# Patient Record
Sex: FEMALE | Race: WHITE | NOT HISPANIC OR LATINO | Employment: FULL TIME | URBAN - METROPOLITAN AREA
[De-identification: names, ages, dates, MRNs, and addresses within clinical notes are randomized per-mention and may not be internally consistent; named-entity substitution may affect disease eponyms.]

---

## 2017-10-29 ENCOUNTER — COMMUNICATION - HEALTHEAST (OUTPATIENT)
Dept: SCHEDULING | Facility: CLINIC | Age: 27
End: 2017-10-29

## 2018-04-06 ENCOUNTER — OFFICE VISIT - HEALTHEAST (OUTPATIENT)
Dept: FAMILY MEDICINE | Facility: CLINIC | Age: 28
End: 2018-04-06

## 2018-04-06 DIAGNOSIS — Z00.00 ROUTINE GENERAL MEDICAL EXAMINATION AT A HEALTH CARE FACILITY: ICD-10-CM

## 2018-04-06 DIAGNOSIS — R00.2 PALPITATIONS: ICD-10-CM

## 2018-04-06 DIAGNOSIS — N64.4 BREAST PAIN, RIGHT: ICD-10-CM

## 2018-04-06 DIAGNOSIS — N63.0 BREAST LUMP: ICD-10-CM

## 2018-04-06 DIAGNOSIS — E66.9 OBESITY (BMI 35.0-39.9 WITHOUT COMORBIDITY): ICD-10-CM

## 2018-04-06 LAB
CHOLEST SERPL-MCNC: 180 MG/DL
ERYTHROCYTE [DISTWIDTH] IN BLOOD BY AUTOMATED COUNT: 11.4 % (ref 11–14.5)
FASTING STATUS PATIENT QL REPORTED: NO
FASTING STATUS PATIENT QL REPORTED: NORMAL
GLUCOSE BLD-MCNC: 97 MG/DL (ref 70–125)
HBA1C MFR BLD: 5.4 % (ref 3.5–6)
HCT VFR BLD AUTO: 39.6 % (ref 35–47)
HDLC SERPL-MCNC: 53 MG/DL
HGB BLD-MCNC: 13.5 G/DL (ref 12–16)
LDLC SERPL CALC-MCNC: 102 MG/DL
MCH RBC QN AUTO: 30.4 PG (ref 27–34)
MCHC RBC AUTO-ENTMCNC: 34.2 G/DL (ref 32–36)
MCV RBC AUTO: 89 FL (ref 80–100)
PLATELET # BLD AUTO: 288 THOU/UL (ref 140–440)
PMV BLD AUTO: 8.1 FL (ref 7–10)
RBC # BLD AUTO: 4.45 MILL/UL (ref 3.8–5.4)
TRIGL SERPL-MCNC: 126 MG/DL
TSH SERPL DL<=0.005 MIU/L-ACNC: 1.95 UIU/ML (ref 0.3–5)
WBC: 11 THOU/UL (ref 4–11)

## 2018-04-06 ASSESSMENT — MIFFLIN-ST. JEOR: SCORE: 1747.22

## 2018-04-09 ENCOUNTER — COMMUNICATION - HEALTHEAST (OUTPATIENT)
Dept: FAMILY MEDICINE | Facility: CLINIC | Age: 28
End: 2018-04-09

## 2018-04-17 ENCOUNTER — HOSPITAL ENCOUNTER (OUTPATIENT)
Dept: ULTRASOUND IMAGING | Facility: CLINIC | Age: 28
Discharge: HOME | End: 2018-04-17
Attending: FAMILY MEDICINE

## 2018-04-17 DIAGNOSIS — N64.4 BREAST PAIN, RIGHT: ICD-10-CM

## 2018-04-17 DIAGNOSIS — N63.0 BREAST LUMP: ICD-10-CM

## 2018-04-18 ENCOUNTER — COMMUNICATION - HEALTHEAST (OUTPATIENT)
Dept: SCHEDULING | Facility: CLINIC | Age: 28
End: 2018-04-18

## 2018-04-19 ENCOUNTER — COMMUNICATION - HEALTHEAST (OUTPATIENT)
Dept: FAMILY MEDICINE | Facility: CLINIC | Age: 28
End: 2018-04-19

## 2018-05-31 ENCOUNTER — COMMUNICATION - HEALTHEAST (OUTPATIENT)
Dept: SCHEDULING | Facility: CLINIC | Age: 28
End: 2018-05-31

## 2018-08-07 ENCOUNTER — RECORDS - HEALTHEAST (OUTPATIENT)
Dept: ADMINISTRATIVE | Facility: OTHER | Age: 28
End: 2018-08-07

## 2019-03-15 ENCOUNTER — COMMUNICATION - HEALTHEAST (OUTPATIENT)
Dept: FAMILY MEDICINE | Facility: CLINIC | Age: 29
End: 2019-03-15

## 2019-03-15 DIAGNOSIS — K21.9 GERD (GASTROESOPHAGEAL REFLUX DISEASE): ICD-10-CM

## 2019-04-04 ENCOUNTER — COMMUNICATION - HEALTHEAST (OUTPATIENT)
Dept: SCHEDULING | Facility: CLINIC | Age: 29
End: 2019-04-04

## 2019-04-04 DIAGNOSIS — K21.9 GASTROESOPHAGEAL REFLUX DISEASE WITHOUT ESOPHAGITIS: ICD-10-CM

## 2019-07-11 ENCOUNTER — RECORDS - HEALTHEAST (OUTPATIENT)
Dept: GENERAL RADIOLOGY | Facility: CLINIC | Age: 29
End: 2019-07-11

## 2019-07-11 ENCOUNTER — OFFICE VISIT - HEALTHEAST (OUTPATIENT)
Dept: FAMILY MEDICINE | Facility: CLINIC | Age: 29
End: 2019-07-11

## 2019-07-11 DIAGNOSIS — F32.0 MILD MAJOR DEPRESSION (H): ICD-10-CM

## 2019-07-11 DIAGNOSIS — R10.12 LEFT UPPER QUADRANT PAIN: ICD-10-CM

## 2019-07-11 DIAGNOSIS — E66.01 MORBID OBESITY (H): ICD-10-CM

## 2019-07-11 ASSESSMENT — MIFFLIN-ST. JEOR: SCORE: 1774.15

## 2019-07-12 ENCOUNTER — COMMUNICATION - HEALTHEAST (OUTPATIENT)
Dept: FAMILY MEDICINE | Facility: CLINIC | Age: 29
End: 2019-07-12

## 2019-07-14 ENCOUNTER — COMMUNICATION - HEALTHEAST (OUTPATIENT)
Dept: FAMILY MEDICINE | Facility: CLINIC | Age: 29
End: 2019-07-14

## 2019-09-01 ENCOUNTER — COMMUNICATION - HEALTHEAST (OUTPATIENT)
Dept: SCHEDULING | Facility: CLINIC | Age: 29
End: 2019-09-01

## 2019-09-27 ENCOUNTER — COMMUNICATION - HEALTHEAST (OUTPATIENT)
Dept: FAMILY MEDICINE | Facility: CLINIC | Age: 29
End: 2019-09-27

## 2019-10-30 ENCOUNTER — OFFICE VISIT - HEALTHEAST (OUTPATIENT)
Dept: FAMILY MEDICINE | Facility: CLINIC | Age: 29
End: 2019-10-30

## 2019-10-30 ENCOUNTER — COMMUNICATION - HEALTHEAST (OUTPATIENT)
Dept: TELEHEALTH | Facility: CLINIC | Age: 29
End: 2019-10-30

## 2019-10-30 DIAGNOSIS — Z23 NEED FOR INFLUENZA VACCINATION: ICD-10-CM

## 2019-10-30 DIAGNOSIS — F32.0 MILD MAJOR DEPRESSION (H): ICD-10-CM

## 2019-10-30 DIAGNOSIS — F41.9 ANXIETY: ICD-10-CM

## 2019-10-30 ASSESSMENT — ANXIETY QUESTIONNAIRES
5. BEING SO RESTLESS THAT IT IS HARD TO SIT STILL: SEVERAL DAYS
2. NOT BEING ABLE TO STOP OR CONTROL WORRYING: NOT AT ALL
GAD7 TOTAL SCORE: 5
IF YOU CHECKED OFF ANY PROBLEMS ON THIS QUESTIONNAIRE, HOW DIFFICULT HAVE THESE PROBLEMS MADE IT FOR YOU TO DO YOUR WORK, TAKE CARE OF THINGS AT HOME, OR GET ALONG WITH OTHER PEOPLE: SOMEWHAT DIFFICULT
3. WORRYING TOO MUCH ABOUT DIFFERENT THINGS: NOT AT ALL
6. BECOMING EASILY ANNOYED OR IRRITABLE: MORE THAN HALF THE DAYS
7. FEELING AFRAID AS IF SOMETHING AWFUL MIGHT HAPPEN: SEVERAL DAYS
4. TROUBLE RELAXING: SEVERAL DAYS
1. FEELING NERVOUS, ANXIOUS, OR ON EDGE: NOT AT ALL

## 2019-10-30 ASSESSMENT — PATIENT HEALTH QUESTIONNAIRE - PHQ9: SUM OF ALL RESPONSES TO PHQ QUESTIONS 1-9: 10

## 2019-11-13 ENCOUNTER — OFFICE VISIT - HEALTHEAST (OUTPATIENT)
Dept: FAMILY MEDICINE | Facility: CLINIC | Age: 29
End: 2019-11-13

## 2019-11-13 DIAGNOSIS — E66.01 CLASS 3 SEVERE OBESITY DUE TO EXCESS CALORIES WITH SERIOUS COMORBIDITY AND BODY MASS INDEX (BMI) OF 40.0 TO 44.9 IN ADULT (H): ICD-10-CM

## 2019-11-13 DIAGNOSIS — L30.9 ECZEMA, UNSPECIFIED TYPE: ICD-10-CM

## 2019-11-13 DIAGNOSIS — Z11.4 SCREENING FOR HUMAN IMMUNODEFICIENCY VIRUS: ICD-10-CM

## 2019-11-13 DIAGNOSIS — E66.813 CLASS 3 SEVERE OBESITY DUE TO EXCESS CALORIES WITH SERIOUS COMORBIDITY AND BODY MASS INDEX (BMI) OF 40.0 TO 44.9 IN ADULT (H): ICD-10-CM

## 2019-11-13 DIAGNOSIS — Z00.00 PHYSICAL EXAM: ICD-10-CM

## 2019-11-13 DIAGNOSIS — L82.1 SEBORRHEIC KERATOSIS: ICD-10-CM

## 2019-11-13 DIAGNOSIS — L21.9 SEBORRHEIC DERMATITIS OF SCALP: ICD-10-CM

## 2019-11-13 DIAGNOSIS — R06.2 WHEEZING: ICD-10-CM

## 2019-11-13 DIAGNOSIS — F32.0 MILD MAJOR DEPRESSION (H): ICD-10-CM

## 2019-11-13 LAB
CHOLEST SERPL-MCNC: 194 MG/DL
FASTING STATUS PATIENT QL REPORTED: NO
FASTING STATUS PATIENT QL REPORTED: YES
GLUCOSE BLD-MCNC: 75 MG/DL (ref 70–125)
HBA1C MFR BLD: 5.4 % (ref 3.5–6)
HDLC SERPL-MCNC: 53 MG/DL
HGB BLD-MCNC: 13.8 G/DL (ref 12–16)
HIV 1+2 AB+HIV1 P24 AG SERPL QL IA: NEGATIVE
LDLC SERPL CALC-MCNC: 120 MG/DL
TRIGL SERPL-MCNC: 106 MG/DL

## 2019-11-13 ASSESSMENT — MIFFLIN-ST. JEOR: SCORE: 1761.39

## 2019-11-14 ENCOUNTER — COMMUNICATION - HEALTHEAST (OUTPATIENT)
Dept: FAMILY MEDICINE | Facility: CLINIC | Age: 29
End: 2019-11-14

## 2019-11-15 ENCOUNTER — COMMUNICATION - HEALTHEAST (OUTPATIENT)
Dept: FAMILY MEDICINE | Facility: CLINIC | Age: 29
End: 2019-11-15

## 2019-11-19 ASSESSMENT — PATIENT HEALTH QUESTIONNAIRE - PHQ9: SUM OF ALL RESPONSES TO PHQ QUESTIONS 1-9: 8

## 2019-11-19 ASSESSMENT — ANXIETY QUESTIONNAIRES
GAD7 TOTAL SCORE: 4
7. FEELING AFRAID AS IF SOMETHING AWFUL MIGHT HAPPEN: SEVERAL DAYS
5. BEING SO RESTLESS THAT IT IS HARD TO SIT STILL: NOT AT ALL
2. NOT BEING ABLE TO STOP OR CONTROL WORRYING: NOT AT ALL
6. BECOMING EASILY ANNOYED OR IRRITABLE: SEVERAL DAYS
4. TROUBLE RELAXING: SEVERAL DAYS
1. FEELING NERVOUS, ANXIOUS, OR ON EDGE: SEVERAL DAYS
3. WORRYING TOO MUCH ABOUT DIFFERENT THINGS: NOT AT ALL

## 2019-11-27 ENCOUNTER — COMMUNICATION - HEALTHEAST (OUTPATIENT)
Dept: FAMILY MEDICINE | Facility: CLINIC | Age: 29
End: 2019-11-27

## 2020-01-26 ENCOUNTER — COMMUNICATION - HEALTHEAST (OUTPATIENT)
Dept: SCHEDULING | Facility: CLINIC | Age: 30
End: 2020-01-26

## 2020-06-30 ENCOUNTER — COMMUNICATION - HEALTHEAST (OUTPATIENT)
Dept: SCHEDULING | Facility: CLINIC | Age: 30
End: 2020-06-30

## 2020-07-01 ENCOUNTER — OFFICE VISIT - HEALTHEAST (OUTPATIENT)
Dept: FAMILY MEDICINE | Facility: CLINIC | Age: 30
End: 2020-07-01

## 2020-07-01 DIAGNOSIS — F07.81 POST CONCUSSION SYNDROME: ICD-10-CM

## 2020-07-01 DIAGNOSIS — S13.4XXA WHIPLASH INJURY TO NECK, INITIAL ENCOUNTER: ICD-10-CM

## 2020-07-05 ENCOUNTER — COMMUNICATION - HEALTHEAST (OUTPATIENT)
Dept: FAMILY MEDICINE | Facility: CLINIC | Age: 30
End: 2020-07-05

## 2020-07-06 ENCOUNTER — OFFICE VISIT - HEALTHEAST (OUTPATIENT)
Dept: FAMILY MEDICINE | Facility: CLINIC | Age: 30
End: 2020-07-06

## 2020-07-06 ENCOUNTER — COMMUNICATION - HEALTHEAST (OUTPATIENT)
Dept: SCHEDULING | Facility: CLINIC | Age: 30
End: 2020-07-06

## 2020-07-06 DIAGNOSIS — S06.0X0D CONCUSSION WITHOUT LOSS OF CONSCIOUSNESS, SUBSEQUENT ENCOUNTER: ICD-10-CM

## 2020-07-08 ENCOUNTER — COMMUNICATION - HEALTHEAST (OUTPATIENT)
Dept: FAMILY MEDICINE | Facility: CLINIC | Age: 30
End: 2020-07-08

## 2020-07-09 ENCOUNTER — COMMUNICATION - HEALTHEAST (OUTPATIENT)
Dept: FAMILY MEDICINE | Facility: CLINIC | Age: 30
End: 2020-07-09

## 2020-07-10 ENCOUNTER — OFFICE VISIT - HEALTHEAST (OUTPATIENT)
Dept: FAMILY MEDICINE | Facility: CLINIC | Age: 30
End: 2020-07-10

## 2020-07-10 DIAGNOSIS — F07.81 POST CONCUSSION SYNDROME: ICD-10-CM

## 2020-07-22 ENCOUNTER — HOSPITAL ENCOUNTER (OUTPATIENT)
Dept: NEUROLOGY | Facility: CLINIC | Age: 30
Setting detail: THERAPIES SERIES
Discharge: STILL ADMITTED OR ADMINISTRATIVE | End: 2020-07-22
Attending: NURSE PRACTITIONER

## 2020-07-22 DIAGNOSIS — G44.309 POST-CONCUSSION HEADACHE: ICD-10-CM

## 2020-07-22 DIAGNOSIS — H05.313: ICD-10-CM

## 2020-07-22 DIAGNOSIS — F07.81 POST CONCUSSION SYNDROME: ICD-10-CM

## 2020-07-22 DIAGNOSIS — S06.0X0D CONCUSSION WITHOUT LOSS OF CONSCIOUSNESS, SUBSEQUENT ENCOUNTER: ICD-10-CM

## 2020-07-22 DIAGNOSIS — H83.3X3 SOUND SENSITIVITY IN BOTH EARS: ICD-10-CM

## 2020-07-22 DIAGNOSIS — F32.0 MILD MAJOR DEPRESSION (H): ICD-10-CM

## 2020-07-22 DIAGNOSIS — R68.89 SENSITIVITY TO LIGHT: ICD-10-CM

## 2020-07-22 DIAGNOSIS — F06.4 ANXIETY DISORDER DUE TO MEDICAL CONDITION: ICD-10-CM

## 2020-07-22 DIAGNOSIS — R41.840 ATTENTION AND CONCENTRATION DEFICIT: ICD-10-CM

## 2020-07-22 DIAGNOSIS — Z76.89 RETURN TO WORK EVALUATION: ICD-10-CM

## 2020-07-22 DIAGNOSIS — R41.3 MEMORY DIFFICULTIES: ICD-10-CM

## 2020-07-22 DIAGNOSIS — R45.4 IRRITABILITY: ICD-10-CM

## 2020-07-22 DIAGNOSIS — G47.00 INSOMNIA, UNSPECIFIED TYPE: ICD-10-CM

## 2020-07-22 DIAGNOSIS — R53.83 FATIGUE, UNSPECIFIED TYPE: ICD-10-CM

## 2020-07-23 ENCOUNTER — COMMUNICATION - HEALTHEAST (OUTPATIENT)
Dept: NEUROLOGY | Facility: CLINIC | Age: 30
End: 2020-07-23

## 2020-07-28 ENCOUNTER — COMMUNICATION - HEALTHEAST (OUTPATIENT)
Dept: NEUROLOGY | Facility: CLINIC | Age: 30
End: 2020-07-28

## 2020-07-29 ENCOUNTER — COMMUNICATION - HEALTHEAST (OUTPATIENT)
Dept: NEUROLOGY | Facility: CLINIC | Age: 30
End: 2020-07-29

## 2020-07-31 ENCOUNTER — COMMUNICATION - HEALTHEAST (OUTPATIENT)
Dept: NEUROLOGY | Facility: CLINIC | Age: 30
End: 2020-07-31

## 2020-07-31 ENCOUNTER — COMMUNICATION - HEALTHEAST (OUTPATIENT)
Dept: FAMILY MEDICINE | Facility: CLINIC | Age: 30
End: 2020-07-31

## 2020-08-07 ENCOUNTER — OFFICE VISIT - HEALTHEAST (OUTPATIENT)
Dept: PHYSICAL THERAPY | Facility: REHABILITATION | Age: 30
End: 2020-08-07

## 2020-08-07 ENCOUNTER — COMMUNICATION - HEALTHEAST (OUTPATIENT)
Dept: NEUROLOGY | Facility: CLINIC | Age: 30
End: 2020-08-07

## 2020-08-07 DIAGNOSIS — G44.309 POST-CONCUSSION HEADACHE: ICD-10-CM

## 2020-08-07 DIAGNOSIS — F07.81 POST CONCUSSION SYNDROME: ICD-10-CM

## 2020-08-11 ENCOUNTER — OFFICE VISIT - HEALTHEAST (OUTPATIENT)
Dept: PHYSICAL THERAPY | Facility: REHABILITATION | Age: 30
End: 2020-08-11

## 2020-08-11 DIAGNOSIS — F07.81 POST CONCUSSION SYNDROME: ICD-10-CM

## 2020-08-11 DIAGNOSIS — G44.309 POST-CONCUSSION HEADACHE: ICD-10-CM

## 2020-08-13 ENCOUNTER — OFFICE VISIT - HEALTHEAST (OUTPATIENT)
Dept: PHYSICAL THERAPY | Facility: REHABILITATION | Age: 30
End: 2020-08-13

## 2020-08-13 DIAGNOSIS — F07.81 POST CONCUSSION SYNDROME: ICD-10-CM

## 2020-08-13 DIAGNOSIS — G44.309 POST-CONCUSSION HEADACHE: ICD-10-CM

## 2020-08-20 ENCOUNTER — OFFICE VISIT - HEALTHEAST (OUTPATIENT)
Dept: PHYSICAL THERAPY | Facility: REHABILITATION | Age: 30
End: 2020-08-20

## 2020-08-20 DIAGNOSIS — F07.81 POST CONCUSSION SYNDROME: ICD-10-CM

## 2020-08-20 DIAGNOSIS — G44.309 POST-CONCUSSION HEADACHE: ICD-10-CM

## 2020-08-25 ENCOUNTER — OFFICE VISIT - HEALTHEAST (OUTPATIENT)
Dept: PHYSICAL THERAPY | Facility: REHABILITATION | Age: 30
End: 2020-08-25

## 2020-08-25 DIAGNOSIS — F07.81 POST CONCUSSION SYNDROME: ICD-10-CM

## 2020-08-25 DIAGNOSIS — G44.309 POST-CONCUSSION HEADACHE: ICD-10-CM

## 2020-08-27 ENCOUNTER — OFFICE VISIT - HEALTHEAST (OUTPATIENT)
Dept: PHYSICAL THERAPY | Facility: REHABILITATION | Age: 30
End: 2020-08-27

## 2020-08-27 DIAGNOSIS — G44.309 POST-CONCUSSION HEADACHE: ICD-10-CM

## 2020-08-27 DIAGNOSIS — F07.81 POST CONCUSSION SYNDROME: ICD-10-CM

## 2020-09-01 ENCOUNTER — OFFICE VISIT - HEALTHEAST (OUTPATIENT)
Dept: PHYSICAL THERAPY | Facility: REHABILITATION | Age: 30
End: 2020-09-01

## 2020-09-01 DIAGNOSIS — F07.81 POST CONCUSSION SYNDROME: ICD-10-CM

## 2020-09-01 DIAGNOSIS — G44.309 POST-CONCUSSION HEADACHE: ICD-10-CM

## 2020-09-04 ENCOUNTER — OFFICE VISIT - HEALTHEAST (OUTPATIENT)
Dept: PHYSICAL THERAPY | Facility: REHABILITATION | Age: 30
End: 2020-09-04

## 2020-09-04 ENCOUNTER — HOSPITAL ENCOUNTER (OUTPATIENT)
Dept: NEUROLOGY | Facility: CLINIC | Age: 30
Setting detail: THERAPIES SERIES
Discharge: STILL ADMITTED OR ADMINISTRATIVE | End: 2020-09-04
Attending: NURSE PRACTITIONER

## 2020-09-04 DIAGNOSIS — G44.309 POST-CONCUSSION HEADACHE: ICD-10-CM

## 2020-09-04 DIAGNOSIS — R45.4 IRRITABILITY: ICD-10-CM

## 2020-09-04 DIAGNOSIS — F06.4 ANXIETY DISORDER DUE TO MEDICAL CONDITION: ICD-10-CM

## 2020-09-04 DIAGNOSIS — F07.81 POST CONCUSSION SYNDROME: ICD-10-CM

## 2020-09-04 DIAGNOSIS — R41.3 MEMORY DIFFICULTIES: ICD-10-CM

## 2020-09-04 DIAGNOSIS — R53.83 FATIGUE, UNSPECIFIED TYPE: ICD-10-CM

## 2020-09-04 DIAGNOSIS — G47.00 INSOMNIA, UNSPECIFIED TYPE: ICD-10-CM

## 2020-09-04 DIAGNOSIS — R42 DIZZINESS: ICD-10-CM

## 2020-09-04 DIAGNOSIS — H83.3X3 SOUND SENSITIVITY IN BOTH EARS: ICD-10-CM

## 2020-09-04 DIAGNOSIS — Z76.89 RETURN TO WORK EVALUATION: ICD-10-CM

## 2020-09-04 DIAGNOSIS — R68.89 SENSITIVITY TO LIGHT: ICD-10-CM

## 2020-09-04 DIAGNOSIS — R41.840 ATTENTION AND CONCENTRATION DEFICIT: ICD-10-CM

## 2020-09-04 DIAGNOSIS — R11.0 NAUSEA: ICD-10-CM

## 2020-09-04 DIAGNOSIS — F32.0 MILD MAJOR DEPRESSION (H): ICD-10-CM

## 2020-09-10 ENCOUNTER — OFFICE VISIT - HEALTHEAST (OUTPATIENT)
Dept: PHYSICAL THERAPY | Facility: REHABILITATION | Age: 30
End: 2020-09-10

## 2020-09-10 DIAGNOSIS — F07.81 POST CONCUSSION SYNDROME: ICD-10-CM

## 2020-09-10 DIAGNOSIS — G44.309 POST-CONCUSSION HEADACHE: ICD-10-CM

## 2020-09-14 ENCOUNTER — OFFICE VISIT - HEALTHEAST (OUTPATIENT)
Dept: PHYSICAL THERAPY | Facility: REHABILITATION | Age: 30
End: 2020-09-14

## 2020-09-14 DIAGNOSIS — F07.81 POST CONCUSSION SYNDROME: ICD-10-CM

## 2020-09-14 DIAGNOSIS — G44.309 POST-CONCUSSION HEADACHE: ICD-10-CM

## 2020-09-17 ENCOUNTER — OFFICE VISIT - HEALTHEAST (OUTPATIENT)
Dept: PHYSICAL THERAPY | Facility: REHABILITATION | Age: 30
End: 2020-09-17

## 2020-09-17 DIAGNOSIS — G44.309 POST-CONCUSSION HEADACHE: ICD-10-CM

## 2020-09-17 DIAGNOSIS — F07.81 POST CONCUSSION SYNDROME: ICD-10-CM

## 2020-09-21 ENCOUNTER — OFFICE VISIT - HEALTHEAST (OUTPATIENT)
Dept: PHYSICAL THERAPY | Facility: REHABILITATION | Age: 30
End: 2020-09-21

## 2020-09-21 DIAGNOSIS — G44.309 POST-CONCUSSION HEADACHE: ICD-10-CM

## 2020-09-21 DIAGNOSIS — F07.81 POST CONCUSSION SYNDROME: ICD-10-CM

## 2020-10-06 ENCOUNTER — OFFICE VISIT - HEALTHEAST (OUTPATIENT)
Dept: PHYSICAL THERAPY | Facility: REHABILITATION | Age: 30
End: 2020-10-06

## 2020-10-06 DIAGNOSIS — F07.81 POST CONCUSSION SYNDROME: ICD-10-CM

## 2020-10-06 DIAGNOSIS — G44.309 POST-CONCUSSION HEADACHE: ICD-10-CM

## 2020-10-12 ENCOUNTER — OFFICE VISIT - HEALTHEAST (OUTPATIENT)
Dept: PHYSICAL THERAPY | Facility: REHABILITATION | Age: 30
End: 2020-10-12

## 2020-10-12 DIAGNOSIS — F07.81 POST CONCUSSION SYNDROME: ICD-10-CM

## 2020-10-12 DIAGNOSIS — G44.309 POST-CONCUSSION HEADACHE: ICD-10-CM

## 2020-10-13 ENCOUNTER — HOSPITAL ENCOUNTER (OUTPATIENT)
Dept: NEUROLOGY | Facility: CLINIC | Age: 30
Setting detail: THERAPIES SERIES
Discharge: STILL ADMITTED OR ADMINISTRATIVE | End: 2020-10-13
Attending: NURSE PRACTITIONER

## 2020-10-13 DIAGNOSIS — R68.89 SENSITIVITY TO LIGHT: ICD-10-CM

## 2020-10-13 DIAGNOSIS — G44.309 POST-CONCUSSION HEADACHE: ICD-10-CM

## 2020-10-13 DIAGNOSIS — H83.3X3 SOUND SENSITIVITY IN BOTH EARS: ICD-10-CM

## 2020-10-13 DIAGNOSIS — F32.0 MILD MAJOR DEPRESSION (H): ICD-10-CM

## 2020-10-13 DIAGNOSIS — R41.840 ATTENTION AND CONCENTRATION DEFICIT: ICD-10-CM

## 2020-10-13 DIAGNOSIS — F07.81 POSTCONCUSSION SYNDROME: ICD-10-CM

## 2020-10-13 DIAGNOSIS — R41.3 MEMORY DIFFICULTIES: ICD-10-CM

## 2020-10-13 DIAGNOSIS — Z76.89 RETURN TO WORK EVALUATION: ICD-10-CM

## 2020-10-13 DIAGNOSIS — F06.4 ANXIETY DISORDER DUE TO MEDICAL CONDITION: ICD-10-CM

## 2020-10-13 ASSESSMENT — MIFFLIN-ST. JEOR: SCORE: 1780.95

## 2020-10-19 ENCOUNTER — COMMUNICATION - HEALTHEAST (OUTPATIENT)
Dept: PHYSICAL THERAPY | Facility: REHABILITATION | Age: 30
End: 2020-10-19

## 2020-11-16 ENCOUNTER — AMBULATORY - HEALTHEAST (OUTPATIENT)
Dept: FAMILY MEDICINE | Facility: CLINIC | Age: 30
End: 2020-11-16

## 2020-11-16 ENCOUNTER — VIRTUAL VISIT (OUTPATIENT)
Dept: FAMILY MEDICINE | Facility: OTHER | Age: 30
End: 2020-11-16
Payer: COMMERCIAL

## 2020-11-16 DIAGNOSIS — Z20.822 SUSPECTED COVID-19 VIRUS INFECTION: ICD-10-CM

## 2020-11-16 PROCEDURE — 99421 OL DIG E/M SVC 5-10 MIN: CPT | Performed by: PHYSICIAN ASSISTANT

## 2020-11-16 NOTE — PROGRESS NOTES
"Date: 2020 09:50:54  Clinician: Davian Zavaleta  Clinician NPI: [id number]  Patient: Radha Dickerson  Patient : 1990  Patient Address: [address] [place] [address]  Patient Phone: [phone]  Visit Protocol: URI  Patient Summary:  Radha is a 30 year old ( : 1990 ) female who initiated a [facility] Visit for COVID-19 (Coronavirus) evaluation and screening. When asked the question \"Please sign me up to receive news, health information and promotions from [facility].\", Radha responded \"Yes\".    Radha states her symptoms started 1-2 days ago.   Her symptoms consist of facial pain or pressure, malaise, a sore throat, tooth pain, and a cough.   Symptom details     Cough: Radha coughs a few times an hour and her cough is not more bothersome at night. Phlegm does not come into her throat when she coughs. She does not believe her cough is caused by post-nasal drip.     Sore throat: Radha reports having mild throat pain (1-3 on a 10 point pain scale), does not have exudate on her tonsils, and can swallow liquids. She is not sure if the lymph nodes in her neck are enlarged. A rash has not appeared on the skin since the sore throat started.     Facial pain or pressure: The facial pain or pressure does not feel worse when bending or leaning forward.     Tooth pain: The tooth pain is not caused by a cavity, recent dental work, or other mouth problems.      Radha denies having vomiting, rhinitis, myalgias, chills, ageusia, diarrhea, ear pain, headache, wheezing, fever, nasal congestion, nausea, and anosmia. She also denies taking antibiotic medication in the past month and having recent facial or sinus surgery in the past 60 days. She is not experiencing dyspnea.   Precipitating events  Within the past week, Radha has not been exposed to someone with strep throat. She has not recently been exposed to someone with influenza. Radha has been in close contact with the following high risk individuals: " immunocompromised people and children under the age of 5.   Pertinent COVID-19 (Coronavirus) information  Radha does not work or volunteer as healthcare worker or a . In the past 14 days, Radha has worked or volunteered at a healthcare facility or a group living setting. 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   In the past 14 days, she has not lived in a congregate living setting.   Radha has had a close contact with a laboratory-confirmed COVID-19 patient within 14 days of symptom onset. She was not exposed at her work. Date Radha was exposed to the laboratory-confirmed COVID-19 patient: 11/05/2020   Additional information about contact with COVID-19 (Coronavirus) patient as reported by the patient (free text): Exposed to a 16 year old in [place] who became symptomatic on 11/6/2020    Since December 2019, Radha has not been tested for COVID-19 and has not had upper respiratory infection or influenza-like illness.   Pertinent medical history  Radha does not get yeast infections when she takes antibiotics.   Radha needs a return to work/school note.   Weight: 235 lbs   Radha does not smoke or use smokeless tobacco.   She denies pregnancy and denies breastfeeding. She has menstruated in the past month.   Weight: 235 lbs    MEDICATIONS: escitalopram oxalate oral, ALLERGIES: morphine  Clinician Response:  Dear Radha,   Your symptoms show that you may have coronavirus (COVID-19). This illness can cause fever, cough and trouble breathing. Many people get a mild case and get better on their own. Some people can get very sick.  What should I do?  We would like to test you for this virus.   1. Please call [phone] to schedule your visit. Explain that you were referred by [facility] to have a COVID-19 test. Be ready to share your [facility] visit ID number.  * If you need to schedule in [facility] please call  "[phone] or for [facility] please call [phone].  * If you need to schedule in the [place] area please call [phone]. [facility] employees call [phone].  The following will serve as your written order for this COVID Test, ordered by me, for the indication of suspected COVID [Z20.828]: The test will be ordered in [facility], our electronic health record, after you are scheduled. It will show as ordered and authorized by Terry Moreno MD.  Order: COVID-19 (Coronavirus) PCR for SYMPTOMATIC testing from [facility].   2. When it's time for your COVID test:  Stay at least 6 feet away from others. (If someone will drive you to your test, stay in the backseat, as far away from the  as you can.)   Cover your mouth and nose with a mask, tissue or washcloth.  Go straight to the testing site. Don't make any stops on the way there or back.      3.Starting now: Stay home and away from others (self-isolate) until:   You've had no fever---and no medicine that reduces fever---for one full day (24 hours). And...   Your other symptoms have gotten better. For example, your cough or breathing has improved. And...   At least 10 days have passed since your symptoms started.       During this time, don't leave the house except for testing or medical care.   Stay in your own room, even for meals. Use your own bathroom if you can.   Stay away from others in your home. No hugging, kissing or shaking hands. No visitors.  Don't go to work, school or anywhere else.    Clean \"high touch\" surfaces often (doorknobs, counters, handles, etc.). Use a household cleaning spray or wipes. You'll find a full list of  on the EPA website: www.epa.gov/pesticide-registration/list-n-disinfectants-use-against-sars-cov-2.   Cover your mouth and nose with a mask, tissue or washcloth to avoid spreading germs.  Wash your hands and face often. Use soap and water.  Caregivers in these groups are at risk for severe illness due to COVID-19:  o " People 65 years and older  o People who live in a nursing home or long-term care facility  o People with chronic disease (lung, heart, cancer, diabetes, kidney, liver, immunologic)  o People who have a weakened immune system, including those who:   Are in cancer treatment  Take medicine that weakens the immune system, such as corticosteroids  Had a bone marrow or organ transplant  Have an immune deficiency  Have poorly controlled HIV or AIDS  Are obese (body mass index of 40 or higher)  Smoke regularly   o Caregivers should wear gloves while washing dishes, handling laundry and cleaning bedrooms and bathrooms.  o Use caution when washing and drying laundry: Don't shake dirty laundry, and use the warmest water setting that you can.  o For more tips, go to www.cdc.gov/coronavirus/2019-ncov/downloads/10Things.pdf.       How can I take care of myself?   Get lots of rest. Drink extra fluids (unless a doctor has told you not to).   Take Tylenol (acetaminophen) for fever or pain. If you have liver or kidney problems, ask your family doctor if it's okay to take Tylenol.   Adults can take either:    650 mg (two 325 mg pills) every 4 to 6 hours, or...   1,000 mg (two 500 mg pills) every 8 hours as needed.    Note: Don't take more than 3,000 mg in one day. Acetaminophen is found in many medicines (both prescribed and over-the-counter medicines). Read all labels to be sure you don't take too much.   For children, check the Tylenol bottle for the right dose. The dose is based on the child's age or weight.    If you have other health problems (like cancer, heart failure, an organ transplant or severe kidney disease): Call your specialty clinic if you don't feel better in the next 2 days.       Know when to call 911. Emergency warning signs include:    Trouble breathing or shortness of breath Pain or pressure in the chest that doesn't go away Feeling confused like you haven't felt before, or not being able to wake up  Bluish-colored lips or face.  Where can I get more information?   [facility] -- About COVID-19: [email or web address]   CDC -- What to Do If You're Sick: www.cdc.gov/coronavirus/2019-ncov/about/steps-when-sick.html   [facility] -- Ending Home Isolation: www.cdc.gov/coronavirus/2019-ncov/hcp/disposition-in-home-patients.html   [facility] -- Caring for Someone: www.cdc.gov/coronavirus/2019-ncov/if-you-are-sick/care-for-someone.html   [facility] -- Interim Guidance for Hospital Discharge to Home: www.[facility].mn.us/diseases/coronavirus/hcp/hospdischarge.pdf   [facility] clinical trials (COVID-19 research studies): clinicalaffairs.[facility]-clinical-trials    Below are the COVID-19 hotlines at the [place] Department of [facility] ([facility]). Interpreters are available.    For health questions: Call 244-752-5757 or 1-559.177.5109 (7 a.m. to 7 p.m.) For questions about schools and childcare: Call 639-988-8294 or 1-293.715.3792 (7 a.m. to 7 p.m.)    Diagnosis: Contact with and (suspected) exposure to other viral communicable diseases  Diagnosis ICD: Z20.828

## 2020-11-18 ENCOUNTER — COMMUNICATION - HEALTHEAST (OUTPATIENT)
Dept: SCHEDULING | Facility: CLINIC | Age: 30
End: 2020-11-18

## 2020-11-19 ENCOUNTER — HOSPITAL ENCOUNTER (OUTPATIENT)
Dept: NEUROLOGY | Facility: CLINIC | Age: 30
Setting detail: THERAPIES SERIES
Discharge: STILL ADMITTED OR ADMINISTRATIVE | End: 2020-11-19
Attending: NURSE PRACTITIONER

## 2020-11-19 DIAGNOSIS — G44.309 POST-CONCUSSION HEADACHE: ICD-10-CM

## 2020-11-19 DIAGNOSIS — R68.89 SENSITIVITY TO LIGHT: ICD-10-CM

## 2020-11-19 DIAGNOSIS — G47.00 INSOMNIA, UNSPECIFIED TYPE: ICD-10-CM

## 2020-11-19 DIAGNOSIS — H83.3X3 SOUND SENSITIVITY IN BOTH EARS: ICD-10-CM

## 2020-11-19 DIAGNOSIS — Z76.89 RETURN TO WORK EVALUATION: ICD-10-CM

## 2020-11-19 DIAGNOSIS — R42 DIZZINESS: ICD-10-CM

## 2020-11-19 DIAGNOSIS — F07.81 POSTCONCUSSION SYNDROME: ICD-10-CM

## 2020-11-19 DIAGNOSIS — F32.0 MILD MAJOR DEPRESSION (H): ICD-10-CM

## 2020-11-19 DIAGNOSIS — R11.0 NAUSEA: ICD-10-CM

## 2020-11-19 DIAGNOSIS — R53.83 FATIGUE, UNSPECIFIED TYPE: ICD-10-CM

## 2020-11-19 DIAGNOSIS — R41.840 ATTENTION AND CONCENTRATION DEFICIT: ICD-10-CM

## 2020-11-19 ASSESSMENT — MIFFLIN-ST. JEOR: SCORE: 1758.27

## 2020-12-02 ENCOUNTER — COMMUNICATION - HEALTHEAST (OUTPATIENT)
Dept: FAMILY MEDICINE | Facility: CLINIC | Age: 30
End: 2020-12-02

## 2020-12-02 DIAGNOSIS — L21.9 SEBORRHEIC DERMATITIS OF SCALP: ICD-10-CM

## 2020-12-02 DIAGNOSIS — F32.0 MILD MAJOR DEPRESSION (H): ICD-10-CM

## 2020-12-17 ENCOUNTER — COMMUNICATION - HEALTHEAST (OUTPATIENT)
Dept: FAMILY MEDICINE | Facility: CLINIC | Age: 30
End: 2020-12-17

## 2020-12-17 ENCOUNTER — HOSPITAL ENCOUNTER (OUTPATIENT)
Dept: NEUROLOGY | Facility: CLINIC | Age: 30
Setting detail: THERAPIES SERIES
Discharge: STILL ADMITTED OR ADMINISTRATIVE | End: 2020-12-17
Attending: NURSE PRACTITIONER

## 2020-12-17 DIAGNOSIS — F32.0 MILD MAJOR DEPRESSION (H): ICD-10-CM

## 2020-12-17 DIAGNOSIS — R42 DIZZINESS: ICD-10-CM

## 2020-12-17 DIAGNOSIS — Z76.89 RETURN TO WORK EVALUATION: ICD-10-CM

## 2020-12-17 DIAGNOSIS — R45.4 IRRITABILITY: ICD-10-CM

## 2020-12-17 DIAGNOSIS — F07.81 POSTCONCUSSION SYNDROME: ICD-10-CM

## 2020-12-17 DIAGNOSIS — R41.840 ATTENTION AND CONCENTRATION DEFICIT: ICD-10-CM

## 2020-12-17 DIAGNOSIS — R41.3 MEMORY DIFFICULTIES: ICD-10-CM

## 2020-12-17 DIAGNOSIS — R53.83 FATIGUE, UNSPECIFIED TYPE: ICD-10-CM

## 2020-12-17 DIAGNOSIS — F06.4 ANXIETY DISORDER DUE TO MEDICAL CONDITION: ICD-10-CM

## 2020-12-17 DIAGNOSIS — G47.00 INSOMNIA, UNSPECIFIED TYPE: ICD-10-CM

## 2020-12-17 DIAGNOSIS — H83.3X3 SOUND SENSITIVITY IN BOTH EARS: ICD-10-CM

## 2020-12-17 DIAGNOSIS — G44.309 POST-CONCUSSION HEADACHE: ICD-10-CM

## 2020-12-17 DIAGNOSIS — R68.89 SENSITIVITY TO LIGHT: ICD-10-CM

## 2020-12-17 DIAGNOSIS — R11.0 NAUSEA: ICD-10-CM

## 2021-02-26 ENCOUNTER — AMBULATORY - HEALTHEAST (OUTPATIENT)
Dept: NURSING | Facility: CLINIC | Age: 31
End: 2021-02-26

## 2021-03-17 ENCOUNTER — OFFICE VISIT - HEALTHEAST (OUTPATIENT)
Dept: FAMILY MEDICINE | Facility: CLINIC | Age: 31
End: 2021-03-17

## 2021-03-17 DIAGNOSIS — F32.0 MILD MAJOR DEPRESSION (H): ICD-10-CM

## 2021-03-17 ASSESSMENT — ANXIETY QUESTIONNAIRES
4. TROUBLE RELAXING: NEARLY EVERY DAY
6. BECOMING EASILY ANNOYED OR IRRITABLE: NEARLY EVERY DAY
GAD7 TOTAL SCORE: 9
7. FEELING AFRAID AS IF SOMETHING AWFUL MIGHT HAPPEN: NOT AT ALL
3. WORRYING TOO MUCH ABOUT DIFFERENT THINGS: NOT AT ALL
5. BEING SO RESTLESS THAT IT IS HARD TO SIT STILL: MORE THAN HALF THE DAYS
1. FEELING NERVOUS, ANXIOUS, OR ON EDGE: SEVERAL DAYS
IF YOU CHECKED OFF ANY PROBLEMS ON THIS QUESTIONNAIRE, HOW DIFFICULT HAVE THESE PROBLEMS MADE IT FOR YOU TO DO YOUR WORK, TAKE CARE OF THINGS AT HOME, OR GET ALONG WITH OTHER PEOPLE: NOT DIFFICULT AT ALL
2. NOT BEING ABLE TO STOP OR CONTROL WORRYING: NOT AT ALL

## 2021-03-17 ASSESSMENT — PATIENT HEALTH QUESTIONNAIRE - PHQ9: SUM OF ALL RESPONSES TO PHQ QUESTIONS 1-9: 10

## 2021-03-19 ENCOUNTER — AMBULATORY - HEALTHEAST (OUTPATIENT)
Dept: NURSING | Facility: CLINIC | Age: 31
End: 2021-03-19

## 2021-04-24 ENCOUNTER — COMMUNICATION - HEALTHEAST (OUTPATIENT)
Dept: FAMILY MEDICINE | Facility: CLINIC | Age: 31
End: 2021-04-24

## 2021-04-24 DIAGNOSIS — F32.0 MILD MAJOR DEPRESSION (H): ICD-10-CM

## 2021-04-24 DIAGNOSIS — L21.9 SEBORRHEIC DERMATITIS OF SCALP: ICD-10-CM

## 2021-05-17 ENCOUNTER — COMMUNICATION - HEALTHEAST (OUTPATIENT)
Dept: SCHEDULING | Facility: CLINIC | Age: 31
End: 2021-05-17

## 2021-05-23 ENCOUNTER — AMBULATORY - HEALTHEAST (OUTPATIENT)
Dept: NEUROLOGY | Facility: CLINIC | Age: 31
End: 2021-05-23

## 2021-05-23 ENCOUNTER — RECORDS - HEALTHEAST (OUTPATIENT)
Dept: FAMILY MEDICINE | Facility: CLINIC | Age: 31
End: 2021-05-23

## 2021-05-23 DIAGNOSIS — F32.0 MILD MAJOR DEPRESSION (H): ICD-10-CM

## 2021-05-23 DIAGNOSIS — R42 DIZZINESS: ICD-10-CM

## 2021-05-23 DIAGNOSIS — F07.81 POST CONCUSSION SYNDROME: ICD-10-CM

## 2021-05-26 ASSESSMENT — PATIENT HEALTH QUESTIONNAIRE - PHQ9
SUM OF ALL RESPONSES TO PHQ QUESTIONS 1-9: 8
SUM OF ALL RESPONSES TO PHQ QUESTIONS 1-9: 10

## 2021-05-27 ASSESSMENT — PATIENT HEALTH QUESTIONNAIRE - PHQ9: SUM OF ALL RESPONSES TO PHQ QUESTIONS 1-9: 10

## 2021-05-27 NOTE — TELEPHONE ENCOUNTER
Called and left a message re prescription. Advised to call back with prescription questions.     Leanne Guzman RN BA [facility] Triage/Med Refill 4/5/2019 10:10 AM

## 2021-05-27 NOTE — TELEPHONE ENCOUNTER
RN Triage:    New insurance does not cover pantoprazole 40 mg dose.  Cost is $360.00 without insurance.  Insurance does cover 20 mg pill.  Could you please resend prescription for 20 mg pills, and change the sig and the number of pills dispensed accordingly?  Otherwise, does Dr. Valenzuela have any other suggestions?  She is awaiting a callback on  4/5/19.    Ivory Houston RN   [facility]

## 2021-05-28 ASSESSMENT — ANXIETY QUESTIONNAIRES
GAD7 TOTAL SCORE: 9
GAD7 TOTAL SCORE: 5
GAD7 TOTAL SCORE: 4

## 2021-05-30 NOTE — TELEPHONE ENCOUNTER
----- Message from Cassy Valenzuela MD sent at 7/11/2019  4:43 PM CDT -----  Please let pt know that radiologist did not see any additional abnormalities on her abdominal xray

## 2021-05-30 NOTE — TELEPHONE ENCOUNTER
Left message to call back for: xray results  Information to relay to patient:  Ok to relay results below upon return call.

## 2021-05-30 NOTE — TELEPHONE ENCOUNTER
Patient Returning Call  Reason for call:  Test  results  Information relayed to patient:  Read to patient below message. Patient had no further questions.  Patient has additional questions:  No  If YES, what are yourquestions/concerns:  NA    Okay to leave a detailed message?: No call back needed

## 2021-05-30 NOTE — TELEPHONE ENCOUNTER
Pt has been having issues with constipation    Now since she has been taking the miralax her stools are loose.    Advised that she needs to increase her fiber intake as recommended by the provider.    If symptoms persist recommend that she be seen.    Neva Maurice RN  [facility] Medication Refill and Triage Nurse  7/14/2019  12:49 PM    Reason for Disposition    Treating constipation with Over-The-Counter (OTC) medicines, questions about    Protocols used: CONSTIPATION-A-AH

## 2021-05-30 NOTE — PROGRESS NOTES
Assessment/Plan:     1. Left upper quadrant pain  XR Abdomen 2 Views   2. Morbid obesity (H)     3. Mild major depression (H)         Diagnoses and all orders for this visit:    Left upper quadrant pain  -     XR Abdomen 2 Views; Future; Expected date: 07/11/2019  -Discussed with patient results of x-ray.  I feel that a lot of her symptoms are due to underlying constipation.  She may have some associated irritable bowel syndrome as well.  At this time, we will work to improve her bowel regimen.  Encourage adequate fiber and fluids.  Recommend MiraLAX daily to improve regularity of bowel movements.  Notify if this does not improve symptoms within the next week.  Do not feel any lab work is needed at this time.  No red flag symptoms on history or exam.  She is planning to schedule physical so we can follow-up on her symptoms at that time.  Also discussed keeping dietary log regarding foods and symptoms.  Discussed low FODMAPs diet and provided patient information.    Morbid obesity (H)  Encouraged regular exercise, healthy diet and weight loss efforts    Mild major depression (H)  She will continues to see therapist on a regular basis.           The following are part of a depression follow up plan for the patient:  under care of mental health team--sees a therapist regularly    Subjective:      Radha Dickerson is a 29 y.o. female who comes in today with concern about possible irritable bowel syndrome.  She reports that she has history of irregular bowel movements.  She has been bothered in particular by some pain in the left upper quadrant for the past couple of weeks.  This occurs after she eats.  She feels that there is something inflamed in this area.  Describes a squeezing discomfort.  Feels the need to go to the bathroom and have a bowel movement but then when she goes to the bathroom, nothing much comes out.  She has alternated constipation and diarrhea for the past couple of weeks.  She has not noticed  blood in her stools.  Sometimes she has had mucus in her stools.  She has has not experienced any urinary symptoms.  No fevers or chills.  No nausea or vomiting.  She does have history of acid reflux.  She generally is very careful about her diet.  She avoids use of alcohol.  She does not eat much dairy products.  She typically will eat meats, fruits and vegetables.  Does not eat a lot of wheat products.  She has been tested for celiac disease in the past.  There is family history of colon cancer in her grandmother on her mother side.  Has multiple relatives on father's side have had breast cancer.  She is not a smoker.  Medications and allergies reviewed and updated.  She has no other concerns or questions.  She does have history of depression.  She is not currently taking medication.  She does  see a therapist on a regular basis.    No current outpatient medications on file.     No current facility-administered medications for this visit.        Past Medical History, Family History, and Social History reviewed.  Past Medical History:   Diagnosis Date     Ectopic pregnancy      Past Surgical History:   Procedure Laterality Date     TONSILLECTOMY       Grapefruit and Morphine  Family History   Problem Relation Age of Onset     Cancer Mother         skin     Alcohol abuse Mother      Hyperlipidemia Father      Breast cancer Paternal Aunt      Breast cancer Paternal Grandmother      Colon cancer Maternal Grandmother      Stroke Maternal Grandmother      Breast cancer Paternal Aunt      Breast cancer Paternal Aunt      Breast cancer Paternal cousin      Breast cancer Paternal cousin      Social History     Socioeconomic History     Marital status: Single     Spouse name: Not on file     Number of children: Not on file     Years of education: Not on file     Highest education level: Not on file   Occupational History     Not on file   Social Needs     Financial resource strain: Not on file     Food insecurity:      "Worry: Not on file     Inability: Not on file     Transportation needs:     Medical: Not on file     Non-medical: Not on file   Tobacco Use     Smoking status: Never Smoker     Smokeless tobacco: Never Used   Substance and Sexual Activity     Alcohol use: No     Drug use: No     Sexual activity: Yes     Partners: Male     Birth control/protection: Condom   Lifestyle     Physical activity:     Days per week: Not on file     Minutes per session: Not on file     Stress: Not on file   Relationships     Social connections:     Talks on phone: Not on file     Gets together: Not on file     Attends Hoahaoism service: Not on file     Active member of club or organization: Not on file     Attends meetings of clubs or organizations: Not on file     Relationship status: Not on file     Intimate partner violence:     Fear of current or ex partner: Not on file     Emotionally abused: Not on file     Physically abused: Not on file     Forced sexual activity: Not on file   Other Topics Concern     Not on file   Social History Narrative     Not on file         Review of systems is as stated in HPI, and the remainder of the 10 system review is otherwise negative.    Objective:     Vitals:    07/11/19 1334   BP: 118/76   Patient Site: Right Arm   Patient Position: Sitting   Cuff Size: Adult Large   Pulse: 78   Temp: 98.6  F (37  C)   TempSrc: Oral   SpO2: 98%   Weight: (!) 233 lb 8 oz (105.9 kg)   Height: 5' 4\" (1.626 m)    Body mass index is 40.08 kg/m .    General appearance: alert, appears stated age and cooperative  Lungs: clear to auscultation bilaterally  Heart: regular rate and rhythm, S1, S2 normal, no murmur, click, rub or gallop  Abdomen: soft, non-tender; bowel sounds normal; no masses,  no organomegaly    Results: AMERICA-7: 10   PHQ-9: 7    Results: X-ray of abdomen was performed in clinic.  This was personally reviewed.  There was presence of large amount of stool within colon.    This note has been dictated using voice " recognition software. Any grammatical or context distortions are unintentional and inherent to the the software.

## 2021-05-31 NOTE — TELEPHONE ENCOUNTER
"DX with IBS when last seen by Dr Valenzuela 2 months ago.. She had been constipated. She feels constipated again She states the pain is in a very specific area--upper abdominal pain mainly on the left side. Only when standing straight up. When sitting or lying down she is OK. She states it feels like constipation. She had not had a BM for the last 2 days. She had 1 regular small BM today and little pieces after that.   She states that the pain takes away her breath. Not sharp. Dull. Currently pain=\"8\".  She feels she needs to bend over when she walks. She states that  Dr Valenzuela put her on Miralax. Patient uses the Miralax prn. Nauseated without vomiting. No fever noted. She is taking fiber, warm liquids and MIralax.     Reason for Disposition    [1] SEVERE pain (e.g., excruciating) AND [2] present > 1 hour    Protocols used: ABDOMINAL PAIN - UPPER-A-AH    Triaged to a disposition of Go to ED now. She is considering going to [facility], she is aware that if she needs admission, she would be transferred to ED.     Rosalia Victor RN [facility] Triage Nurse  "

## 2021-06-01 ENCOUNTER — OFFICE VISIT - HEALTHEAST (OUTPATIENT)
Dept: FAMILY MEDICINE | Facility: CLINIC | Age: 31
End: 2021-06-01

## 2021-06-01 VITALS — HEIGHT: 64 IN | WEIGHT: 227.56 LBS | BODY MASS INDEX: 38.85 KG/M2

## 2021-06-01 DIAGNOSIS — R06.02 SOB (SHORTNESS OF BREATH): ICD-10-CM

## 2021-06-02 ENCOUNTER — RECORDS - HEALTHEAST (OUTPATIENT)
Dept: FAMILY MEDICINE | Facility: CLINIC | Age: 31
End: 2021-06-02

## 2021-06-02 NOTE — PROGRESS NOTES
Assessment/Plan:     1. Mild major depression (H)  escitalopram oxalate (LEXAPRO) 10 MG tablet   2. Need for influenza vaccination  Influenza,Seasonal,Quad,INJ =/>6months   3. Anxiety         Diagnoses and all orders for this visit:    Mild major depression (H)  -     escitalopram oxalate (LEXAPRO) 10 MG tablet; Take 1/2 tablet by mouth daily for 1 week and then increase to 1 tablet by mouth daily  Dispense: 90 tablet; Refill: 3  -We discussed medications used for treatment of anxiety as well as depression.  She previously took Lexapro and found that beneficial but did have some side effects of headaches initially but these went away after a couple of weeks.  Discussed we could try alternative medication although there is potential risk for side effects with that as well.  Discussed that since Lexapro was helpful and the headaches went away that it would make sense to resume this medication.  Discussed will take about a month before it starts to take full effect.  She was prescribed prescription for 10 mg of Lexapro and we we will have her try taking a little bit lower dose of 5 mg daily for 1 week and then increase to a full tablet daily.  Discussed this may help reduce the risk of side effects with starting medication.  She will continue to follow with her therapist.  She does have an appointment for physical in November so we will follow-up with her at that time.    Need for influenza vaccination  -     Influenza,Seasonal,Quad,INJ =/>6months    Anxiety  See #1 above         The following are part of a depression follow up plan for the patient:  mental health care management    Subjective:      Radha Dickerson is a 29 y.o. female who comes in today to discuss treatment for depression.  She has history of post traumatic stress disorder that has led to feelings of anxiety and depression.  She has been seeing a therapist on a regular basis and still sees the therapist.  She feels that her anxiety is well  controlled but she still has some leftover depression that she needs to work through.  She has struggled to work through this with her therapist because when they discuss this, it will trigger an acute worsening of her depression and she will have to miss work for a couple of days afterward.  Her therapist has encouraged her to start taking an antidepressant medication so that they are able to progress with her cognitive behavioral therapy.  Patient has taken Lexapro in the past.  She took this last year.  Was prescribed 10 mg.  She took it for about 4 months and then had a change in insurance so she discontinued the medication.  She does not want to be on medication long-term.  States that she felt that the Lexapro was helpful but she did have some headaches for the first couple of weeks that she took the medication.  They eventually went away.  She is concerned about the headaches coming back if she starts the medication again.  We reviewed her current medications and allergies and updated the chart.  Review of systems is assessed and is otherwise negative.  No other concerns or questions.    Current Outpatient Medications   Medication Sig Dispense Refill     escitalopram oxalate (LEXAPRO) 10 MG tablet Take 1/2 tablet by mouth daily for 1 week and then increase to 1 tablet by mouth daily 90 tablet 3     No current facility-administered medications for this visit.        Past Medical History, Family History, and Social History reviewed.  Past Medical History:   Diagnosis Date     Ectopic pregnancy      Past Surgical History:   Procedure Laterality Date     TONSILLECTOMY       Grapefruit and Morphine  Family History   Problem Relation Age of Onset     Cancer Mother         skin     Alcohol abuse Mother      Hyperlipidemia Father      Breast cancer Paternal Aunt      Breast cancer Paternal Grandmother      Colon cancer Maternal Grandmother      Stroke Maternal Grandmother      Breast cancer Paternal Aunt      Breast  cancer Paternal Aunt      Breast cancer Paternal cousin      Breast cancer Paternal cousin      Social History     Socioeconomic History     Marital status: Single     Spouse name: Not on file     Number of children: Not on file     Years of education: Not on file     Highest education level: Not on file   Occupational History     Not on file   Social Needs     Financial resource strain: Not on file     Food insecurity:     Worry: Not on file     Inability: Not on file     Transportation needs:     Medical: Not on file     Non-medical: Not on file   Tobacco Use     Smoking status: Never Smoker     Smokeless tobacco: Never Used   Substance and Sexual Activity     Alcohol use: No     Comment: occ social drink     Drug use: No     Sexual activity: Yes     Partners: Male     Birth control/protection: Condom   Lifestyle     Physical activity:     Days per week: Not on file     Minutes per session: Not on file     Stress: Not on file   Relationships     Social connections:     Talks on phone: Not on file     Gets together: Not on file     Attends [employer] service: Not on file     Active member of club or organization: Not on file     Attends meetings of clubs or organizations: Not on file     Relationship status: Not on file     Intimate partner violence:     Fear of current or ex partner: Not on file     Emotionally abused: Not on file     Physically abused: Not on file     Forced sexual activity: Not on file   Other Topics Concern     Not on file   Social History Narrative     Not on file         Review of systems is as stated in HPI, and the remainder of the 10 system review is otherwise negative.    Objective:     Vitals:    10/30/19 0801   BP: 118/70   Patient Site: Left Arm   Patient Position: Sitting   Cuff Size: Adult Large   Pulse: 67   Temp: 98.4  F (36.9  C)   TempSrc: Oral   SpO2: 97%   Weight: (!) 234 lb 9 oz (106.4 kg)    Body mass index is 40.26 kg/m .    General appearance: alert, appears stated age and  cooperative  Head: Normocephalic, without obvious abnormality, atraumatic  Neurologic: Grossly normal  Psych: mood appropriate    PHQ-9: 10  AMERICA-7: 5        This note has been dictated using voice recognition software. Any grammatical or context distortions are unintentional and inherent to the the software.

## 2021-06-03 VITALS
OXYGEN SATURATION: 98 % | SYSTOLIC BLOOD PRESSURE: 108 MMHG | DIASTOLIC BLOOD PRESSURE: 65 MMHG | WEIGHT: 232.44 LBS | TEMPERATURE: 98.4 F | BODY MASS INDEX: 39.68 KG/M2 | HEART RATE: 81 BPM | HEIGHT: 64 IN

## 2021-06-03 VITALS
SYSTOLIC BLOOD PRESSURE: 118 MMHG | HEART RATE: 67 BPM | BODY MASS INDEX: 40.26 KG/M2 | TEMPERATURE: 98.4 F | WEIGHT: 234.56 LBS | DIASTOLIC BLOOD PRESSURE: 70 MMHG | OXYGEN SATURATION: 97 %

## 2021-06-03 VITALS — BODY MASS INDEX: 39.86 KG/M2 | HEIGHT: 64 IN | WEIGHT: 233.5 LBS

## 2021-06-03 NOTE — TELEPHONE ENCOUNTER
Left message to call back for: xray results  Information to relay to patient:  Ok to relay results upon return  call

## 2021-06-03 NOTE — PROGRESS NOTES
Assessment:        1. Physical exam  HIV Antigen/Antibody Screening [place]    Lipid [place]    Glucose    Gynecologic Cytology (PAP Smear)    Hemoglobin   2. Screening for human immunodeficiency virus  HIV Antigen/Antibody Screening [place]   3. Class 3 severe obesity due to excess calories with serious comorbidity and body mass index (BMI) of 40.0 to 44.9 in adult (H)  Glycosylated Hemoglobin A1c   4. Wheezing  albuterol (PROAIR HFA;PROVENTIL HFA;VENTOLIN HFA) 90 mcg/actuation inhaler    XR Chest 2 Views    PFT Complete   5. Seborrheic dermatitis of scalp  fluocinonide (LIDEX) 0.05 % external solution    ketoconazole (NIZORAL) 2 % shampoo   6. Eczema, unspecified type  triamcinolone (KENALOG) 0.1 % cream   7. Mild major depression (H)     8. Seborrheic keratosis         Plan:      1. Physical exam: Pap smear is performed today.  We will check lipids, glucose, HIV screen.  Encouraged regular exercise and healthy diet.  Encouraged weight loss efforts.  Follow-up in 1 year for yearly physical  2. Obesity: Check lipids, glucose and A1c.  Encouraged regular exercise and healthy lifestyle changes to promote weight loss  3. Wheezing: Chest x-ray is negative.  Will obtain pulmonary function tests to evaluate for asthma or other underlying obstructive or restrictive lung disease.  She has had benefit with albuterol inhaler in the past we will give her a new prescription for this.  Counseled on use of medication and side effects  4. Seborrheic dermatitis of scalp: We will treat with topical Lidex solution and ketoconazole shampoo.  Counseled on use of these medications  5. Eczema: Prescription provided for triamcinolone cream.  Recommend daily use of moisturizing cream such as CeraVe or Cetaphil  6. Mild major depression: Continue Lexapro at current dose  7. Seborrheic keratosis: Provided reassurance.  Did note that she has multiple moles and did encourage her to see dermatologist for full body skin cancer screening.         I have had an Advance Directives discussion with the patient.  The following are part of a depression follow up plan for the patient:  mental health care management  The following high BMI interventions were performed this visit: encouragement to exercise    Subjective:      Radha Dickerson is a 29 y.o. female who presents for an annual exam.  We reviewed her medications and allergies and updated the chart.  Reviewed family and social history as well.  She was recently seen for depression.  She has been seeing a therapist for a long time.  At last office visit, she requested to restart Lexapro, which she had taken in the past.  She has been on that medication now for nearly 3 weeks.  Reports it is going well.  She is having some headaches but she experienced this in the past with Lexapro and they eventually resolved.  She has an additional concerns that she would like to discuss today.  She has noticed a mole in the left groin area.  She is wondering if she possibly has asthma.  She always notices that there is some wheezing in her throat and chest.  It is worse during cold weather.  She has had an albuterol inhaler in the past which was prescribed to her.  She felt this helped her symptoms but it made her jittery.  Symptoms are also worse with exercise and when she has upper respiratory infections.  States that her lungs feel inflamed.  Feels like her airways get constricted.  She did grow up in a smoking home.  She also has very dry elbows and knees.  She is wondering if this is psoriasis.  She also has noticed some flaky skin near her eyebrows and on the side of her nose as well as her scalp.  This is itchy.  She has tried multiple antidandruff shampoos with limited benefits.  No other concerns or questions today.    Healthy Habits:   Regular Exercise: Yes  Sunscreen Use: Yes  Healthy Diet: Yes  Dental Visits Regularly: Yes  Seat Belt: Yes  Sexually active: Yes  Self Breast Exam Monthly:Yes  Hemoccults:  N/A  Flex Sig: N/A  Colonoscopy: N/A  Lipid Profile: Yes  Glucose Screen: Yes  Prevention of Osteoporosis: Yes  Last Dexa: N/A        Immunization History   Administered Date(s) Administered     HPV 9 Valent 09/20/2016     HPV Quadrivalent 03/16/2012     Influenza,live, Nasal Laiv4 08/27/2013     Influenza,seasonal,quad inj =/> 6months 10/30/2019     Meningococcal MCV4P 03/16/2012     Td, Adult, Absorbed 09/08/2004     Tdap 08/27/2013     Immunization status: up to date and documented.      Gynecologic History  Patient's last menstrual period was 11/04/2019.  Contraception: none  Last Pap: 3 years ago. Results were: normal        OB History   No obstetric history on file.       Current Outpatient Medications   Medication Sig Dispense Refill     escitalopram oxalate (LEXAPRO) 10 MG tablet Take 1/2 tablet by mouth daily for 1 week and then increase to 1 tablet by mouth daily 90 tablet 3     albuterol (PROAIR HFA;PROVENTIL HFA;VENTOLIN HFA) 90 mcg/actuation inhaler Inhale 2 puffs every 6 (six) hours as needed for wheezing. 1 each 3     fluocinonide (LIDEX) 0.05 % external solution Apply sparingly to scalp once or twice daily as needed. 60 mL 1     ketoconazole (NIZORAL) 2 % shampoo Apply to damp skin, lather, leave on 5 minutes, and rinse. Apply twice weekly for 2 to 4 weeks. 120 mL 1     triamcinolone (KENALOG) 0.1 % cream Apply sparingly to affected skin twice daily for up to 14 days 45 g 1     No current facility-administered medications for this visit.      Past Medical History:   Diagnosis Date     Ectopic pregnancy      Past Surgical History:   Procedure Laterality Date     TONSILLECTOMY       Grapefruit and Morphine  Family History   Problem Relation Age of Onset     Cancer Mother         skin     Alcohol abuse Mother      Hyperlipidemia Father      Breast cancer Paternal Aunt      Breast cancer Paternal Grandmother      Colon cancer Maternal Grandmother      Stroke Maternal Grandmother      Breast cancer  "Paternal Aunt      Breast cancer Paternal Aunt      Breast cancer Paternal cousin      Breast cancer Paternal cousin      Diabetes Other      Social History     Socioeconomic History     Marital status: Single     Spouse name: Not on file     Number of children: Not on file     Years of education: Not on file     Highest education level: Not on file   Occupational History     Not on file   Social Needs     Financial resource strain: Not on file     Food insecurity:     Worry: Not on file     Inability: Not on file     Transportation needs:     Medical: Not on file     Non-medical: Not on file   Tobacco Use     Smoking status: Never Smoker     Smokeless tobacco: Never Used   Substance and Sexual Activity     Alcohol use: No     Comment: occ social drink     Drug use: No     Sexual activity: Yes     Partners: Male     Birth control/protection: Condom   Lifestyle     Physical activity:     Days per week: Not on file     Minutes per session: Not on file     Stress: Not on file   Relationships     Social connections:     Talks on phone: Not on file     Gets together: Not on file     Attends Religion service: Not on file     Active member of club or organization: Not on file     Attends meetings of clubs or organizations: Not on file     Relationship status: Not on file     Intimate partner violence:     Fear of current or ex partner: Not on file     Emotionally abused: Not on file     Physically abused: Not on file     Forced sexual activity: Not on file   Other Topics Concern     Not on file   Social History Narrative     Not on file       Review of Systems    See HPI      Objective:         /65 (Patient Site: Right Arm, Patient Position: Sitting, Cuff Size: Adult Large)   Pulse 81   Temp 98.4  F (36.9  C) (Oral)   Ht 5' 3.5\" (1.613 m)   Wt (!) 232 lb 7 oz (105.4 kg)   LMP 11/04/2019   SpO2 98%   Breastfeeding No   BMI 40.53 kg/m        Physical Exam:  General Appearance: Alert, cooperative, no distress, " appears stated age  Head: Normocephalic, without obvious abnormality, atraumatic  Eyes: PERRL, conjunctiva/corneas clear, EOM's intact  Ears: Normal TM's and external ear canals, both ears  Nose: Nares normal, septum midline,mucosa normal, no drainage  Throat: Lips, mucosa, and tongue normal; teeth and gums normal  Neck: Supple, symmetrical, trachea midline, no adenopathy;  thyroid: not enlarged, symmetric, no tenderness/mass/nodules;   Back: Symmetric, no curvature, ROM normal  Lungs: Clear to auscultation bilaterally, respirations unlabored  Breasts: No breast masses, tenderness, asymmetry, or nipple discharge.  Heart: Regular rate and rhythm, S1 and S2 normal, no murmur, rub, or gallop, Abdomen: Soft, non-tender, bowel sounds active all four quadrants,  no masses, no organomegaly  Pelvic:Normally developed genitalia with no external lesions or eruptions. Vagina and cervix show no lesions, inflammation, discharge or tenderness. No cystocele, No rectocele. Uterus normal.  No adnexal mass or tenderness.  Extremities: Extremities normal, atraumatic, no cyanosis or edema  Skin: Seborrheic dermatitis eyebrows and scalp, eczema vs psoriasis anterior knees and posterior elbows, benign appearing seborrheic keratosis present left groin  Lymph nodes: Cervical, supraclavicular, and axillary nodes normal  Neurologic: Normal         Results: Chest x-ray was performed in clinic.  This was personally reviewed.  Per my read it was negative.    AMERICA-7: 4  PHQ-9:8

## 2021-06-03 NOTE — TELEPHONE ENCOUNTER
----- Message from Cassy Valenzuela MD sent at 11/14/2019  7:10 AM CST -----  Please let pt know that her chest xray is normal

## 2021-06-03 NOTE — PATIENT INSTRUCTIONS - HE
Use cetaphil or cerave cream to prevent dry skin    Consider skin cancer screen with dermatologist

## 2021-06-03 NOTE — TELEPHONE ENCOUNTER
Patient Returning Call  Reason for call:  tcb  Information relayed to patient:  Relayed normal x ray results to patient and verbalize understanding.  Patient has additional questions:  Yes  If YES, what are your questions/concerns:    Inquiring about lab results.  Advise patient that some test are pending and provider will reach out to her once all the tests are finalized. Patient agrees with plan  Okay to leave a detailed message?: Yes

## 2021-06-03 NOTE — TELEPHONE ENCOUNTER
Left message to call back for: X Ray results  Information to relay to patient:  Ok to relay results below upon return call.

## 2021-06-04 VITALS
WEIGHT: 235 LBS | DIASTOLIC BLOOD PRESSURE: 72 MMHG | HEART RATE: 66 BPM | BODY MASS INDEX: 40.98 KG/M2 | SYSTOLIC BLOOD PRESSURE: 124 MMHG

## 2021-06-04 VITALS — WEIGHT: 235 LBS | HEIGHT: 64 IN | BODY MASS INDEX: 40.12 KG/M2

## 2021-06-04 VITALS
BODY MASS INDEX: 40.98 KG/M2 | TEMPERATURE: 98.4 F | HEART RATE: 75 BPM | RESPIRATION RATE: 16 BRPM | SYSTOLIC BLOOD PRESSURE: 129 MMHG | OXYGEN SATURATION: 98 % | DIASTOLIC BLOOD PRESSURE: 87 MMHG | WEIGHT: 235 LBS

## 2021-06-05 VITALS — BODY MASS INDEX: 39.27 KG/M2 | HEIGHT: 64 IN | WEIGHT: 230 LBS

## 2021-06-05 NOTE — TELEPHONE ENCOUNTER
RN Triage:     Patient is calling in stating that she took an antidepressant (Lexapro 10 mg) and 30 minutes later she vomited a lot. Mostly water per patient. She was not able to see the pill in the vomit. She stated it was a lot of vomit. Patient asking if she could take another dose of the Lexapro 10 mg/ She was advised that she could take one and more than likely the medication was not absorbed. Home care suggestions for vomiting reviewed with patient.   Mandi Lorenz RN, BSN [facility] Triage Nurse      Reason for Disposition    Vomiting a prescription medication    Protocols used: VOMITING-A-AH
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
## 2021-06-09 NOTE — TELEPHONE ENCOUNTER
Left message for pt, DR Valenzuela needs to have her do a phone visit 7/10/2020 at 2:40 PM to discuss her work restrictions before forms can be filled out. Appointment has already been scheduled for her. Asked her  to return call to let us know if this time works for her.
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
## 2021-06-09 NOTE — TELEPHONE ENCOUNTER
Left message to call back for: appointment   Information to relay to patient:  Please confirm telephone appointment on 7/10/2020 at 2:40 pm with Dr. Valenzuela to have paperwork completed (see below message from Dr. Valenzuela's assistant)
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
## 2021-06-09 NOTE — PATIENT INSTRUCTIONS - HE
Letter for work given today.  Take the next week off of work.    Avoid screens, televisions, reading, or anything that would be considered to be stressful on your brain.    Eat small snacks throughout the day.  Stay well-hydrated.    You can go back to your chiropractor if you think you see benefit from this.    Cyclobenzaprine muscle relaxer 5 mg every 8 hours as needed for muscle stiffness.    Use heat on your neck.  Aspercreme or icy hot is also a good idea.    Gentle stretching.    Avoid strenuous activity.    Try to modify your activity when you get back to work.  Avoid things that make your symptoms worse.    If 2 to 3 weeks out, your symptoms are still severe, we could consider a referral to the concussion clinic at that point.

## 2021-06-10 NOTE — PROGRESS NOTES
"[facility] Rehabilitation Daily Progress     Patient Name: Radha Dickerson  Date: 2020  Visit #: 3  PTA visit #:  na  Referral Diagnosis: Post-concussion headache  Referring provider: Judith Friend FNP  Visit Diagnosis:     ICD-10-CM    1. Post-concussion headache  G44.309    2. Post concussion syndrome  F07.81        Past Medical History:   Diagnosis Date     Ectopic pregnancy      Mild major depression (H) 2019     Morbid obesity (H) 2019         Assessment:     HEP/POC compliance is  good .  Patient demonstrates understanding/independence with home program.  Response to Intervention Pt responded well to manual therapy today and overall is reporting less headache symptoms and neck pain.  Patient is benefitting from skilled physical therapy and is making steady progress toward functional goals.  Patient is appropriate to continue with skilled physical therapy intervention, as indicated by initial plan of care.    Goal Status: progressing  Pt. will demonstrate/verbalize independence in self-management of condition in : 4 weeks  Pt. will be independent with home exercise program in : 4 weeks  Pt. will report decreased intensity, frequency of : Headache  Patient will work: without restrictions;in 12 weeks    Pt will: be able a 30 min yoga or pilates video with <3/10 pain and no headache symptoms in 12 weeks.      Plan / Patient Education:     Continue with initial plan of care.  Progress with home program as tolerated.   Next session: Evaluate vestibular needs  progression of cervical and scapular strengthening (isometric rotation, SNAGs, prone Is)    Subjective:     Pain Ratin head- \"pulsating\", 4/10 right cervical spine and base of skull    Pt has a headache that started at 11 am on right side of her neck and temporal lobe. She foam rolled at 1:30pm and that felt good. She had a headache on Tuesday but not on Wednesday. This has been her best week at work so far.       Objective: " "    Cervical AROM:  Flexion: WNL with sharp pain along R upper trapezius  Ext: Mild - \"feels compressed\"  Rotation: R WNL, L mild    Pt reports no headache symptoms and less stiffness with cervical AROM after manual therapy    Exercises:  Exercise #1: supine chin tuck with towel under head - HEP, work up to 10 reps  Comment #1: cervical isometric lateral flexion- DC and retraction/extension - HEP  Exercise #2: rows with L2 band- HEP  Comment #2: pec stretch in doorway- HEP  Exercise #3: cervical lateral flexion in s/l x 5-10 reps        Treatment Today     TREATMENT MINUTES COMMENTS   Evaluation     Self-care/ Home management     Manual therapy 11 -supine MFR to cornelius sub-occipital muscles   -supine cervical traction with slight flexion 5 x 30 sec holds  -supine cervical lateral glides, grade II on R, 3 x 10 sec oscillations   Neuromuscular Re-education     Therapeutic Activity     Therapeutic Exercises 14 -see exercise flow sheet for date completed  -PTRx: [id number]   Gait training     Modality__________________                Total 25    Blank areas are intentional and mean the treatment did not include these items.       Dalia Funes, PT, DPT, CLT  8/13/2020    "

## 2021-06-10 NOTE — PROGRESS NOTES
[facility] Rehabilitation Daily Progress     Patient Name: Radha Dickerson  Date: 8/20/2020  Visit #: 4  PTA visit #:  na  Referral Diagnosis: Post-concussion headache  Referring provider: Judith Friend FNP  Visit Diagnosis:     ICD-10-CM    1. Post-concussion headache  G44.309    2. Post concussion syndrome  F07.81        Assessment:     HEP/POC compliance is  good .  Patient demonstrates understanding/independence with home program.  Response to Intervention Pt responding well to manual therapy. Overall, slight increase in headache symptoms today but is doing well with her HEP.  Patient is benefitting from skilled physical therapy and is making steady progress toward functional goals.  Patient is appropriate to continue with skilled physical therapy intervention, as indicated by initial plan of care.    Goal Status: progressing towards  Pt. will demonstrate/verbalize independence in self-management of condition in : 4 weeks  Pt. will be independent with home exercise program in : 4 weeks;Met  Pt. will report decreased intensity, frequency of : Headache  Patient will work: without restrictions;in 12 weeks    Pt will: be able a 30 min yoga or pilates video with <3/10 pain and no headache symptoms in 12 weeks.      Plan / Patient Education:     Continue with initial plan of care.  Progress with home program as tolerated.   Next session: Evaluate vestibular needs  Progression of cervical and scapular strengthening (SNAGs, prone Is)    Subjective:     Pain Rating: 3 head, 3/10 right cervical spine and base of skull    Pt woke up with a headache today. She did not feel well earlier this week and thinks that contributed to her pain. She missed a chiropractor and PT appt because of it. Pt reports the dizziness has decreased. She has been more sleepy recently but does have more energy at work.       Objective:     Cervical AROM:  Flexion: WNL  Ext: Mild   Rotation: R WNL, L WNL- with stretching in anterior  chest    Active trigger points and moderate hypertonicity along R cervical paraspinals/scalenes and upper trapezius    Exercises:  Exercise #1: supine chin tuck with towel under head - 10x 10 sec hold  Comment #1: cervical isometric retraction/extension - HEP  Exercise #2: rows with L2 band- HEP  Comment #2: pec stretch in doorway- 30 sec holds  Exercise #3: cervical lateral flexion in s/l x 5-10 reps        Treatment Today     TREATMENT MINUTES COMMENTS   Evaluation     Self-care/ Home management     Manual therapy 15 -supine MFR to cornelius sub-occipital muscles and R cervical scalenes, upper trapezius  -supine cervical traction with slight flexion 5 x 30 sec holds  -supine cervical lateral glides, grade II-III on R, 5 x 10 sec oscillations   Neuromuscular Re-education     Therapeutic Activity     Therapeutic Exercises 10 -see exercise flow sheet for date completed  -PTRx: [id number]   Gait training     Modality__________________                Total 25    Blank areas are intentional and mean the treatment did not include these items.       Dalia Funes, PT, DPT, CLT  8/20/2020
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
## 2021-06-10 NOTE — PROGRESS NOTES
[facility] Rehabilitation Daily Progress     Patient Name: Radha Dickerson  Date: 2020  Visit #: 2  PTA visit #:  na  Referral Diagnosis: Post-concussion headache  Referring provider: Judith Friend FNP  Visit Diagnosis:     ICD-10-CM    1. Post-concussion headache  G44.309    2. Post concussion syndrome  F07.81        Past Medical History:   Diagnosis Date     Ectopic pregnancy      Mild major depression (H) 2019     Morbid obesity (H) 2019         Assessment:     HEP/POC compliance is  good .  Patient demonstrates understanding/independence with home program.  Response to Intervention Pt has been compliant with her HEP 2x/day since her initial evaluation. She is reporting improvements in her headache symptoms and tolerated progression of HEP to include postural strengthening/stretching.  Patient is benefitting from skilled physical therapy and is making steady progress toward functional goals.  Patient is appropriate to continue with skilled physical therapy intervention, as indicated by initial plan of care.    Goal Status:  Pt. will demonstrate/verbalize independence in self-management of condition in : 4 weeks  Pt. will be independent with home exercise program in : 4 weeks  Pt. will report decreased intensity, frequency of : Headache  Patient will work: without restrictions;in 12 weeks    Pt will: be able a 30 min yoga or pilates video with <3/10 pain and no headache symptoms in 12 weeks.      Plan / Patient Education:     Continue with initial plan of care.  Progress with home program as tolerated. progression of cervical and scapular strengthening (isometric rotation, SNAGs, prone Is)    Subjective:     Pain Ratin head, 3/10 right cervical spine and base of skull  Pt reports the exercises are going well 2x/day. She does not have a headache today. Dizziness is about the same. Pt continues to go to the chiropractor 2x/week for an adjustment and massage.      Objective:  "    Cervical AROM:  Flexion: WNL with sharp pain along R upper trapezius  Ext: Mild - \"feels compressed\"  Rotation: R WNL, L mild    Exercises:  Exercise #1: supine chin tuck with towel under head 5  x 3-5 sec hold  Comment #1: cervical isometric lateral flexion and retraction/extension 5 x 3-5 sec holds  Exercise #2: rows with L2 band 20 reps, vc to prevent UT activation  Comment #2: pec stretch in doorway 2 x 30 sec hold        Treatment Today     TREATMENT MINUTES COMMENTS   Evaluation     Self-care/ Home management     Manual therapy 8 -supine MFR to cornelius sub-occipital muscles   -supine cervical traction with slight flexion 3 x 30 sec holds   Neuromuscular Re-education     Therapeutic Activity     Therapeutic Exercises 23 -see exercise flow sheet for date completed  -PTRx: [id number]   Gait training     Modality__________________                Total 31    Blank areas are intentional and mean the treatment did not include these items.       Dalia Funes, PT, DPT, CLT  8/11/2020      "
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
## 2021-06-10 NOTE — PROGRESS NOTES
[facility] Rehabilitation Daily Progress     Patient Name: Radha Dickerson  Date: 2020  Visit #: 5  PTA visit #:  na  Referral Diagnosis: Post-concussion headache  Referring provider: Judith Friend FNP  Visit Diagnosis:     ICD-10-CM    1. Post-concussion headache  G44.309    2. Post concussion syndrome  F07.81        Assessment:     HEP/POC compliance is  good .  Patient demonstrates understanding/independence with home program.  Response to Intervention Pt responding well to manual therapy. Overall, slight increase in headache symptoms today but is doing well with her HEP.  Patient is benefitting from skilled physical therapy and is making steady progress toward functional goals.  Patient is appropriate to continue with skilled physical therapy intervention, as indicated by initial plan of care.    Goal Status: progressing towards  Pt. will demonstrate/verbalize independence in self-management of condition in : 4 weeks  Pt. will be independent with home exercise program in : 4 weeks;Met  Pt. will report decreased intensity, frequency of : Headache  Patient will work: without restrictions;in 12 weeks    Pt will: be able a 30 min yoga or pilates video with <3/10 pain and no headache symptoms in 12 weeks.      Plan / Patient Education:     Continue with initial plan of care.  Progress with home program as tolerated.     Progression of cervical and scapular strengthening (SNAGs, prone Is)    Subjective:     Pain Ratin head,  right cervical spine and base of skull    Dull headache today at the base of the skull and on the right side of her head.  She is feeling a little dizzy lately and today: transitioning for sunglasses to none, standing up too quickly, turning around too quickly.  Dizziness usually last less than 5 minutes.       Objective:     Cervical AROM:  Flexion: WNL  Ext: Mild   Rotation: R WNL, L WNL- with stretching in anterior chest    Active trigger points and moderate hypertonicity  "along R cervical paraspinals/scalenes and upper trapezius    Oculomotor Assessment:  Ocular AROM:   Normal  Smooth Pursuit:   Normal  Saccades:   Normal  VOR:  dizziness        Exercises:  Exercise #1: supine chin tuck with towel under head - 10x 10 sec hold  Comment #1: cervical isometric retraction/extension - HEP  Exercise #2: rows with L2 band- HEP  Comment #2: pec stretch in doorway- 30 sec holds  Exercise #3: cervical lateral flexion in s/l x 5-10 reps  Comment #3: gaze stabilization X1 viewing-  horizontal  20\" ,   3 times/day        Treatment Today     TREATMENT MINUTES COMMENTS   Evaluation     Self-care/ Home management     Manual therapy 15 -supine MFR to cornelius sub-occipital muscles and R cervical scalenes, upper trapezius  -supine manual cervical traction with slight flexion 5 x 30 sec holds     Neuromuscular Re-education     Therapeutic Activity     Therapeutic Exercises 10 -see exercise flow sheet for date completed  -PTRx: [id number]   Gait training     Modality__________________                Total 25    Blank areas are intentional and mean the treatment did not include these items.       Kenneth Bailey, PT  8/25/2020  "

## 2021-06-11 NOTE — PROGRESS NOTES
"[facility] Rehabilitation Daily Progress     Patient Name: Radha Dickerson  Date: 9/14/2020  Visit #: 10  PTA visit #:  na  Referral Diagnosis: Post-concussion headache  Referring provider: Judith Friend FNP  Visit Diagnosis:     ICD-10-CM    1. Post-concussion headache  G44.309    2. Post concussion syndrome  F07.81        Assessment:     HEP/POC compliance is  good .  Patient demonstrates understanding/independence with home program.  Response to Intervention Overall, pt continues to report improvement in her neck pain and headaches.   Patient is benefitting from skilled physical therapy and is making steady progress toward functional goals.  Patient is appropriate to continue with skilled physical therapy intervention, as indicated by initial plan of care.    Goal Status: progressing towards  Pt. will demonstrate/verbalize independence in self-management of condition in : 4 weeks  Pt. will be independent with home exercise program in : 4 weeks;Met  Pt. will report decreased intensity, frequency of : Headache  Patient will work: without restrictions;in 12 weeks    Pt will: be able a 30 min yoga or pilates video with <3/10 pain and no headache symptoms in 12 weeks.      Plan / Patient Education:     Continue with initial plan of care.  Progress with home program as tolerated.     Review HEP, progression of cervical and scapular strengthening (SNAGs, prone Is), VOR x 2 as able, continue with MT as needed    Subjective:     Pain Rating: 3 R cervical spine    She's had a lot more dizziness today at work.  It would come and go while she was walking.  She took a break and then she didn't really notice after.  She noticed that she has more symptoms on Monday and thinks that it is because she hasn't been as active over the weekend.    Objective:     Cervical AROM:  Flexion: mild- \"stiff \" at end; improved  Ext: Mild with \"pinch on right side\"  Rotation: R WNL, L WNL- with pulling/tight in R anterior " "chest    Pt reports decreased stiffness and pinching with cervical AROM at end    Exercises:  Exercise #1: supine chin tuck with towel under head - 10x 7 sec hold  Comment #1: cervical isometric retraction/extension - HEP  Exercise #2: rows with L2 band- HEP  Comment #2: pec stretch in doorway- 30 sec holds  Exercise #3: cervical lateral flexion in s/l x 5-10 reps  Comment #3: gaze stabilization X1 viewing-  horizontal  20\" ,   x1-2 times/day- HEP  Exercise #4: reach and roll 5 x 30 sec hold  Comment #4: supine pec stretch over foam roller 3-5 min  Exercise #5: bridge 10-15 reps, 1-2 sets  Comment #5: levator scapulae stretch 2 x 30 sec hold    Manual therapy:  Neck  Manual therapy:  Neck    MFR layers 1-3 bilateral:  Suboccipitals, cervical extensors, cervical paraspinal rotators, scalenes.    Manual therapy:  Cervical longitudinal mobilization    Rate/grade Target  Direction  Relative movement Location in range Patient position   2 Cervical vertebrae Superior Distraction of facet joints Head in neutral Supine          Treatment Today     TREATMENT MINUTES COMMENTS   Evaluation     Self-care/ Home management     Manual therapy 17 See above   Neuromuscular Re-education     Therapeutic Activity     Therapeutic Exercises 8 -see exercise flow sheet for date completed  -verbal review of HEP  -PTRx: [id number]   Gait training     Modality__________________                Total 25    Blank areas are intentional and mean the treatment did not include these items.       Kenneth Bailey, PT  9/14/2020  "
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
## 2021-06-11 NOTE — PROGRESS NOTES
[facility] Rehabilitation Daily Progress     Patient Name: Radha Dickerson  Date: 2020  Visit #: 8  PTA visit #:  na  Referral Diagnosis: Post-concussion headache  Referring provider: Judith Friend FNP  Visit Diagnosis:     ICD-10-CM    1. Post-concussion headache  G44.309    2. Post concussion syndrome  F07.81        Assessment:     HEP/POC compliance is  good .  Patient demonstrates understanding/independence with home program.  Response to Intervention Continued anterior chest tightness and provider added thoracic mobility and abdominal strengthening to address in HEP today.  Patient is benefitting from skilled physical therapy and is making steady progress toward functional goals.  Patient is appropriate to continue with skilled physical therapy intervention, as indicated by initial plan of care.    Goal Status: progressing towards  Pt. will demonstrate/verbalize independence in self-management of condition in : 4 weeks  Pt. will be independent with home exercise program in : 4 weeks;Met  Pt. will report decreased intensity, frequency of : Headache  Patient will work: without restrictions;in 12 weeks    Pt will: be able a 30 min yoga or pilates video with <3/10 pain and no headache symptoms in 12 weeks.      Plan / Patient Education:     Continue with initial plan of care.  Progress with home program as tolerated.     Review new HEP, progression of cervical and scapular strengthening (SNAGs, prone Is), possible decrease to 1x/week after next 2 weeks    Subjective:     Pain Ratin    Exercises are going well.  She has been doing the gaze stabilization exercises 3x/day.  No headache today.  No pain. This is the best she has felt yet.  She was able to drive yesterday without feeling like she constantly needed to stretch or readjust her posture.    Objective:     Cervical AROM:  Flexion: WNL  Ext: Mild   Rotation: R WNL, L WNL- with stretching in anterior chest    Tightness in anterior chest with  "stretching today      Exercises:  Exercise #1: supine chin tuck with towel under head - 10x 10 sec hold  Comment #1: cervical isometric retraction/extension - HEP  Exercise #2: rows with L2 band- HEP  Comment #2: pec stretch in doorway- 30 sec holds  Exercise #3: cervical lateral flexion in s/l x 5-10 reps  Comment #3: gaze stabilization X1 viewing-  horizontal  20\" ,   3 times/day- HEP, has tried once  Exercise #4: reach and roll 5 x 30 sec hold  Comment #4: supine pec stretch over foam roller 3-5 min  Exercise #5: bridge 10-15 reps, 1-2 sets    Manual therapy:  Neck    MFR layers 1-3 bilateral:  Suboccipitals, cervical extensors, cervical paraspinal rotators, scalenes.    Manual therapy:  Cervical longitudinal mobilization    Rate/grade Target  Direction  Relative movement Location in range Patient position   2 Cervical vertebrae Superior Distraction of facet joints Head in neutral Supine          Treatment Today     TREATMENT MINUTES COMMENTS   Evaluation     Self-care/ Home management     Manual therapy 25 See above   Neuromuscular Re-education     Therapeutic Activity     Therapeutic Exercises  -see exercise flow sheet for date completed  -PTRx: [id number]   Gait training     Modality__________________                Total 25    Blank areas are intentional and mean the treatment did not include these items.       Kenneth Bailey, PT  9/4/2020  "

## 2021-06-11 NOTE — PROGRESS NOTES
[facility] Rehabilitation Daily Progress     Patient Name: Radha Dickerson  Date: 2020  Visit #: 11  PTA visit #:  na  Referral Diagnosis: Post-concussion headache  Referring provider: Judith Friend FNP  Visit Diagnosis:     ICD-10-CM    1. Post-concussion headache  G44.309    2. Post concussion syndrome  F07.81        Assessment:     HEP/POC compliance is  good .  Patient demonstrates understanding/independence with home program.  Response to Intervention Overall, pt continues to report improvement in her neck pain and headaches.   Patient is benefitting from skilled physical therapy and is making steady progress toward functional goals.  Patient is appropriate to continue with skilled physical therapy intervention, as indicated by initial plan of care.    Goal Status: progressing towards  Pt. will demonstrate/verbalize independence in self-management of condition in : 4 weeks  Pt. will be independent with home exercise program in : 4 weeks;Met  Pt. will report decreased intensity, frequency of : Headache  Patient will work: without restrictions;in 12 weeks    Pt will: be able a 30 min yoga or pilates video with <3/10 pain and no headache symptoms in 12 weeks.      Plan / Patient Education:     Continue with initial plan of care.  Progress with home program as tolerated.     Review HEP, progression of cervical and scapular strengthening (SNAGs, prone Is), VOR x 2 as able, continue with MT as needed    Subjective:     Pain Ratin     Feeling pretty good.  She had a massage yesterday.  No pain today.  She is still having intermittent dizzy spells still.  She had some increased dizziness at work today, while sitting looking at the computer.  It lasted for approximately 2 minutes, but feels like she has some low level of dizziness all the time.    Yesterday she had a sudden episode of dizziness and imbalance and lost her balance at home while standing still.  She caught herself on the counter.  She  "didn't do any gaze stabilization exercises yesterday because of symptoms.    Objective:     Cervical AROM:  Flexion: mild- \"stiff \" at end; improved  Ext: Mild with \"pinch on right side\"  Rotation: R WNL, L WNL- with pulling/tight in R anterior chest    Pt reports decreased stiffness and pinching with cervical AROM at end    Exercises:  Exercise #1: supine chin tuck with towel under head -  Re-instruction with verbal and tactile cuing.   x 10 instruction for 10\" x 10 reps.  Comment #1: cervical isometric retraction/extension - 5\" x 5  Exercise #2: rows with L2 band- HEP--verbal review  Comment #2: pec stretch in doorway- 30 sec holds  verbal review  Exercise #3: cervical lateral flexion in s/l x 5-10 reps  Comment #3: gaze stabilization X1 viewing-  horizontal  re-instruction for intensity/speed 75% of max  Exercise #4: reach and roll 5 x 30 sec hold--patient demonstration  Comment #4: supine pec stretch over foam roller 3-5 min  Exercise #5: bridge patient demonstration with good technique  currently doing 2 sets of 5 reps at home.  Comment #5: levator scapulae stretch 2 x 30 sec hold  patient demonstration with head nodding.      Treatment Today     TREATMENT MINUTES COMMENTS   Evaluation     Self-care/ Home management     Manual therapy     Neuromuscular Re-education     Therapeutic Activity     Therapeutic Exercises 25 -see exercise flow sheet for date completed  -verbal review of HEP  -PTRx: [id number]   Gait training     Modality__________________                Total 25    Blank areas are intentional and mean the treatment did not include these items.       Kenneth Bailey, PT  9/17/2020  "

## 2021-06-11 NOTE — PROGRESS NOTES
"[facility] Rehabilitation Daily Progress     Patient Name: Radha Dickerson  Date: 2020  Visit #:   PTA visit #:  na  Referral Diagnosis: Post-concussion headache  Referring provider: Judith Friend FNP  Visit Diagnosis:     ICD-10-CM    1. Post-concussion headache  G44.309    2. Post concussion syndrome  F07.81        Assessment:     HEP/POC compliance is  good .  Patient demonstrates understanding/independence with home program.  Response to Intervention Pt reporting improvements in her neck pain, cervical AROM, and headaches. She is increasing her work hours this week and tolerated progression of strengthening exercises well today.  Patient is benefitting from skilled physical therapy and is making steady progress toward functional goals.  Patient is appropriate to continue with skilled physical therapy intervention, as indicated by initial plan of care.    Goal Status: progressing towards  Pt. will demonstrate/verbalize independence in self-management of condition in : 4 weeks  Pt. will be independent with home exercise program in : 4 weeks;Met  Pt. will report decreased intensity, frequency of : Headache  Patient will work: without restrictions;in 12 weeks    Pt will: be able a 30 min yoga or pilates video with <3/10 pain and no headache symptoms in 12 weeks.      Plan / Patient Education:     Continue with initial plan of care.  Progress with home program as tolerated.     Review HEP, progression of cervical and scapular strengthening (serratus star, prone Is), VOR x 2 as able, continue with MT as needed    Subjective:     Pain Ratin , tightness in upper thoracic spine    Her headaches feel like they are \"tapering off\". She can tell the difference between tension headaches and a headache from her concussion. Her neck was pretty good last week- she had a massage and with PT. She realized she needs to be more active over the weekend and that helps with her neck pain. She notices a " "pinch in her neck that improved with doing her exercises. She is working 2 days a week at 6 hours (Tuesday, Thursday) and then is working 4 hours the other days.     Dizziness is still the same- but she did not feel it today at work. Otherwise during the day she has about 1 episode of dizziness a day. She notices it on the computer - especially with scrolling and tracking items.     Objective:     Cervical AROM:  Flexion: WNL   Ext: Mild - \"pinch on the right side\"- it usually improves with the chiropractor  Rotation: R WNL, L WNL  Lateral flexion: WNL cornelius    Attempted quadriped exercises with increased L wrist pain so discontinued, educated pt to rest/ice for 1-2 weeks then slowly return to weightbearing exercises in yoga as pain allows. Can try fist positioning if tolerated.    Exercises:  Exercise #1: supine chin tuck with towel under head -  Re-instruction with verbal and tactile cuing.   x 10 instruction for 10\" x 10 reps.  Comment #1: cervical isometric retraction/extension - 5\" x 5  Exercise #2: rows with L2 band- HEP--verbal review  Comment #2: pec stretch in doorway- 30 sec holds  verbal review  Exercise #3: cervical lateral flexion in s/l x 5-10 reps  Comment #3: gaze stabilization X1 viewing-  horizontal  re-instruction for intensity/speed 75% of max  Exercise #4: reach and roll 5 x 30 sec hold--patient demonstration  Comment #4: supine pec stretch over foam roller 3-5 min  Exercise #5: bridge patient demonstration with good technique  currently doing 2 sets of 5 reps at home.  Comment #5: levator scapulae stretch 2 x 30 sec hold  patient demonstration with head nodding.      Treatment Today     TREATMENT MINUTES COMMENTS   Evaluation     Self-care/ Home management     Manual therapy     Neuromuscular Re-education     Therapeutic Activity     Therapeutic Exercises 28 -see exercise flow sheet for date completed  -PTRx: [id number]   Gait training     Modality__________________                Total 28  "   Blank areas are intentional and mean the treatment did not include these items.       Dalia Funes, PT  9/21/2020

## 2021-06-11 NOTE — PROGRESS NOTES
"[facility] Rehabilitation Daily Progress     Patient Name: Radha Dickerson  Date: 9/10/2020  Visit #: 9  PTA visit #:  na  Referral Diagnosis: Post-concussion headache  Referring provider: Judith Friend FNP  Visit Diagnosis:     ICD-10-CM    1. Post-concussion headache  G44.309    2. Post concussion syndrome  F07.81        Assessment:     HEP/POC compliance is  good .  Patient demonstrates understanding/independence with home program.  Response to Intervention Overall, pt continues to report improvement in her neck pain and headaches.   Patient is benefitting from skilled physical therapy and is making steady progress toward functional goals.  Patient is appropriate to continue with skilled physical therapy intervention, as indicated by initial plan of care.    Goal Status: progressing towards  Pt. will demonstrate/verbalize independence in self-management of condition in : 4 weeks  Pt. will be independent with home exercise program in : 4 weeks;Met  Pt. will report decreased intensity, frequency of : Headache  Patient will work: without restrictions;in 12 weeks    Pt will: be able a 30 min yoga or pilates video with <3/10 pain and no headache symptoms in 12 weeks.      Plan / Patient Education:     Continue with initial plan of care.  Progress with home program as tolerated.     Review HEP, progression of cervical and scapular strengthening (SNAGs, prone Is), VOR x 2 as able, continue with MT as needed    Subjective:     Pain Rating: 3 R cervical spine    Pt was doing well last week. She had to miss work yesterday- due to pain in her neck and shoulder. She feels more stiffness/tightness in her neck. She was able to go into work today. Headaches have continued to improve but she did have one yesterday- less frequent and intense. She is working 4 hours and in about 2 weeks she is going up to 6 hours a day, 2x/day.    Objective:     Cervical AROM:  Flexion: mild- \"stiff \" at end; improved  Ext: Mild with " "\"pinch on right side\"  Rotation: R WNL, L WNL- with pulling/tight in R anterior chest    Pt reports decreased stiffness and pinching with cervical AROM at end    Exercises:  Exercise #1: supine chin tuck with towel under head - 10x 10 sec hold  Comment #1: cervical isometric retraction/extension - HEP  Exercise #2: rows with L2 band- HEP  Comment #2: pec stretch in doorway- 30 sec holds  Exercise #3: cervical lateral flexion in s/l x 5-10 reps  Comment #3: gaze stabilization X1 viewing-  horizontal  20\" ,   3 times/day- HEP, has tried once  Exercise #4: reach and roll 5 x 30 sec hold  Comment #4: supine pec stretch over foam roller 3-5 min  Exercise #5: bridge 10-15 reps, 1-2 sets    Manual therapy:  Neck      Treatment Today     TREATMENT MINUTES COMMENTS   Evaluation     Self-care/ Home management     Manual therapy 16 -supine MFR to cornelius cervical scalenes, and upper trapezius  -supine MFR to R pec muscle near insertion along clavicle  -supine cervical lateral glides, grade II-III 3 x 10 sec oscillations  -prone STM to cornelius levator scapulae  -prone central PAs to T1-T6 4 x 10 sec oscillations, grade II-III   Neuromuscular Re-education     Therapeutic Activity     Therapeutic Exercises 12 -see exercise flow sheet for date completed  -verbal review of HEP  -PTRx: [id number]   Gait training     Modality__________________                Total 28    Blank areas are intentional and mean the treatment did not include these items.       Dalia Funes, PT  9/10/2020  "

## 2021-06-11 NOTE — PROGRESS NOTES
[facility] Rehabilitation Daily Progress     Patient Name: Radha Dickerson  Date: 2020  Visit #: 7  PTA visit #:  na  Referral Diagnosis: Post-concussion headache  Referring provider: Judith Friend FNP  Visit Diagnosis:     ICD-10-CM    1. Post-concussion headache  G44.309    2. Post concussion syndrome  F07.81        Assessment:     HEP/POC compliance is  good .  Patient demonstrates understanding/independence with home program.  Response to Intervention Continued anterior chest tightness and provider added thoracic mobility and abdominal strengthening to address in HEP today.  Patient is benefitting from skilled physical therapy and is making steady progress toward functional goals.  Patient is appropriate to continue with skilled physical therapy intervention, as indicated by initial plan of care.    Goal Status: progressing towards  Pt. will demonstrate/verbalize independence in self-management of condition in : 4 weeks  Pt. will be independent with home exercise program in : 4 weeks;Met  Pt. will report decreased intensity, frequency of : Headache  Patient will work: without restrictions;in 12 weeks    Pt will: be able a 30 min yoga or pilates video with <3/10 pain and no headache symptoms in 12 weeks.      Plan / Patient Education:     Continue with initial plan of care.  Progress with home program as tolerated.     Review new HEP, progression of cervical and scapular strengthening (SNAGs, prone Is), possible decrease to 1x/week after next 2 weeks    Subjective:     Pain Ratin- 6 headache after eating lunch today.     Exercises are going well.  Gaze stabilization exercises 2x/day except for one day. Instructed patient to increase to 3x/day for gaze stabilization exercises.    Headache is on the left, not the right today.  Neck pain is on the right, pinching. She thinks she may have slept wrong.    Anterior chest pain resolved with exercises and chiropractic treatment for rib.    Objective:  "    Cervical AROM:  Flexion: WNL  Ext: Mild   Rotation: R WNL, L WNL- with stretching in anterior chest    Tightness in anterior chest with stretching today      Exercises:  Exercise #1: supine chin tuck with towel under head - 10x 10 sec hold  Comment #1: cervical isometric retraction/extension - HEP  Exercise #2: rows with L2 band- HEP  Comment #2: pec stretch in doorway- 30 sec holds  Exercise #3: cervical lateral flexion in s/l x 5-10 reps  Comment #3: gaze stabilization X1 viewing-  horizontal  20\" ,   3 times/day- HEP, has tried once  Exercise #4: reach and roll 5 x 30 sec hold  Comment #4: supine pec stretch over foam roller 3-5 min  Exercise #5: bridge 10-15 reps, 1-2 sets    Manual therapy:  Neck    MFR layers 1-3 bilateral:  Suboccipitals, cervical extensors, cervical paraspinal rotators, scalenes.    Manual therapy:  Cervical longitudinal mobilization    Rate/grade Target  Direction  Relative movement Location in range Patient position   2 Cervical vertebrae Superior Distraction of facet joints Head in neutral Supine          Treatment Today     TREATMENT MINUTES COMMENTS   Evaluation     Self-care/ Home management     Manual therapy 30 See above   Neuromuscular Re-education     Therapeutic Activity     Therapeutic Exercises 5 -see exercise flow sheet for date completed  -PTRx: [id number]   Gait training     Modality__________________                Total 35    Blank areas are intentional and mean the treatment did not include these items.       Kenneth Bailey, PT  9/1/2020  "
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
## 2021-06-12 NOTE — PROGRESS NOTES
[facility] Daily Progress     Patient Name: Radha Dickerson  Date: 10/6/2020  Visit #:   PTA visit #:  na  Referral Diagnosis: Post-concussion headache  Referring provider: Judith Friend FNP  Visit Diagnosis:     ICD-10-CM    1. Post-concussion headache  G44.309    2. Post concussion syndrome  F07.81        Assessment:     HEP/POC compliance is  good .  Patient demonstrates understanding/independence with home program.  Response to Intervention Pt continues to report improvements in her neck pain, headaches and cervical AROM. She continues to have dizziness which is her main complaint now.  Patient is benefitting from skilled physical therapy and is making steady progress toward functional goals.  Patient is appropriate to continue with skilled physical therapy intervention, as indicated by initial plan of care.    Goal Status: progressing towards  Pt. will demonstrate/verbalize independence in self-management of condition in : 4 weeks;Met  Pt. will be independent with home exercise program in : 4 weeks;Met  Pt. will report decreased intensity, frequency of : Headache;Progressing toward (1 or less a week)  Patient will work: without restrictions;in 12 weeks (working- 24 hours a week)    Pt will: be able a 30 min yoga or pilates video with <3/10 pain and no headache symptoms in 12 weeks. (limited progress due to dizziness)      Plan / Patient Education:     Continue with initial plan of care.  Progress with home program as tolerated.     Reassess for BPPV, progress dizziness as able, VOR x 2 as able    Subjective:     Pain Ratin     Pt reports increased dizziness last week. She has had to re-schedule some appointments due to her work schedule. Her neck is starting to feel like it is back to normal. The most consistent symptom now is dizziness- everyday. She finds the exercises are harder to do when she is dizzy (VOR). She feels dizzy with rolling in bed that resolves quickly.  "She had a headache last night but that was the first one in a while.    Objective:     Cervical AROM:  Flexion: WNL   Ext: WNL  Rotation: R WNL, L WNL  Lateral flexion: WNL cornelius    Limit exercises with head motion to help reduce dizziness    Exercises:  Exercise #1: supine chin tuck with towel under head - HEP  instruction for 10\" x 10 reps.  Comment #1: cervical isometric retraction/extension - 5\" x 5  Exercise #2: rows with L3 band- HEP--verbal review, provided updated band  Comment #2: pec stretch in doorway- 30 sec holds  verbal review  Exercise #3: cervical lateral flexion in s/l x 5-10 reps, feels muscle fatigue, will add 1 more set (3 total)  Comment #3: gaze stabilization X1 viewing-  horizontal  re-instruction for intensity/speed 75% of max  Exercise #4: reach and roll 5 x 30 sec hold--patient demonstration  Comment #4: supine pec stretch over foam roller 3-5 min  Exercise #5: bridge patient demonstration with good technique  currently doing 2 sets of 5 reps at home.  Comment #5: levator scapulae stretch 2 x 30 sec hold  patient demonstration with head nodding.  Exercise #6: cat/cow and thread the needle- HEP  Comment #6: modified plank 2 x 30 sec hold, attempted plank with <10 sec due to fatigue      Treatment Today     TREATMENT MINUTES COMMENTS   Evaluation     Self-care/ Home management     Manual therapy     Neuromuscular Re-education     Therapeutic Activity     Therapeutic Exercises 25 -see exercise flow sheet for date completed  -PTRx: [id number]   Gait training     Modality__________________                Total 25    Blank areas are intentional and mean the treatment did not include these items.       Dalia Funes, PT  10/6/2020  "

## 2021-06-12 NOTE — PROGRESS NOTES
[facility] Discharge Summary  Patient Name: Radha Dickerson  Date: 1/21/2021  Referral Diagnosis: Post-concussion headache  Referring provider: Judith Friend FNP  Visit Diagnosis:   1. Post-concussion headache     2. Post concussion syndrome         Goals:  No data recorded  No data recorded    Patient was seen for 14 visits physical therapy.    The patient attended therapy initially, but did not finish the therapy sessions prescribed.  Goals were not fully achieved. Explanation for goals not achieved: The patient discontinued therapy, did not return.    Therapy will be discontinued at this time.  Please see progress note dated 10/12/2020 for patient status.      Thank you for your referral.  Kenneth Bailey, PT  1/21/2021  9:25 AM        [facility] Daily Progress     Patient Name: Radha Dickerson  Date: 10/12/2020  Visit #: 14/12-16  PTA visit #:  na  Referral Diagnosis: Post-concussion headache  Referring provider: Judith Friend FNP  Visit Diagnosis:     ICD-10-CM    1. Post-concussion headache  G44.309    2. Post concussion syndrome  F07.81        Assessment:     HEP/POC compliance is  good .  Patient demonstrates understanding/independence with home program.  Response to Intervention Pt continues to report improvements in her neck pain, headaches and cervical AROM. She continues to have dizziness which is her main complaint now.  Patient is benefitting from skilled physical therapy and is making steady progress toward functional goals.  Patient is appropriate to continue with skilled physical therapy intervention, as indicated by initial plan of care.    Goal Status: progressing towards  Pt. will demonstrate/verbalize independence in self-management of condition in : 4 weeks;Met  Pt. will be independent with home exercise program in : 4 weeks;Met  Pt. will report decreased intensity, frequency of : Headache;Progressing toward (1 or less a week)  Patient  "will work: without restrictions;in 12 weeks (working- 24 hours a week)    Pt will: be able a 30 min yoga or pilates video with <3/10 pain and no headache symptoms in 12 weeks. (limited progress due to dizziness)      Plan / Patient Education:     Continue with initial plan of care.  Progress with home program as tolerated.     x2 trial, patterned background.    Subjective:     Pain Ratin     Feeling more of the dizziness when rolling over in bed and with lying down and sitting up.  She still has some sensitivity to light.   Her neck continues to feel a lot better.    Objective:     Cervical AROM:  Flexion: WNL   Ext: WNL  Rotation: R WNL, L WNL  Lateral flexion: WNL cornelius    Supine lying neg  Head roll left and right neg  [place] left and right neg    Saccades vertical make her feel dizzy.  Vertical VOR maker her feel dizzy.    Exercises:  Exercise #1: supine chin tuck with towel under head - HEP  instruction for 10\" x 10 reps. last 1-2 reps hold for 20-30 sec if able (endurance hold)  Comment #1: cervical isometric retraction/extension - up to 10 sec  Exercise #2: rows with L3 band- HEP- work up to 3 sets  Comment #2: pec stretch in doorway- 30 sec holds  verbal review  Exercise #3: cervical lateral flexion in s/l x 5-10 reps, feels muscle fatigue, will add 1 more set (3 total)  Comment #3: gaze stabilization X1 viewing-  horizontal  75% of max, progressed to vertical, and vertical with head turned 45 degrees left and right.  Exercise #4: reach and roll 5 x 30 sec hold--patient demonstration  Comment #4: supine pec stretch over foam roller 3-5 min  Exercise #5: bridge- going until fatigue, doing 2-3 sets, feels she can do more  Comment #5: levator scapulae stretch 2 x 30 sec hold  patient demonstration with head nodding.  Exercise #6: cat/cow and thread the needle- HEP  Comment #6: modified plank 2 x 30 sec hold, cues on adjustment of elbow positioning      Treatment Today     TREATMENT MINUTES COMMENTS "   Evaluation     Self-care/ Home management     Manual therapy     Neuromuscular Re-education 15    Therapeutic Activity     Therapeutic Exercises  -see exercise flow sheet for date completed  -PTRx: [id number]   Gait training     Modality__________________                Total 15    Blank areas are intentional and mean the treatment did not include these items.       Kenneth Bailey, PT  10/12/2020
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
## 2021-06-13 NOTE — TELEPHONE ENCOUNTER
RN cannot approve Refill Request    RN can NOT refill this medication med is not covered by policy/route to provider. Last office visit: 10/30/2019 Cassy Valenzuela MD Last Physical: 11/13/2019 Last MTM visit: Visit date not found Last visit same specialty: 7/1/2020 Aguilar Guerra CNP.  Next visit within 3 mo: Visit date not found  Next physical within 3 mo: Visit date not found      Shandra Wilson, [facility] Connection Triage/Med Refill 12/3/2020    Requested Prescriptions   Pending Prescriptions Disp Refills     fluocinonide (LIDEX) 0.05 % external solution [Pharmacy Med Name: Fluocinonide External Solution 0.05 %] 60 mL 0     Sig: Apply sparingly to scalp once or twice daily as needed.       There is no refill protocol information for this order      Signed Prescriptions Disp Refills    escitalopram oxalate (LEXAPRO) 10 MG tablet 90 tablet 1     Sig: Take 1/2 tablet by mouth daily for 1 week and then increase to 1 tablet by mouth daily       SSRI Refill Protocol  Passed - 12/2/2020  8:28 PM        Passed - PCP or prescribing provider visit in last year     Last office visit with prescriber/PCP: 10/30/2019 Cassy Valenzuela MD OR same dept: Visit date not found OR same specialty: 7/1/2020 Aguilar Guerra CNP  Last physical: 11/13/2019 Last MTM visit: Visit date not found   Next visit within 3 mo: Visit date not found  Next physical within 3 mo: Visit date not found  Prescriber OR PCP: Cassy Valenzuela MD  Last diagnosis associated with med order: 1. Mild major depression (H)  - escitalopram oxalate (LEXAPRO) 10 MG tablet; Take 1/2 tablet by mouth daily for 1 week and then increase to 1 tablet by mouth daily  Dispense: 90 tablet; Refill: 1    2. Seborrheic dermatitis of scalp  - fluocinonide (LIDEX) 0.05 % external solution [Pharmacy Med Name: Fluocinonide External Solution 0.05 %]; Apply sparingly to scalp once or twice daily as needed.  Dispense: 60 mL; Refill: 0    If protocol passes may refill for 12 months if within 3  months of last provider visit (or a total of 15 months).

## 2021-06-13 NOTE — TELEPHONE ENCOUNTER
Refill Approved    Rx renewed per Medication Renewal Policy. Medication was last renewed on 10/30/19.    Shandra Wilson, [facility] Triage/Med Refill 12/3/2020     Requested Prescriptions   Pending Prescriptions Disp Refills     escitalopram oxalate (LEXAPRO) 10 MG tablet [Pharmacy Med Name: Escitalopram Oxalate Oral Tablet 10 MG] 90 tablet 0     Sig: Take 1/2 tablet by mouth daily for 1 week and then increase to 1 tablet by mouth daily       SSRI Refill Protocol  Passed - 12/2/2020  8:28 PM        Passed - PCP or prescribing provider visit in last year     Last office visit with prescriber/PCP: 10/30/2019 Cassy Valenzuela MD OR same dept: Visit date not found OR same specialty: 7/1/2020 Aguilar Guerra CNP  Last physical: 11/13/2019 Last MTM visit: Visit date not found   Next visit within 3 mo: Visit date not found  Next physical within 3 mo: Visit date not found  Prescriber OR PCP: Cassy Valenzuela MD  Last diagnosis associated with med order: 1. Mild major depression (H)  - escitalopram oxalate (LEXAPRO) 10 MG tablet [Pharmacy Med Name: Escitalopram Oxalate Oral Tablet 10 MG]; Take 1/2 tablet by mouth daily for 1 week and then increase to 1 tablet by mouth daily  Dispense: 90 tablet; Refill: 0    2. Seborrheic dermatitis of scalp  - fluocinonide (LIDEX) 0.05 % external solution [Pharmacy Med Name: Fluocinonide External Solution 0.05 %]; Apply sparingly to scalp once or twice daily as needed.  Dispense: 60 mL; Refill: 0    If protocol passes may refill for 12 months if within 3 months of last provider visit (or a total of 15 months).                fluocinonide (LIDEX) 0.05 % external solution [Pharmacy Med Name: Fluocinonide External Solution 0.05 %] 60 mL 0     Sig: Apply sparingly to scalp once or twice daily as needed.       There is no refill protocol information for this order

## 2021-06-16 NOTE — PROGRESS NOTES
"Radha Dickerson is a 30 y.o. female who is being evaluated via a billable telephone visit.      What phone number would you like to be contacted at? [phone]  How would you like to obtain your AVS? AVS Preference: [place].    Assessment & Plan     Radha was seen today for depression and discuss depression vs hormone issues.    Diagnoses and all orders for this visit:    Mild major depression (H)  Her PHQ-9 score today is 10 which is consistent with mild depression.  We discussed that it is possible that the dips in her mood could be hormonally related.  We discussed possibly starting her back on birth control to help regulate her hormones.  She is not sure if she wants to do this and would like to think about it for a while.  We also discussed that she may benefit from an increase in dosage of her Escitalopram.  She is also not sure if she would like to do that either and would like to think about this option.  Suggested doing some lab work to evaluate for thyroid dysfunction and vitamin D deficiency.  She will contact me if she would like to come in for those labs.  She will contact me if she would like to try birth control or increase the dosage of her Escitalopram.  She will continue to meet with her therapist on a regular basis.               BMI:   Estimated body mass index is 39.48 kg/m  as calculated from the following:    Height as of 11/19/20: 5' 4\" (1.626 m).    Weight as of 11/19/20: 230 lb (104.3 kg).       No follow-ups on file.    Cassy Valenzuela MD  [facility]    Subjective   Radha Dickerson is 30 y.o. and presents today for the following health issues   HPI   She is evaluated today via virtual encounter to discuss symptoms of depression.  Attempted video visit but had to transition over to a telephone encounter as we were not able to get the sound to work.  Reviewed her current medications and allergies as well as interim health history since her last office visit.  She has " history of depression.  She is currently on Escitalopram 10 mg daily.  She sees a therapist every 1 to 2 weeks.  Over the last few months she has noticed that every 4 to 6 weeks she will notice a dip in her mood symptoms.  It almost feels like she is not taking her antidepressant medication during these dips even though she is consistently taking the medication as prescribed.  She does not use any illicit drugs and does not use alcohol.  Her therapist suggested that she speak with her doctor to see if these dips in her mood symptoms could be related to hormones.  She reports that she does get her periods on a regular basis and they occur about every 4 to 5 weeks.  She does not notice the dip in her mood when she is on her menstrual period.  But she notices it about 1 to 2 weeks before her period starts.  She did take birth control in the past.  She did not like how she felt when she used the NuvaRing.  She does not remember if it seemed to have any effect on her mood.  She has no other questions today.          Review of Systems   All other systems reviewed and are negative.          Objective    Vitals - Patient Reported  Weight (Patient Reported): 230 lb (104.3 kg)    Physical Exam  She is alert.  She is speaking clearly.  She does not sound short of breath    PHQ-9: 10  [place]-7: 9            Phone call duration: 18 minutes

## 2021-06-17 NOTE — PATIENT INSTRUCTIONS - HE
Patient Instructions by Cassy Valenzuela MD at 7/11/2019  1:40 PM     Author: Cassy Valenzuela MD Service: -- Author Type: Physician    Filed: 7/11/2019  2:20 PM Encounter Date: 7/11/2019 Status: Signed    : Cassy Valenzuela MD (Physician)         Patient Education     Diet and Lifestyle Tips for Irritable Bowel Syndrome (IBS)    Your healthcare professional may suggest some lifestyle changes to help control your IBS. Changing your diet and managing stress are two of the most important. Follow your healthcare providers instructions and try some of the suggestions below.  Change your diet  Your diet may be an important cause of IBS symptoms. You may want to try the following:    Pay attention to what foods bother you, and avoid them. For example, dairy products are hard for some people to digest.    Drink 6 to 8 glasses of water a day.    Avoid caffeine and tobacco. These are muscle stimulants and can affect the working of your digestive tract.    Avoid alcohol, which can irritate your digestive tract and make your symptoms worse.    Eat more fiber if constipation is a problem. Fiber makes the stool softer and easier to pass through the colon.  Reduce stress  If stress or anxiety makes your IBS symptoms worse, learning how to manage stress may help you feel better. Try these tips:    Identify the causes of stress in your life.    Learn new ways to cope with them.    Regular exercise is a great way to relieve stress. It can also help ease constipation.  Date Last Reviewed: 7/1/2016 2000-2017 [employer]. [address] [place], [place] [id number]. All rights reserved. This information is not intended as a substitute for professional medical care. Always follow your healthcare professional's instructions.           Patient Education     What is Irritable Bowel Syndrome (IBS)?     Muscle contraction moves food through the digestive tract.      People who have irritable bowel syndrome (IBS) have digestive  "tracts that react abnormally to certain substances or to stress. This leads to symptoms like cramps, gas, bloating, pain, constipation, and diarrhea. Sometimes called spastic colon, IBS is a common condition that is not a disease, but rather a group of symptoms that happen together.  IBS--a motility problem  The muscle movement that passes food through the digestive tract is called motility. When you have IBS, the normal motility of the digestive tract (especially the colon) is disrupted. Motility may speed up, slow down, or become irregular. If stool passes too quickly through the colon, not enough water is absorbed from it. Loose, watery stools (diarrhea) can result. If stool passes through the colon too slowly, too much water is absorbed and the stool becomes hard and dry (constipation). Also, stool and gas may back up and cause painful pressure and cramping. There is no single test that can diagnose IBS. It is a group of symptoms that help your healthcare provider with the diagnosis. Often multiple blood, stool, radiologic tests, or even colonoscopy are performed in the evaluation of people suspected to have IBS. These are done primarily to make sure that there are no other illnesses that can account for your symptoms.   What causes IBS?  A great deal of research has been done on IBS, but the cause is still not known. Some of the possible factors include:     Smoking, eating certain foods, or drinking alcohol or caffeinated drinks can cause, or \"trigger,\" symptoms of IBS.    Although no one knows for sure, IBS may be caused by a problem with the nerves or muscles in your digestive tract.    There is also some evidence that certain bacteria found after a severe gastroinstestinal infection in the small intestine and colon may cause IBS.    While stress and anxiety worsen the symptoms of IBS, it is not believed to be the cause.   What you can do  Recommendations include:    Certain medicines may help regulate the " working of your digestive tract. Your healthcare provider may prescribe one or more for you.    Medicine cant cure IBS, but it may help manage the symptoms.    Because some medicines may make IBS worse, dont take any medicine, especially laxatives, unless your healthcare provider prescribes it for you.    Your healthcare provider may suggest some lifestyle changes to help control your IBS. Two of the most important are changing your diet and managing stress. If diet changes are suggested, ask for nutritional guidance from a dietitian so you maintain a healthy nutritional balance in your food intake.   Date Last Reviewed: 7/1/2016 2000-2017 [employer] [address] [place] [id number]. All rights reserved. This information is not intended as a substitute for professional medical care. Always follow your healthcare professional's instructions.           Patient Education     Irritable Bowel Syndrome    Irritable bowel syndrome (IBS) is a disorder of the intestines. It is not a disease, but a group of symptoms caused by changes in the way the intestines work. It is fairly common, but the cause is not well understood.  Symptoms of IBS include:    Abdominal pain, discomfort, and cramping    Diarrhea    Constipation or dry, hard stools    Mucous stool    Bloating    Feeling of incomplete bowel movements  It usually results in one of 3 patterns of symptoms:    Chronic abdominal pain and constipation    Recurring episodes of diarrhea, with or without pain    Alternating diarrhea and constipation  Home care  The goal of treatment is to control and relieve your symptoms, so you can lead a full and active life. There is no cure for IBS. But it can be managed.  Diet  Your diet did not cause your IBS, but it can affect it. No one diet works for everyone. Finding the best foods for you may take trial and error. Keep a food log to help find what foods made your symptoms worse. Below are some tips that  may help you.    Eat more slowly. Eat smaller amounts at a time, but more often. Remember, you can always eat more, but cannot eat less once youve eaten too much.    High-fiber foods are complicated. While they may help relieve constipation, they can make your bloating, cramping, gas, and diarrhea worse.    Eat less sugar.    Try cutting out dairy products. Sometimes this helps.    Try cutting out foods that are high in fat and fatty meats.    You can control bloating or passing excess gas. Be careful with gassy vegetables and fruits like beans, cabbage, broccoli, and cauliflower.    Be careful with carbonated beverages and fruit juices. They can make your bloating and diarrhea worse.    Caffeine, alcohol, and stimulants can make symptoms worse. These include coffee, tea, sodas, energy drinks, and chocolate.  Lifestyle    Look for factors that seem to worsen your symptoms. These include stress and emotions.     Although stress does not cause IBS, it may trigger flare-ups. Counseling can help you learn to handle stress. So can self-help measures like exercise, yoga, and meditation.    Depression can occur along with IBS. Your healthcare provider may prescribe antidepressant medicine. This may help with diarrhea, constipation, and cramping, as well as with symptoms of depression.    Smoking doesn't cause IBS, but can make the symptoms worse.  Medicines  Your healthcare provider may prescribe medicines. Take them as directed. For acute flare-ups of your illness, your provider may give you prescription medicines.    Check with your healthcare provider before taking any medicines for diarrhea.    Avoid anti-inflammatory medicines like ibuprofen or naproxen.    Consider nutritional supplements. This is especially true if your diarrhea is prolonged, or you aren't eating or are losing weight  Follow-up care  Follow up with your healthcare provider, or as advised. If a stool sample was taken or cultures were done, you will  be told if your treatment needs to change. You can call as directed for the results.  When to seek medical advice  Call your healthcare provider right away if any of these occur:    Abdominal pain gets worse    Constant abdominal pain moves to the right-lower abdomen    You can't keep liquids down because of vomiting    You have severe diarrhea    You have blood (red or black color) or mucus in your stool    You feel very weak or dizzy, faint, or have extreme thirst    You have a fever of 100.4 F (38.0 C) or higher, or as directed by your healthcare provider  Date Last Reviewed: 8/31/2015 2000-2017 [employer]. [address] [place], [place] [id number]. All rights reserved. This information is not intended as a substitute for professional medical care. Always follow your healthcare professional's instructions.           Patient Education   The FODMAP Diet      What is FODMAP?    FODMAP stands for Fermenatable Oligo-, Di-, and Mono-saccharides, And Polyols. The acronym is used  to describe a specific group of carbohydrates that are poorly absorbed in the small intestine and often  cause symptoms such as excess gas, bloating, and diarrhea in certain individuals with Irritable Bowel  Syndrome (IBS) and other functional bowel disorders.    What carbohydrate foods are FODMAPs?    FODMAP carbohyrdates include:      Certain sugars (lactose and fructose) found in foods such as milk and dairy products, fruit,    honey, and high-fructose corn syrup.    Artificial sweeteners (polyols), especially sweeteners containing sorbitol and mannitol.    Certain types of fiber (fructans and galactans) found in wheat, beans, and some vegetables.    FODMAP tolerance:        All FODMAPs have the potential to cause unwanted symptoms.  Yet, the degree in which FODMAPs are tolerated varies from person  to person. An individuals physical response to certain FODMAPs  depends on their own personal level of sensitivity. Therefore,  some  FODMAP groups may trigger symptoms while others might not.    Think of the small intestine as a bucket. Each person has their own  size bucket, or unique tolerance for FODMAP carbohydrates.  FODMAPs have a cumulative effect. That is, the amount, not just the  type of FODMAP consumed matters. When FODMAP intake exceeds  the amount the bucket can hold (the small intestines capacity for  digestion and absorption), it overflows into the large intestine.  This may lead to gas, bloating, and diarrhea.    The FODMAP approach    It is important to understand that IBS and other functional bowel disorders are not caused by eating  FODMAPs, therefore eliminating FODMAPs from the diet will not cure the disease. However, removing  certain FODMAPs from the diet may greatly improve symptoms.    The FODMAP approach takes into consideration tolerance to all FODMAP carbohyrate groups, not just  specific foods. Often times in an attempt to ease symptoms, individuals tend to be overly restrictive  with their diet. This leads to nutritional deficiencies. The goal of the FODMAP approach is to manage  symptoms while allowing for the most varied and nutritious diet possible.  Adapted from IBS - Free at Last! Second Edition by Sully Kent, MS, RD, LD, available at www.[email or web address] 4/2014    Low FODMAP diet:    A low FODMAP diet aims to minimize gastrointestinal symptoms by removing common high FODMAP  foods and replacing them with low FODMAP alternatives. It is intended to be a short-term diet and is  usually followed for six weeks or less. It is not to be used as permanent diet solution. After symptoms  improve, high FODMAP foods are gradually added back into the diet in smaller amounts.    FODMAP Elimination diet:    A more aggressive approach is a FODMAP elimination diet. It should not be attempted without the help  and supervision of either a Registered Dietitian or healthcare provider. An elimination diet is a  temporary learning  diet that is used to identify troublesome FODMAPs. It consists of three phases:      Elimination phase: all FODMAPs are eliminated from the diet for approximatley 3 weeks.    Challenge phase: the body is challenged by reintroducing FODMAPs into the diet in an organized way. Symptoms are observed and problamatic FODMAPs are identified.    Final phase: problem FODMAPs are incorporated back into the diet as tolerated.    Rather than excluding all FODMAPs from the diet, the goal of both diet methods is to eliminate only the  FODMAPs that are problematic. With proper management, few, if any foods must be removed from the  diet permanently.    Is a low FODMAP diet right for you?    A low FODMAP diet may not be appropriate for everyone. Before beginning a low FODMAP diet, first  consult your healthcare provider to eliminate other causes of your symptoms. Low FODMAP diets have shown to be most successful for persons who have:      An official diagnosis of Irritable Bowel Syndrome (IBS), Inflammatory Bowel Disease (IBD), or other functional bowel disorder with symptoms of excess gas, bloating, abdominal pain,diarrhea/constipation, etc.    Tried and failed standard therapy (high-fiber diet, increased fluid intake, increased exercise, etc).    Ruled out celiac disease as a possible diagnosis. This is important as restricting wheat from the diet will affect the accuracy of future celiac testing.    Regular or irregular intake of high FODMAP foods.    The desire and abiltiy to modify their diet.    **Additional testing such as lactose and/or fructose breath tests is helpful (but not essential) prior to beginning a FODMAP diet.    Enlist the help of a Registered Dietitian:    If it is determined that a low FODMAP diet would be beneficial to you, consulting a Registered Dietitian(RD) who specializes in gastrointestinal nutrition can increase the likelihood of your success. The RD will help to identify major FODMAP culprits in your  diet and develop an individualized diet plan centered  around your eating habits and food preferences to improve both symptoms and quality of life.                            Try these low-FODMAP foods      Avoid these foods containing FODMAPs        Adapted from IBS - Free at Last! Second Edition by Sully Kent MS, RD, LD, available at www.[email or web address]

## 2021-06-17 NOTE — TELEPHONE ENCOUNTER
"Caller  reporting  She woke with  Head ache at 0500; felt different than usual migraine in that she feels light headed and has some vertigo when she sits up and moves \" feels like a head rush\"    Went to chiropractor and had some relief of neck stiffness after that but headdache persists, has not taken any analgesic at all until just recently when she took  400 mg ibuprofen   Caller is advised in home care for headache;  Advised to be seen if symptoms do not lessen after  analgesic and rest or if headache  persists into tomorrow or does not go away completely after  72 hrs or she develops new or or worsening symptoms   Caller understands and will comply   Eliana Dobson RN  FNA      Caller understands and will comply   Eliana Dobson RN  FNA     Reason for Disposition    [1] Headache AND [2] has not taken pain medications    Additional Information    Negative: Difficult to awaken or acting confused (e.g., disoriented, slurred speech)    Negative: [1] Weakness of the face, arm or leg on one side of the body AND [2] new onset    Negative: [1] Numbness of the face, arm or leg on one side of the body AND [2] new onset    Negative: [1] Loss of speech or garbled speech AND [2] new onset    Negative: Passed out (i.e., lost consciousness, collapsed and was not responding)    Negative: Sounds like a life-threatening emergency to the triager    Negative: Followed a head injury    Negative: Pregnant    Negative: Postpartum (from 0 to 6 weeks after delivery)    Negative: Traumatic Brain Injury (TBI) is suspected    Negative: Unable to walk, or can only walk with assistance (e.g., requires support)    Negative: Stiff neck (can't touch chin to chest)    Negative: Severe pain in one eye    Negative: [1] Other family members (or roommates) with headaches AND [2] possibility of carbon monoxide exposure    Negative: [1] SEVERE headache (e.g., excruciating) AND [2] \"worst headache\" of life    Negative: [1] SEVERE headache AND [2] " sudden-onset (i.e., reaching maximum intensity within seconds)    Negative: [1] SEVERE headache AND [2] fever    Negative: Loss of vision or double vision (Exception: same as prior migraines)    Negative: [1] Fever > 100.0 F (37.8 C) AND [2] diabetes mellitus or weak immune system (e.g., HIV positive, cancer chemo, splenectomy, organ transplant, chronic steroids)    Negative: Patient sounds very sick or weak to the triager    Negative: [1] SEVERE headache (e.g., excruciating) AND [2] not improved after 2 hours of pain medicine    Negative: [1] Vomiting AND [2] 2 or more times (Exception: similar to previous migraines)    Negative: Fever > 104 F (40 C)    Negative: [1] MODERATE headache (e.g., interferes with normal activities) AND [2] present > 24 hours AND [3] unexplained  (Exceptions: analgesics not tried, typical migraine, or headache part of viral illness)    Negative: [1] New headache AND [2] weak immune system (e.g., HIV positive, cancer chemo, splenectomy, organ transplant, chronic steroids)    Negative: [1] New headache AND [2] age > 50    Negative: [1] Sinus pain of forehead AND [2] yellow or green nasal discharge    Negative: Fever present > 3 days (72 hours)    Negative: [1] MILD-MODERATE headache AND [2] present > 72 hours    Negative: Headache started during sex    Protocols used: HEADACHE-A-AH

## 2021-06-18 NOTE — PATIENT INSTRUCTIONS - HE
"Patient Instructions by Rubio Delaney MD at 7/6/2020  2:30 PM     Author: Rubio Delaney MD Service: -- Author Type: Physician    Filed: 7/6/2020  4:18 PM Encounter Date: 7/6/2020 Status: Addendum    : Rubio Delaney MD (Physician)    Related Notes: Original Note by Rubio Delaney MD (Physician) filed at 7/6/2020  4:18 PM       -Your head CT is normal / negative.  -Have referred you to the [facility] for evaluation / treatment of your concussion. You should receive a call from the [facility] within 1-2 business days. Please call [facility] at [phone] to check the status of this referral if you have not received any information regarding this appointment within 2 business days.   -Continue taking acetaminophen as needed for headache.  -Take cyclobenzaprine as needed for neck tension.  -Apply heat to your neck for 20 minutes at least four times a day to relieve tension.  -Work excuse provided for the remainder of this week. Return to work recommendations will be made by the [facility].  Patient Education     Concussion    A concussion can be caused by a direct blow to the head, neck, face, or somewhere else on the body with the force being transmitted to the head. This may cause you to lose consciousness - be \"knocked out\" - but not always. Depending on the severity of the blow, it will take from a few hours up to a few days to get better. Sometimes symptoms may last a few months or longer. This is called post-concussion syndrome.  At first, you may have a headache, nausea, vomiting, or dizziness. You may also have problems concentrating or remembering things. This is normal.  Symptoms should get better as the hours and days go by. Symptoms that get worse could be a sign of a more serious injury. This might be a bruise or bleeding in the brain. Thats why its important to watch for the warning signs listed below.  Home care  If your injury is mild and there " are no serious signs or symptoms, your healthcare provider may recommend that you be monitored at home. If there is evidence that the injury is more serious, you will be monitored in the hospital. Follow these tips to help care for yourself at home:    After a concussion, your healthcare provider may recommend that a family member or friend monitor you for 12 to 24 hours. They may be told to wake you every few hours during sleep to check for the signs below.    If your face or scalp swells, apply an ice pack for 20 minutes every 1 to 2 hours. Do this until the swelling starts to go down. You can make an ice pack by putting ice cubes in a plastic bag and wrapping the bag in a towel.    You may use acetaminophen to control pain, unless another pain medicine was prescribed. Do not use aspirin or ibuprofen after a head injury. If you have chronic liver or kidney disease, talk with your doctor before using these medicines. Also talk with your doctor if you ever had a stomach ulcer or gastrointestinal bleeding.    For the next 24 hours:  ? Dont drink alcohol or take sedatives or medicines that make you sleepy.  ? Dont drive or operate machinery.  ? Avoid doing anything strenuous. Dont lift or strain.    Dont return to sports or any activity that could cause you to hit your head until all symptoms are gone and you have been cleared by your doctor. A second head injury before fully recovering from the first one can lead to serious brain injury.    Avoid doing activities that require a lot of concentration or a lot of attention. This will allow your brain to rest and heal quicker.  Follow-up care  Follow up with your doctor in 1 week, or as directed.  Note: A radiologist will review any X-rays or CT scans that were taken. You will be told of any new findings that may affect your care.  When to seek medical advice  Call your healthcare provider right away if any of these occur:    Repeated vomiting    Headache or dizziness  that is severe or gets worse    Loss of consciousness    Unusual drowsiness, or unable to wake up as usual    Weakness or decreased ability to walk or move any limb    Confusion, agitation, or change in behavior or speech, or memory loss    Blurred vision    Convulsion (seizure)    Swelling on the scalp or face that gets worse    Changes in pupil size (the black part of the eye)    Redness, warmth, or pus from the swollen area    Fluid draining from or bleeding from the nose or ears     Date Last Reviewed: 8/14/2015 2000-2017 [employer]. [address] [place] [address]. All rights reserved. This information is not intended as a substitute for professional medical care. Always follow your healthcare professional's instructions.

## 2021-06-18 NOTE — TELEPHONE ENCOUNTER
RN cannot approve Refill Request    RN can NOT refill this medication med is not covered by policy/route to provider. Last office visit: 10/30/2019 Cassy Valenzuela MD Last Physical: 11/13/2019 Last MTM visit: Visit date not found Last visit same specialty: 7/1/2020 Aguilar Guerra CNP.  Next visit within 3 mo: Visit date not found  Next physical within 3 mo: Visit date not found      Tegan Kennedy, [facility] Triage/Med Refill 4/24/2021    Requested Prescriptions   Pending Prescriptions Disp Refills     fluocinonide (LIDEX) 0.05 % external solution [Pharmacy Med Name: Fluocinonide External Solution 0.05 %] 60 mL 0     Sig: Apply sparingly to scalp once or twice daily as needed.       There is no refill protocol information for this order        escitalopram oxalate (LEXAPRO) 10 MG tablet [Pharmacy Med Name: Escitalopram Oxalate Oral Tablet 10 MG] 90 tablet 0     Sig: Take 1/2 tablet by mouth daily for 1 week and then increase to 1 tablet by mouth daily       SSRI Refill Protocol  Passed - 4/24/2021 10:37 AM        Passed - PCP or prescribing provider visit in last year     Last office visit with prescriber/PCP: 10/30/2019 Cassy Valenzuela MD OR same dept: Visit date not found OR same specialty: 7/1/2020 Aguilar Guerra CNP  Last physical: 11/13/2019 Last MTM visit: Visit date not found   Next visit within 3 mo: Visit date not found  Next physical within 3 mo: Visit date not found  Prescriber OR PCP: Cassy Valenzuela MD  Last diagnosis associated with med order: 1. Seborrheic dermatitis of scalp  - fluocinonide (LIDEX) 0.05 % external solution [Pharmacy Med Name: Fluocinonide External Solution 0.05 %]; Apply sparingly to scalp once or twice daily as needed.  Dispense: 60 mL; Refill: 0    2. Mild major depression (H)  - escitalopram oxalate (LEXAPRO) 10 MG tablet [Pharmacy Med Name: Escitalopram Oxalate Oral Tablet 10 MG]; Take 1/2 tablet by mouth daily for 1 week and then increase to 1 tablet by mouth daily   Records received.   Colonoscopy done 03/02/2015 Dispense: 90 tablet; Refill: 0    If protocol passes may refill for 12 months if within 3 months of last provider visit (or a total of 15 months).

## 2021-06-19 NOTE — LETTER
Letter by Cassy Valenzuela MD at      Author: Cassy Valenzuela MD Service: -- Author Type: --    Filed:  Encounter Date: 9/27/2019 Status: Signed         Radha Dickerson  [facility] [address]      September 27, 2019      Dear Radha    In reviewing your records, we have determined a gap in your preventive services. Based on your age and health history, we recommend the follow service.       Please call to schedule the overdue services below with DR Valenzuela    ? Physical with a Pap Smear  ? mmunization  ? Lab work  ? Med check      If you have had the service elsewhere, please contact us so we can update our records. Please let us know if you have transferred your care to another clinic.    Please call [phone] to schedule this appointment.    We believe that a strong preventive care program, including regular physicals and follow-up care is an important part of a healthy lifestyle and we are committed to helping you maintain your health.    Thank you for choosing us as your health care provider.    Sincerely,     DR Valenzuela and Loida     [facility]

## 2021-06-19 NOTE — LETTER
Letter by Cassy Valenzuela MD at      Author: Cassy Valenzuela MD Service: -- Author Type: --    Filed:  Encounter Date: 11/15/2019 Status: Signed         Radha Dickerson  [address] [place] [address]             November 15, 2019         Dear MsLy Dickerson,    Below are the results from your recent visit:    Resulted Orders   HIV Antigen/Antibody Screening Cascade   Result Value Ref Range    HIV Antigen / Antibody Negative Negative    Narrative    Method is Abbott HIV Ag/Ab for the detection of HIV p24 antigen, HIV-1 antibodies and HIV-2 antibodies.   Lipid Cascade   Result Value Ref Range    Cholesterol 194 <=199 mg/dL    Triglycerides 106 <=149 mg/dL    HDL Cholesterol 53 >=50 mg/dL    LDL Calculated 120 <=129 mg/dL    Patient Fasting > 8hrs? No    Glucose   Result Value Ref Range    Glucose 75 70 - 125 mg/dL    Patient Fasting > 8hrs? Yes     Narrative    Fasting Glucose reference range is 70-99 mg/dL per  American Diabetes Association (ADA) guidelines.   Gynecologic Cytology (PAP Smear)   Result Value Ref Range    Case Report       Gynecologic Cytology Report                       Case: [id number]                                   Authorizing Provider:  Cassy Valenzuela MD          Collected:           11/13/2019 1630              Ordering Location:     [facility]/OB Received:            11/13/2019 1634              First Screen:          Rashel Nevarez CT                                                                           (ASCP)                                                                       Specimen:    SUREPATH PAP, SCREENING, Endocervical/cervical                                             Interpretation  Negative for squamous intraepithelial lesion or malignancy.      Negative for squamous intraepithelial lesion or malignancy    Result Flag Normal Normal    Specimen Adequacy       Satisfactory for evaluation, endocervical/transformation zone component present    HPV  Reflex? Yes if ASCUS     HIGH RISK No     LMP/Menopause Date 11/4/2019     Abnormal Bleeding No     Pt Status n/a     Birth Control/Hormones None[condoms     Previous Normal/Date date unknown     Prev Abn Date/Dx none     Cervical Appearance NL    Hemoglobin   Result Value Ref Range    Hemoglobin 13.8 12.0 - 16.0 g/dL   Glycosylated Hemoglobin A1c   Result Value Ref Range    Hemoglobin A1c 5.4 3.5 - 6.0 %       Lab results look good. Pap smear is normal.  Blood sugars and cholesterol levels are normal.     Please call with questions or contact us using [employer].    Sincerely,        Electronically signed by Cassy Vlaenzuela MD

## 2021-06-19 NOTE — LETTER
Letter by Cassy Valenzuela MD at      Author: Cassy Valenzuela MD Service: -- Author Type: --    Filed:  Encounter Date: 11/27/2019 Status: Signed        [facility]/OB  [address] [address]  [phone]         Radha Dickerson  [address] [facility] [address]  [place] [address]        11/27/19    Dear Radha Dickerson,     At [facility] we care about your health and well-being. Your primary care provider is committed to ensuring you receive high quality care and has chosen a network of specialists to assist in providing that care. Recently Dr. Cassy Valenzuela referred you to [facility] for specialty care.      Please call [facility] ([phone]) at your earliest convenience for assistance in scheduling an appointment.  If you have already scheduled this appointment, please disregard this notice.  Thank you for choosing [facility] for your healthcare needs.       Sincerely,     Dr. Cassy Valenzuela /   [facility] Specialty Scheduling

## 2021-06-20 NOTE — LETTER
Letter by Aguilar Guerra CNP at      Author: Aguilar Guerra CNP Service: -- Author Type: --    Filed:  Encounter Date: 7/1/2020 Status: (Other)         July 1, 2020     Patient: Radha Dickerson   YOB: 1990   Date of Visit: 7/1/2020       To Whom It May Concern:    It is my medical opinion that Radha Dickerson should remain out of work until 7/8/2020.    If you have any questions or concerns, please don't hesitate to call.    Sincerely,        Electronically signed by Aguilar Guerra CNP

## 2021-06-20 NOTE — LETTER
Letter by Judith Friend FNP at      Author: Judiht Friend FNP Service: -- Author Type: --    Filed:  Date of Service:  Status: (Other)         July 22, 2020     Patient: Radha Dickerson   YOB: 1990   Date of Visit: 7/22/2020     To Whom It May Concern:    It is my medical opinion that Radha Dickerson may return to work on 7/22/2020. Employees recovering from concussions often exhibit cognitive symptoms that make attending work and learning difficult. They may not be able to attend work or only partial days. Some symptoms that could affect performance in the work environment  include: sensitivity to light and noise, headache, trouble focusing, concentrating, or remembering, and difficulty looking at a screen. The restrictions below often help reduce the symptoms and allow them to return to work quicker. Compliance with these restrictions allows the brain to recover more quickly even if it appears the employee is symptom free.    Attendance Restrictions  Full days as tolerated (tolerated is no return of severe symptoms). Patient will return to clinic and I will reassess for return to work. Restrictions should stay in play until her next visit with me.  Other Restrictions:  1. Time off for medical appointments  2. Allow patient to take 10-15 minute breaks every 60 minutes   3. Allow patient to take a break if she starts to have symptoms, If symptoms continue or worsen please allow patient to return home.  4. Allow employee to wear sunglasses and hat to help patient's sensitivity to lights. Remove any overhead lighting and replace with lamps if possible. Move patient to a office if available.  5. Please put a screen filter on the patient's computer   6  If patient wakes with severe headache please allow patient to start work later, it symptoms worsen patient should not attempt to work.  7. Patient should work with any aggressive children (any child where the patient is at risk for another head  "injury).   8. Patient is allowed to work with children for a max of 6 hours a day, the remainder part of her day should be spent doing \"light duty/office work\". If patient has symptoms which continue she is allowed to move to move to doing light duty/office work.    If you have any questions or concerns, please don't hesitate to call.    Sincerely,        Electronically signed by BALDO Suazo       "

## 2021-06-20 NOTE — LETTER
Letter by Cassy Valenzuela MD at      Author: Cassy Valenzuela MD Service: -- Author Type: --    Filed:  Encounter Date: 7/31/2020 Status: (Other)         July 31, 2020     Patient: Radha Dickerson   YOB: 1990   Date of Visit: 7/31/2020       To Whom It May Concern:    It is my medical opinion that Radha Dickerson should not work for more than 4 hours per day due to worsening of concussion symptoms when working for longer hours.  She will follow up with her specialist at the concussion clinic for more specific work restrictions.       If you have any questions or concerns, please don't hesitate to call.    Sincerely,        Electronically signed by Cassy Valenzuela MD

## 2021-06-20 NOTE — LETTER
Letter by Judith Friend FNP at      Author: Judith Friend FNP Service: -- Author Type: --    Filed:  Encounter Date: 7/31/2020 Status: (Other)         July 31, 2020     Patient: Radha Dickerson   YOB: 1990   Date of Visit: 7/31/2020       To Whom It May Concern:    It is my medical opinion that Radha Dickerson may return to work on 7/23/2020.  Employees recovering from concussions often exhibit cognitive symptoms that make attending work and learning difficult. They may not be able to attend work or only partial days. Some symptoms that could affect performance in the work environment  include: sensitivity to light and noise, headache, trouble focusing, concentrating, or remembering, and difficulty looking at a screen. The restrictions below often help reduce the symptoms and allow them to return to work quicker. Compliance with these restrictions allows the brain to recover more quickly even if it appears the employee is symptom free.    Attendance Restrictions  Patient will work half days as tolerated (tolerated is no return of severe symptoms). Patient will return to clinic and I will reassess for return to work. Restrictions should stay in play until her next visit with me.  Other Restrictions:  1. Time off for medical appointments  2. Allow patient to take 10-15 minute breaks every 2 hours   3. Allow patient to take a break if she starts to have symptoms, If symptoms continue or worsen please allow patient to return home.  4. Allow employee to wear sunglasses and hat to help patient's sensitivity to lights. Remove any overhead lighting and replace with lamps if possible. Move patient to a office if available.  5. Please put a screen filter on the patient's computer   6  If patient wakes with severe headache please allow patient to start work later, it symptoms worsen patient should not attempt to work.  7. Patient should not work with any aggressive children (any child where the patient is  "at risk for another head injury).   8. Patient is allowed to work with children for a max of 6 hours a day, the remainder part of her day should be spent doing \"light duty/office work\". If patient has symptoms which continue she is allowed to move to move to doing light duty/office work.      If you have any questions or concerns, please don't hesitate to call.    Sincerely,        Electronically signed by BALDO Suazo       "

## 2021-06-20 NOTE — LETTER
Letter by Rubio Delaney MD at      Author: Rubio Delaney MD Service: -- Author Type: --    Filed:  Encounter Date: 7/6/2020 Status: (Other)         July 6, 2020     Patient: Radha Dickerson   YOB: 1990   Date of Visit: 7/6/2020       To Whom it May Concern:    Radha Dickerson was seen in my clinic on 7/6/2020.  Please excuse her absence from work on Thursday 7/9/2020 and Friday 7/10/2020 due to injury.      If you have any questions or concerns, please don't hesitate to call.    Sincerely,         Electronically signed by Rubio Delaney MD

## 2021-06-20 NOTE — LETTER
Letter by Cassy Valenzuela MD at      Author: Cassy Valenzuela MD Service: -- Author Type: --    Filed:  Encounter Date: 7/10/2020 Status: (Other)         July 10, 2020     Patient: Radha Dickerson   YOB: 1990   Date of Visit: 7/10/2020       To Whom It May Concern:    It is my medical opinion that Radha Dickerson may return to work on 7/13/2020. 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  It is okay for Radha to perform regular work activities.  Please allow Radha to take more frequent breaks if needed to manage symptoms of headaches or dizziness that may occur as result of the concussion.     If you have any questions or concerns, please don't hesitate to call.    Sincerely,        Electronically signed by Cassy Valenzuela MD

## 2021-06-20 NOTE — LETTER
Letter by Judith Friend FNP at      Author: Judith Friend FNP Service: -- Author Type: --    Filed:  Date of Service:  Status: (Other)         October 13, 2020   Patient: Radha Dickerson   YOB: 1990   Date of Visit: 10/13/2020     To Whom It May Concern:    It is my medical opinion that Radha Dickerson may return to work on 10/13/2020. Employees recovering from concussions often exhibit cognitive symptoms that make attending work and learning difficult. They may not be able to attend work or only partial days. Some symptoms that could affect performance in the work environment  include: sensitivity to light and noise, headache, trouble focusing, concentrating, or remembering, and difficulty looking at a screen. The restrictions below often help reduce the symptoms and allow them to return to work quicker. Compliance with these restrictions allows the brain to recover more quickly even if it appears the employee is symptom free.  Attendance Restrictions  Patient will continue to work five days a week if tolerated (tolerated is no return of severe symptoms), She will continue with current hours this week. Starting 10/19/2020, she will work 6 hours on Monday, Wednesday and Friday, then work 4 hours on Tuesday and Thursday for one week. Then 10/26/2020 she will increase to 7 hours on Monday, Wednesday and Friday, then work 4 hours on Tuesday and Thursday for one week. 11/2/2020 she will work 8 hours a day Monday, Wednesday and Friday, then work 4 hours on Tuesday and Thursday for one week. 11/9/2020 she will work 8 hours a day Monday, Wednesday and Friday, then increase to 5 hours on Tuesday and Thursday for one week. She will then return to clinic and I will reassess for return to work.   Other Restrictions:  1. Time off for medical appointments  2. Allow patient to take 10-15 minute breaks every 2 hours   3. Allow patient to take a break if she starts to have symptoms, If symptoms worsen please  "allow patient to return home.  4. Allow employee to wear sunglasses and hat to help patient's sensitivity to lights. Remove any overhead lighting and replace with lamps if possible. Move patient to a office if available.  5. Please put a screen filter on the patient's computer   6  If patient wakes with severe headache please allow patient to start work later, it symptoms worsen patient should not attempt to work.  7. Patient should not work with any aggressive children (any child where the patient is at risk for another head injury).   8. Patient is allowed to work with children for a max of 6 hours a day, the remainder part of her day should be spent doing \"light duty/office work\". If patient has symptoms which continue she is allowed to move to move to doing light duty/office work.    If you have any questions or concerns, please don't hesitate to call.    Sincerely,    Electronically signed by BALDO Suazo       "

## 2021-06-20 NOTE — LETTER
Letter by Judith Friend FNP at      Author: Judith Friend FNP Service: -- Author Type: --    Filed:  Date of Service:  Status: (Other)         September 4, 2020     Patient: Radha Dickerson   YOB: 1990   Date of Visit: 9/4/2020     To Whom It May Concern:    It is my medical opinion that Radha Dickerson may return to work on 9/4/2020. Employees recovering from concussions often exhibit cognitive symptoms that make attending work and learning difficult. They may not be able to attend work or only partial days. Some symptoms that could affect performance in the work environment  include: sensitivity to light and noise, headache, trouble focusing, concentrating, or remembering, and difficulty looking at a screen. The restrictions below often help reduce the symptoms and allow them to return to work quicker. Compliance with these restrictions allows the brain to recover more quickly even if it appears the employee is symptom free.  Attendance Restrictions  Patient will work half days as tolerated (tolerated is no return of severe symptoms) for two weeks, if no return of severe symptoms in those two weeks the patient can increase her hours a day worked on Tuesday and Thursday to 6 hour a day. Patient will return to clinic in 4 weeks and I will reassess for return to work. Restrictions should stay in play until her next visit with me.  Other Restrictions:  1. Time off for medical appointments  2. Allow patient to take 10-15 minute breaks every 2 hours   3. Allow patient to take a break if she starts to have symptoms, If symptoms continue or worsen please allow patient to return home.  4. Allow employee to wear sunglasses and hat to help patient's sensitivity to lights. Remove any overhead lighting and replace with lamps if possible. Move patient to a office if available.  5. Please put a screen filter on the patient's computer   6  If patient wakes with severe headache please allow patient to start  "work later, it symptoms worsen patient should not attempt to work.  7. Patient should not work with any aggressive children (any child where the patient is at risk for another head injury).   8. Patient is allowed to work with children for a max of 6 hours a day, the remainder part of her day should be spent doing \"light duty/office work\". If patient has symptoms which continue she is allowed to move to move to doing light duty/office work.    If you have any questions or concerns, please don't hesitate to call.    Sincerely,        Electronically signed by BALDO Suazo       "

## 2021-06-20 NOTE — LETTER
Letter by Judith Friend FNP at      Author: Judith Friend FNP Service: -- Author Type: --    Filed:  Encounter Date: 7/23/2020 Status: (Other)         July 23, 2020     Patient: Radha Dickerson   YOB: 1990   Date of Visit: 7/22/2020       To Whom It May Concern:      It is my medical opinion that Radha Dickerson may return to work on 7/22/2020. Employees recovering from concussions often exhibit cognitive symptoms that make attending work and learning difficult. They may not be able to attend work or only partial days. Some symptoms that could affect performance in the work environment  include: sensitivity to light and noise, headache, trouble focusing, concentrating, or remembering, and difficulty looking at a screen. The restrictions below often help reduce the symptoms and allow them to return to work quicker. Compliance with these restrictions allows the brain to recover more quickly even if it appears the employee is symptom free.    Attendance Restrictions  Full days as tolerated (tolerated is no return of severe symptoms). Patient will return to clinic and I will reassess for return to work. Restrictions should stay in play until her next visit with me.  Other Restrictions:  1. Time off for medical appointments  2. Allow patient to take 10-15 minute breaks every 2 hours   3. Allow patient to take a break if she starts to have symptoms, If symptoms continue or worsen please allow patient to return home.  4. Allow employee to wear sunglasses and hat to help patient's sensitivity to lights. Remove any overhead lighting and replace with lamps if possible. Move patient to a office if available.  5. Please put a screen filter on the patient's computer   6  If patient wakes with severe headache please allow patient to start work later, it symptoms worsen patient should not attempt to work.  7. Patient should not work with any aggressive children (any child where the patient is at risk for  "another head injury).   8. Patient is allowed to work with children for a max of 6 hours a day, the remainder part of her day should be spent doing \"light duty/office work\". If patient has symptoms which continue she is allowed to move to move to doing light duty/office work.      If you have any questions or concerns, please don't hesitate to call.    Sincerely,        Electronically signed by BALDO Suazo       "

## 2021-06-21 NOTE — LETTER
Letter by Judith Friend FNP at      Author: Judith Friend FNP Service: -- Author Type: --    Filed:  Date of Service:  Status: (Other)         December 17, 2020     Patient: Radha Dickerson   YOB: 1990   Date of Visit: 12/17/2020       To Whom It May Concern:    It is my medical opinion that Radha Dickerson may return to work on 12/18/2020.  Employees recovering from concussions often exhibit cognitive symptoms that make attending work and learning difficult. They may not be able to attend work or only partial days. Some symptoms that could affect performance in the work environment  include: sensitivity to light and noise, headache, trouble focusing, concentrating, or remembering, and difficulty looking at a screen. The restrictions below often help reduce the symptoms and allow them to return to work quicker. Compliance with these restrictions allows the brain to recover more quickly even if it appears the employee is symptom free.  Attendance Restrictions  Full days as tolerated  Other Restrictions:  1. Time off for medical appointments  2. Allow patient to take 10-15 minute breaks every 2 hours   3. Allow patient to take a break if she starts to have symptoms, If symptoms worsen please allow patient to return home.  4. Allow employee to wear sunglasses and hat to help patient's sensitivity to lights. Remove any overhead lighting and replace with lamps if possible. Move patient to a office if available.  5. Please put a screen filter on the patient's computer   6  If patient wakes with severe headache please allow patient to start work later, it symptoms worsen patient should not attempt to work.      If you have any questions or concerns, please don't hesitate to call.    Sincerely,        Electronically signed by BALDO Suazo

## 2021-06-21 NOTE — LETTER
Letter by Judith Friend FNP at      Author: Judith Friend FNP Service: -- Author Type: --    Filed:  Date of Service:  Status: (Other)         November 19, 2020     Patient: Radha Dickerson   YOB: 1990   Date of Visit: 11/19/2020       To Whom It May Concern:    It is my medical opinion that Radha Dickerson may return to work on 11/19/2020.  Employees recovering from concussions often exhibit cognitive symptoms that make attending work and learning difficult. They may not be able to attend work or only partial days. Some symptoms that could affect performance in the work environment  include: sensitivity to light and noise, headache, trouble focusing, concentrating, or remembering, and difficulty looking at a screen. The restrictions below often help reduce the symptoms and allow them to return to work quicker. Compliance with these restrictions allows the brain to recover more quickly even if it appears the employee is symptom free.  Attendance Restrictions  Patient will continue to work five days a week if tolerated (tolerated is no return of severe symptoms), She will continue with current hours this week. She will also continue to work 8 hours on Monday, Wednesday and Friday, If she tolerated previous week she can increase Tuesday and Thursday by one hour the following Monday.  She can continue to increase by one hour every week if she continues to tolerate. She will then return to clinic in 4 weeks and I will reassess for return to work.   Other Restrictions:  1. Time off for medical appointments  2. Allow patient to take 10-15 minute breaks every 2 hours   3. Allow patient to take a break if she starts to have symptoms, If symptoms worsen please allow patient to return home.  4. Allow employee to wear sunglasses and hat to help patient's sensitivity to lights. Remove any overhead lighting and replace with lamps if possible. Move patient to a office if available.  5. Please put a screen  "filter on the patient's computer   6  If patient wakes with severe headache please allow patient to start work later, it symptoms worsen patient should not attempt to work.  7. Patient should not work with any aggressive children (any child where the patient is at risk for another head injury).  Patient is allowed to work with children for a max of 6 hours a day, the remainder part of her day should be sent doing \"light duty/office work\". If patient has symptoms which continue she is allowed to move to back to doing light duty/office work.    If you have any questions or concerns, please don't hesitate to call.    Sincerely,        Electronically signed by BALDO Suazo       "

## 2021-06-23 ENCOUNTER — OFFICE VISIT - HEALTHEAST (OUTPATIENT)
Dept: FAMILY MEDICINE | Facility: CLINIC | Age: 31
End: 2021-06-23

## 2021-06-23 DIAGNOSIS — R10.32 LLQ ABDOMINAL PAIN: ICD-10-CM

## 2021-06-23 DIAGNOSIS — N94.89 UTERINE PAIN: ICD-10-CM

## 2021-06-23 LAB
ALBUMIN UR-MCNC: NEGATIVE G/DL
APPEARANCE UR: CLEAR
BACTERIA #/AREA URNS HPF: ABNORMAL /[HPF]
BILIRUB UR QL STRIP: NEGATIVE
CLUE CELLS: NORMAL
COLOR UR AUTO: YELLOW
GLUCOSE UR STRIP-MCNC: NEGATIVE MG/DL
HCG UR QL: NEGATIVE
HGB UR QL STRIP: NEGATIVE
KETONES UR STRIP-MCNC: NEGATIVE MG/DL
LEUKOCYTE ESTERASE UR QL STRIP: NEGATIVE
NITRATE UR QL: NEGATIVE
PH UR STRIP: 6 [PH] (ref 5–8)
RBC #/AREA URNS AUTO: ABNORMAL HPF
SP GR UR STRIP: >=1.03 (ref 1–1.03)
SQUAMOUS #/AREA URNS AUTO: ABNORMAL LPF
TRICHOMONAS, WET PREP: NORMAL
UROBILINOGEN UR STRIP-ACNC: ABNORMAL
WBC #/AREA URNS AUTO: ABNORMAL HPF
YEAST, WET PREP: NORMAL

## 2021-06-25 NOTE — TELEPHONE ENCOUNTER
Refill Approved    Rx renewed per Medication Renewal Policy. Medication was last renewed on 6/1/18.    Clementina Crawford, [facility] Triage/Med Refill 3/18/2019     Requested Prescriptions   Pending Prescriptions Disp Refills     pantoprazole (PROTONIX) 40 MG tablet [Pharmacy Med Name: PANTOPRAZOLE 40MG TABLETS] 90 tablet 0     Sig: TAKE 1 TABLET BY MOUTH EVERY DAY    GI Medications Refill Protocol Passed - 3/15/2019  9:37 PM       Passed - PCP or prescribing provider visit in last 12 or next 3 months.    Last office visit with prescriber/PCP: Visit date not found OR same dept: Visit date not found OR same specialty: Visit date not found  Last physical: 4/6/2018 Last MTM visit: Visit date not found   Next visit within 3 mo: Visit date not found  Next physical within 3 mo: Visit date not found  Prescriber OR PCP: Cassy Valenzuela MD  Last diagnosis associated with med order: There are no diagnoses linked to this encounter.  If protocol passes may refill for 12 months if within 3 months of last provider visit (or a total of 15 months).

## 2021-06-25 NOTE — TELEPHONE ENCOUNTER
Patient did an E-visit yesterday.  The E-visit provider recommended that she be seen in urgent care for evaluation of cough, difficulty breathing and wheezing.  Please contact patient and let her know this recommendation to ensure that she receives the message.  Please let her know that I also agree with this recommendation

## 2021-06-26 NOTE — PATIENT INSTRUCTIONS - HE
Dear Radha Dickerson,    We are sorry you are not feeling well. Based on the responses you provided, it is recommended that you be seen in-person in urgent care so we can better evaluate your symptoms. Please click Here to find the nearest urgent care location to you. If you're shortness of breath is severe and urgent cares are not open please seek emergency medical attention.   You will not be charged for this Visit. Thank you for trusting us with your care.    Lulú Moreno PA-C

## 2021-07-04 NOTE — PROGRESS NOTES
Progress Notes by Clement Robert PA-C at 6/23/2021  6:20 PM     Author: Clement Robert PA-C Service: -- Author Type: Physician Assistant    Filed: 6/23/2021  8:00 PM Encounter Date: 6/23/2021 Status: Signed    : Clement Robert PA-C (Physician Assistant)         Assessment & Plan:       1. LLQ abdominal pain  Urinalysis-UC if Indicated    Pregnancy (Beta-hCG, Qual), Urine   2. Uterine pain  Pregnancy (Beta-hCG, Qual), Urine    Wet Prep, Vaginal      Medical Decision Making  Patient presents with acute onset uterine discomfort and suprapubic pains.  Urinalysis was negative for UTI.  No signs of hematuria to make concerns for kidney stones low.  Urine pregnancy is negative at this time to rule out ectopic versus uterine pregnancy.  Pelvic exam showed a normal vaginal mucosa with no cervical tenderness.  Wet prep was negative.  Unsure of what is causing patient's discomfort.  Do suspect possible mild ovarian cyst, but this will likely resolve on its own.  Recommend conservative measures and monitoring symptoms.  Recommend warm compresses and over-the-counter analgesics as needed.  Patient also had no tenderness on abdominal exam to make concern for diverticulitis low.  Did provide educational materials about diverticulitis so patient understood signs of worsening symptoms.  Discussed signs of worsening symptoms and when to follow-up with PCP if no symptom improvement.    Patient Instructions   Patient Education     Diverticulitis    Some people get pouches along the wall of the colon as they get older. The pouches, called diverticuli, usually cause no symptoms. If the pouches become blocked, you can get an infection. This infection is called diverticulitis. It causes pain in your lower abdomen and fever. If not treated, it can become a serious condition, causing an abscess to form inside the pouch. The abscess may block the intestinal tract even or rupture, spreading infection throughout the  abdomen.  When treatment is started early, oral antibiotics alone may be enough to cure diverticulitis. This method is tried first. But, if you don't improve or if your condition gets worse while using oral antibiotics, you may need to be admitted to the hospital for IV antibiotics. Severe cases may require surgery.  Home care  The following guidelines will help you care for yourself at home:    During the acute illness, rest and follow your healthcare provider's instructions about diet. Sometimes you will need to follow a clear liquid diet to rest your bowel. Once your symptoms are better, you may be told to follow a low-fiber diet for some time. Include foods like:  ? Flake cereal, mashed potatoes, pancakes, waffles, pasta, white bread, rice, applesauce, bananas, eggs, fish, poultry, tofu, and cooked soft vegetables    Take antibiotics exactly as instructed. Don't miss any doses or stop taking the medication, even if you feel better.    Monitor your temperature and tell your healthcare provider if you have rising temperatures.  Preventing future attacks  Once you have an episode of diverticulitis, you are at risk for having it again. After you have recovered from this episode, you may be able to lower your risk by eating a high-fiber diet (20 gm/day to 35 gm/day of fiber). This cleans out the colon pouches that already exist and may prevent new ones from forming. Foods high in fiber include fresh fruits and edible peelings, raw or lightly cooked vegetables, whole grain cereals and breads, dried beans and peas, and bran.  Other steps that can help prevent future attacks include:    Take your medicines, such as antibiotics, as your healthcare provider says.    Drink 6 to 8 glasses of water every day, unless told otherwise.    Use a heating pad or hot water bottle to help abdominal cramping or pain.    Begin an exercise program. Ask your healthcare provider how to get started. You can benefit from simple activities  such as walking or gardening.    Treat diarrhea with a bland diet. Start with liquids only; then slowly add fiber over time.    Watch for changes in your bowel movements (constipation to diarrhea). Avoid constipation by eating a high fiber diet and taking a stool softener if needed.    Get plenty of rest and sleep.  Follow-up care  Follow up with your healthcare provider as advised or sooner if you are not getting better in the next 2 days.  When to seek medical advice  Call your healthcare provider right away if any of these occur:    Fever of 100.4 F (38 C) or higher, or as directed by your healthcare provider    Repeated vomiting or swelling of the abdomen    Weakness, dizziness, light-headedness    Pain in your abdomen that gets worse, severe, or spreads to your back    Pain that moves to the right lower abdomen    Rectal bleeding (stools that are red, black or maroon color)    Unexpected vaginal bleeding  Date Last Reviewed: 9/1/2016 2000-2017 [employer]. [address] [place] [address]. All rights reserved. This information is not intended as a substitute for professional medical care. Always follow your healthcare professional's instructions.                 Subjective:       Radha Dickerson is a 30 y.o. female here for evaluation of suprapubic pains and left flank pain.  Onset of symptoms was 3 to 4 days ago with gradual worsening since then.  Patient initially developed cervical pain that has since resolved.  She also notes new increased white discharge.  She then developed suprapubic pressure and increased urinary frequency.  Suprapubic pressure pain radiates to patient's left flank.  This pain is rated 3 out of 10 in severity at its worst.  Associated symptoms include nausea. Although patient has history of IBS, she denies sensation of constipation and has been moving her bowels regularly.  Patient otherwise denies fevers, back pains, emesis, dysuria, and hematuria.  She has  low concerns for STDs as she has had the same partner for 6 years.  Last menstrual cycle was 2 weeks ago.    The following portions of the patient's history were reviewed and updated as appropriate: allergies, current medications and problem list.    Review of Systems  Pertinent items are noted in HPI.     Allergies  Allergies   Allergen Reactions   ? Grapefruit Unknown   ? Morphine Other (See Comments)     Muscle cramps       Family History   Problem Relation Age of Onset   ? Cancer Mother         skin   ? Alcohol abuse Mother    ? Hyperlipidemia Father    ? Breast cancer Paternal Aunt    ? Breast cancer Paternal Grandmother    ? Colon cancer Maternal Grandmother    ? Stroke Maternal Grandmother    ? Breast cancer Paternal Aunt    ? Breast cancer Paternal Aunt    ? Breast cancer Paternal cousin    ? Breast cancer Paternal cousin    ? Diabetes Other        Social History     Socioeconomic History   ? Marital status: Single     Spouse name: None   ? Number of children: None   ? Years of education: None   ? Highest education level: None   Occupational History   ? None   Social Needs   ? Financial resource strain: None   ? Food insecurity     Worry: None     Inability: None   ? Transportation needs     Medical: None     Non-medical: None   Tobacco Use   ? Smoking status: Never Smoker   ? Smokeless tobacco: Never Used   Substance and Sexual Activity   ? Alcohol use: No     Comment: occ social drink   ? Drug use: No   ? Sexual activity: Yes     Partners: Male     Birth control/protection: Condom   Lifestyle   ? Physical activity     Days per week: None     Minutes per session: None   ? Stress: None   Relationships   ? Social connections     Talks on phone: None     Gets together: None     Attends [employer] service: None     Active member of club or organization: None     Attends meetings of clubs or organizations: None     Relationship status: None   ? Intimate partner violence     Fear of current or ex partner: None      Emotionally abused: None     Physically abused: None     Forced sexual activity: None   Other Topics Concern   ? None   Social History Narrative   ? None         Objective:       /81 (Patient Site: Left Arm, Patient Position: Sitting, Cuff Size: Adult Regular)   Pulse 88   Temp 98.4  F (36.9  C) (Oral)   Resp 20   Wt (!) 244 lb (110.7 kg)   LMP 06/01/2021 (Approximate)   SpO2 99%   BMI 41.88 kg/m    General appearance: alert, appears stated age, cooperative, no distress and non-toxic  Back: No CVA tenderness  Lungs: clear to auscultation bilaterally  Heart: regular rate and rhythm, S1, S2 normal, no murmur, click, rub or gallop  Abdomen: soft, non-tender; bowel sounds normal; no masses,  no organomegaly  Pelvic: cervix normal in appearance, external genitalia normal, no cervical motion tenderness and Vaginal mucosa appears nonerythematous, increased white discharge throughout the vaginal mucosa  Skin: Skin color, texture, turgor normal. No rashes or lesions     Lab Results    Recent Results (from the past 24 hour(s))   Urinalysis-UC if Indicated   Result Value Ref Range    Color, UA Yellow Colorless, Yellow, Straw, Light Yellow    Clarity, UA Clear Clear    Glucose, UA Negative Negative    Protein, UA Negative Negative    Bilirubin, UA Negative Negative    Urobilinogen, UA 0.2 E.U./dL 0.2 E.U./dL, 1.0 E.U./dL    pH, UA 6.0 5.0 - 8.0    Blood, UA Negative Negative    Ketones, UA Negative Negative    Nitrite, UA Negative Negative    Leukocytes, UA Negative Negative    Specific Gravity, UA >=1.030 1.005 - 1.030    RBC, UA None Seen None Seen, 0-2 hpf    WBC, UA None Seen None Seen, 0-5 hpf    Bacteria, UA None Seen None Seen    Squam Epithel, UA 5-10 (!) None Seen, 0-5 lpf   Pregnancy (Beta-hCG, Qual), Urine   Result Value Ref Range    Pregnancy Test, Urine Negative Negative   Wet Prep, Vaginal    Specimen: Genital   Result Value Ref Range    Yeast Result No yeast seen No yeast seen    Trichomonas No  Trichomonas seen No Trichomonas seen    Clue Cells, Wet Prep No Clue cells seen No Clue cells seen     I personally reviewed these results and discussed findings with the patient.

## 2021-07-04 NOTE — PATIENT INSTRUCTIONS - HE
Patient Instructions by Clement Robert PA-C at 6/23/2021  6:20 PM     Author: Clement Robert PA-C Service: -- Author Type: Physician Assistant    Filed: 6/23/2021  7:54 PM Encounter Date: 6/23/2021 Status: Signed    : Clement Robert PA-C (Physician Assistant)       Patient Education     Diverticulitis    Some people get pouches along the wall of the colon as they get older. The pouches, called diverticuli, usually cause no symptoms. If the pouches become blocked, you can get an infection. This infection is called diverticulitis. It causes pain in your lower abdomen and fever. If not treated, it can become a serious condition, causing an abscess to form inside the pouch. The abscess may block the intestinal tract even or rupture, spreading infection throughout the abdomen.  When treatment is started early, oral antibiotics alone may be enough to cure diverticulitis. This method is tried first. But, if you don't improve or if your condition gets worse while using oral antibiotics, you may need to be admitted to the hospital for IV antibiotics. Severe cases may require surgery.  Home care  The following guidelines will help you care for yourself at home:    During the acute illness, rest and follow your healthcare provider's instructions about diet. Sometimes you will need to follow a clear liquid diet to rest your bowel. Once your symptoms are better, you may be told to follow a low-fiber diet for some time. Include foods like:  ? Flake cereal, mashed potatoes, pancakes, waffles, pasta, white bread, rice, applesauce, bananas, eggs, fish, poultry, tofu, and cooked soft vegetables    Take antibiotics exactly as instructed. Don't miss any doses or stop taking the medication, even if you feel better.    Monitor your temperature and tell your healthcare provider if you have rising temperatures.  Preventing future attacks  Once you have an episode of diverticulitis, you are at risk for having it again.  After you have recovered from this episode, you may be able to lower your risk by eating a high-fiber diet (20 gm/day to 35 gm/day of fiber). This cleans out the colon pouches that already exist and may prevent new ones from forming. Foods high in fiber include fresh fruits and edible peelings, raw or lightly cooked vegetables, whole grain cereals and breads, dried beans and peas, and bran.  Other steps that can help prevent future attacks include:    Take your medicines, such as antibiotics, as your healthcare provider says.    Drink 6 to 8 glasses of water every day, unless told otherwise.    Use a heating pad or hot water bottle to help abdominal cramping or pain.    Begin an exercise program. Ask your healthcare provider how to get started. You can benefit from simple activities such as walking or gardening.    Treat diarrhea with a bland diet. Start with liquids only; then slowly add fiber over time.    Watch for changes in your bowel movements (constipation to diarrhea). Avoid constipation by eating a high fiber diet and taking a stool softener if needed.    Get plenty of rest and sleep.  Follow-up care  Follow up with your healthcare provider as advised or sooner if you are not getting better in the next 2 days.  When to seek medical advice  Call your healthcare provider right away if any of these occur:    Fever of 100.4 F (38 C) or higher, or as directed by your healthcare provider    Repeated vomiting or swelling of the abdomen    Weakness, dizziness, light-headedness    Pain in your abdomen that gets worse, severe, or spreads to your back    Pain that moves to the right lower abdomen    Rectal bleeding (stools that are red, black or maroon color)    Unexpected vaginal bleeding  Date Last Reviewed: 9/1/2016 2000-2017 [employer]. [address] [place] [id number]. All rights reserved. This information is not intended as a substitute for professional medical care. Always follow your  healthcare professional's instructions.

## 2021-07-06 VITALS
HEART RATE: 88 BPM | WEIGHT: 244 LBS | BODY MASS INDEX: 41.88 KG/M2 | DIASTOLIC BLOOD PRESSURE: 81 MMHG | TEMPERATURE: 98.4 F | SYSTOLIC BLOOD PRESSURE: 136 MMHG | OXYGEN SATURATION: 99 % | RESPIRATION RATE: 20 BRPM

## 2021-07-14 PROBLEM — F32.2 SEVERE MAJOR DEPRESSION, SINGLE EPISODE, WITHOUT PSYCHOTIC FEATURES (H): Status: RESOLVED | Noted: 2017-11-12 | Resolved: 2019-07-11

## 2021-07-15 ENCOUNTER — MYC MEDICAL ADVICE (OUTPATIENT)
Dept: FAMILY MEDICINE | Facility: CLINIC | Age: 31
End: 2021-07-15
Payer: COMMERCIAL

## 2021-07-15 DIAGNOSIS — Z01.84 IMMUNITY STATUS TESTING: ICD-10-CM

## 2021-07-15 DIAGNOSIS — Z11.1 SCREENING EXAMINATION FOR PULMONARY TUBERCULOSIS: Primary | ICD-10-CM

## 2021-07-20 ENCOUNTER — LAB (OUTPATIENT)
Dept: LAB | Facility: CLINIC | Age: 31
End: 2021-07-20

## 2021-07-20 DIAGNOSIS — Z01.84 IMMUNITY STATUS TESTING: ICD-10-CM

## 2021-07-20 DIAGNOSIS — Z11.1 SCREENING EXAMINATION FOR PULMONARY TUBERCULOSIS: ICD-10-CM

## 2021-07-20 PROCEDURE — 86706 HEP B SURFACE ANTIBODY: CPT

## 2021-07-20 PROCEDURE — 86762 RUBELLA ANTIBODY: CPT

## 2021-07-20 PROCEDURE — 86765 RUBEOLA ANTIBODY: CPT

## 2021-07-20 PROCEDURE — 86787 VARICELLA-ZOSTER ANTIBODY: CPT

## 2021-07-20 PROCEDURE — 86481 TB AG RESPONSE T-CELL SUSP: CPT

## 2021-07-20 PROCEDURE — 86735 MUMPS ANTIBODY: CPT | Performed by: FAMILY MEDICINE

## 2021-07-20 PROCEDURE — 36415 COLL VENOUS BLD VENIPUNCTURE: CPT | Performed by: FAMILY MEDICINE

## 2021-07-21 LAB
GAMMA INTERFERON BACKGROUND BLD IA-ACNC: 0.02 IU/ML
HBV SURFACE AB SERPLBLD QL IA.RAPID: POSITIVE
M TB IFN-G BLD-IMP: NEGATIVE
M TB IFN-G CD4+ BCKGRND COR BLD-ACNC: 9.98 IU/ML
MEV IGG SER IA-ACNC: POSITIVE
MITOGEN IGNF BCKGRD COR BLD-ACNC: 0.05 IU/ML
MITOGEN IGNF BCKGRD COR BLD-ACNC: 0.07 IU/ML
MUV IGG SER QL IA: POSITIVE
QUANTIFERON MITOGEN: 10 IU/ML
QUANTIFERON NIL TUBE: 0.02 IU/ML
QUANTIFERON TB1 TUBE: 0.07 IU/ML
QUANTIFERON TB2 TUBE: 0.09
RUBV IGG SERPL QL IA: POSITIVE
VZV IGG SER QL IA: POSITIVE

## 2021-08-08 ENCOUNTER — HEALTH MAINTENANCE LETTER (OUTPATIENT)
Age: 31
End: 2021-08-08

## 2021-08-22 DIAGNOSIS — L21.9 SEBORRHEIC DERMATITIS OF SCALP: Primary | ICD-10-CM

## 2021-08-23 RX ORDER — FLUOCINONIDE TOPICAL SOLUTION USP, 0.05% 0.5 MG/ML
SOLUTION TOPICAL
Qty: 60 ML | Refills: 0 | Status: SHIPPED | OUTPATIENT
Start: 2021-08-23 | End: 2021-12-25

## 2021-08-24 ENCOUNTER — HOSPITAL ENCOUNTER (OUTPATIENT)
Dept: PHYSICAL THERAPY | Facility: REHABILITATION | Age: 31
End: 2021-08-24
Attending: NURSE PRACTITIONER
Payer: COMMERCIAL

## 2021-08-24 DIAGNOSIS — R42 DIZZINESS: ICD-10-CM

## 2021-08-24 DIAGNOSIS — F07.81 POST CONCUSSION SYNDROME: ICD-10-CM

## 2021-08-24 PROCEDURE — 97162 PT EVAL MOD COMPLEX 30 MIN: CPT | Mod: GP | Performed by: PHYSICAL THERAPIST

## 2021-08-24 PROCEDURE — 97110 THERAPEUTIC EXERCISES: CPT | Mod: GP | Performed by: PHYSICAL THERAPIST

## 2021-09-08 ENCOUNTER — HOSPITAL ENCOUNTER (OUTPATIENT)
Dept: PHYSICAL THERAPY | Facility: REHABILITATION | Age: 31
End: 2021-09-08
Payer: COMMERCIAL

## 2021-09-08 DIAGNOSIS — G44.309 POST-CONCUSSION HEADACHE: ICD-10-CM

## 2021-09-08 DIAGNOSIS — R42 DIZZINESS: ICD-10-CM

## 2021-09-08 DIAGNOSIS — F07.81 POST CONCUSSION SYNDROME: Primary | ICD-10-CM

## 2021-09-08 PROCEDURE — 97140 MANUAL THERAPY 1/> REGIONS: CPT | Mod: GP | Performed by: PHYSICAL THERAPIST

## 2021-10-03 ENCOUNTER — HEALTH MAINTENANCE LETTER (OUTPATIENT)
Age: 31
End: 2021-10-03

## 2021-11-08 ENCOUNTER — APPOINTMENT (OUTPATIENT)
Dept: URGENT CARE | Facility: CLINIC | Age: 31
End: 2021-11-08
Payer: COMMERCIAL

## 2021-11-15 ENCOUNTER — TELEPHONE (OUTPATIENT)
Dept: PHYSICAL THERAPY | Facility: REHABILITATION | Age: 31
End: 2021-11-15
Payer: COMMERCIAL

## 2021-11-22 ENCOUNTER — OFFICE VISIT (OUTPATIENT)
Dept: FAMILY MEDICINE | Facility: CLINIC | Age: 31
End: 2021-11-22
Payer: COMMERCIAL

## 2021-11-22 ENCOUNTER — HOSPITAL ENCOUNTER (OUTPATIENT)
Dept: PHYSICAL THERAPY | Facility: REHABILITATION | Age: 31
End: 2021-11-22
Payer: COMMERCIAL

## 2021-11-22 VITALS
BODY MASS INDEX: 44.01 KG/M2 | OXYGEN SATURATION: 98 % | WEIGHT: 256.4 LBS | SYSTOLIC BLOOD PRESSURE: 120 MMHG | DIASTOLIC BLOOD PRESSURE: 86 MMHG | HEART RATE: 73 BPM

## 2021-11-22 DIAGNOSIS — B99.9 RECURRENT INFECTIONS: ICD-10-CM

## 2021-11-22 DIAGNOSIS — R42 DIZZINESS: ICD-10-CM

## 2021-11-22 DIAGNOSIS — R22.1 LOCALIZED SWELLING, MASS AND LUMP, NECK: ICD-10-CM

## 2021-11-22 DIAGNOSIS — M54.12 CERVICAL RADICULOPATHY: ICD-10-CM

## 2021-11-22 DIAGNOSIS — F90.9 ATTENTION DEFICIT HYPERACTIVITY DISORDER (ADHD), UNSPECIFIED ADHD TYPE: ICD-10-CM

## 2021-11-22 DIAGNOSIS — M54.2 NECK PAIN: Primary | ICD-10-CM

## 2021-11-22 DIAGNOSIS — G44.309 POST-CONCUSSION HEADACHE: ICD-10-CM

## 2021-11-22 DIAGNOSIS — F07.81 POST CONCUSSION SYNDROME: Primary | ICD-10-CM

## 2021-11-22 DIAGNOSIS — F41.9 ANXIETY: ICD-10-CM

## 2021-11-22 DIAGNOSIS — F33.1 MODERATE RECURRENT MAJOR DEPRESSION (H): ICD-10-CM

## 2021-11-22 DIAGNOSIS — F32.0 MILD MAJOR DEPRESSION (H): ICD-10-CM

## 2021-11-22 LAB
ALBUMIN SERPL-MCNC: 3.6 G/DL (ref 3.5–5)
ALP SERPL-CCNC: 90 U/L (ref 45–120)
ALT SERPL W P-5'-P-CCNC: 12 U/L (ref 0–45)
ANION GAP SERPL CALCULATED.3IONS-SCNC: 10 MMOL/L (ref 5–18)
AST SERPL W P-5'-P-CCNC: 10 U/L (ref 0–40)
BASOPHILS # BLD AUTO: 0 10E3/UL (ref 0–0.2)
BASOPHILS NFR BLD AUTO: 0 %
BILIRUB SERPL-MCNC: 0.8 MG/DL (ref 0–1)
BUN SERPL-MCNC: 11 MG/DL (ref 8–22)
CALCIUM SERPL-MCNC: 9.6 MG/DL (ref 8.5–10.5)
CHLORIDE BLD-SCNC: 105 MMOL/L (ref 98–107)
CO2 SERPL-SCNC: 26 MMOL/L (ref 22–31)
CORTIS SERPL-MCNC: 6 UG/DL
CREAT SERPL-MCNC: 0.78 MG/DL (ref 0.6–1.1)
EOSINOPHIL # BLD AUTO: 0.2 10E3/UL (ref 0–0.7)
EOSINOPHIL NFR BLD AUTO: 2 %
ERYTHROCYTE [DISTWIDTH] IN BLOOD BY AUTOMATED COUNT: 12.3 % (ref 10–15)
GFR SERPL CREATININE-BSD FRML MDRD: >90 ML/MIN/1.73M2
GLUCOSE BLD-MCNC: 98 MG/DL (ref 70–125)
HBA1C MFR BLD: 5.3 % (ref 0–5.6)
HCT VFR BLD AUTO: 39 % (ref 35–47)
HGB BLD-MCNC: 13.1 G/DL (ref 11.7–15.7)
IMM GRANULOCYTES # BLD: 0 10E3/UL
IMM GRANULOCYTES NFR BLD: 0 %
LYMPHOCYTES # BLD AUTO: 3.1 10E3/UL (ref 0.8–5.3)
LYMPHOCYTES NFR BLD AUTO: 33 %
MCH RBC QN AUTO: 29.2 PG (ref 26.5–33)
MCHC RBC AUTO-ENTMCNC: 33.6 G/DL (ref 31.5–36.5)
MCV RBC AUTO: 87 FL (ref 78–100)
MONOCYTES # BLD AUTO: 0.6 10E3/UL (ref 0–1.3)
MONOCYTES NFR BLD AUTO: 6 %
NEUTROPHILS # BLD AUTO: 5.4 10E3/UL (ref 1.6–8.3)
NEUTROPHILS NFR BLD AUTO: 58 %
PLATELET # BLD AUTO: 295 10E3/UL (ref 150–450)
POTASSIUM BLD-SCNC: 4.5 MMOL/L (ref 3.5–5)
PROT SERPL-MCNC: 6.9 G/DL (ref 6–8)
RBC # BLD AUTO: 4.48 10E6/UL (ref 3.8–5.2)
SODIUM SERPL-SCNC: 141 MMOL/L (ref 136–145)
TSH SERPL DL<=0.005 MIU/L-ACNC: 1.51 UIU/ML (ref 0.3–5)
WBC # BLD AUTO: 9.4 10E3/UL (ref 4–11)

## 2021-11-22 PROCEDURE — 82306 VITAMIN D 25 HYDROXY: CPT | Performed by: FAMILY MEDICINE

## 2021-11-22 PROCEDURE — 82533 TOTAL CORTISOL: CPT | Performed by: FAMILY MEDICINE

## 2021-11-22 PROCEDURE — 86039 ANTINUCLEAR ANTIBODIES (ANA): CPT | Performed by: FAMILY MEDICINE

## 2021-11-22 PROCEDURE — 97140 MANUAL THERAPY 1/> REGIONS: CPT | Mod: GP | Performed by: PHYSICAL THERAPIST

## 2021-11-22 PROCEDURE — 80050 GENERAL HEALTH PANEL: CPT | Performed by: FAMILY MEDICINE

## 2021-11-22 PROCEDURE — 83036 HEMOGLOBIN GLYCOSYLATED A1C: CPT | Performed by: FAMILY MEDICINE

## 2021-11-22 PROCEDURE — 99214 OFFICE O/P EST MOD 30 MIN: CPT | Performed by: FAMILY MEDICINE

## 2021-11-22 PROCEDURE — 36415 COLL VENOUS BLD VENIPUNCTURE: CPT | Performed by: FAMILY MEDICINE

## 2021-11-22 PROCEDURE — 86038 ANTINUCLEAR ANTIBODIES: CPT | Performed by: FAMILY MEDICINE

## 2021-11-22 RX ORDER — ESCITALOPRAM OXALATE 10 MG/1
10 TABLET ORAL
COMMUNITY
Start: 2018-12-01 | End: 2022-05-04

## 2021-11-22 RX ORDER — LORAZEPAM 1 MG/1
TABLET ORAL
Qty: 1 TABLET | Refills: 0 | Status: SHIPPED | OUTPATIENT
Start: 2021-11-22 | End: 2022-07-06

## 2021-11-22 RX ORDER — ALBUTEROL SULFATE 90 UG/1
AEROSOL, METERED RESPIRATORY (INHALATION)
COMMUNITY
Start: 2021-11-08 | End: 2023-06-28

## 2021-11-22 NOTE — PROGRESS NOTES
[facility]    Outpatient Physical Therapy Progress Note  Patient: Radha Dickerson  : 1990    Beginning/End Dates of Reporting Period:  21 to 21    Referring Provider: Judith Friend CNP    Therapy Diagnosis: Post concussion Syndrome, dizziness     Client Self Report: (P) 6/10 Neck pain and more tight than usual due to coughing from bronchitis. I have only had 2 HA's in the past 2 months, which is amazing. The dizziness was gone when the HA was gone- occurs when rolling in bed both directions and pivoting quickly.    Objective Measurements:  Objective Measure: (P) CROM  Details: (P) Left rot 45 deg, right 55 deg, Flexion 4 fingers chin to chest ,  ext 75% of nl with pain base of skul  Objective Measure: (P) Cranial scan  Details: (P) LV head and neck and chest, TRIG, Dural and CN        Goals:  Goal Identifier (P) Self management   Goal Description (P) Pt will be independant in self managment of her condition in 20 weeks   Target Date (P) 21   Date Met      Progress (detail required for progress note): (P) Neck stretching and AROM. Pt currently sees chiro prn- 2x/month, massage therapist monthly. She uses Biofreeze, theracane, tennis balls and foam roller for self body work, and also does stretching and yoga.     Goal Identifier (P) HA   Goal Description (P) HA frequency will decrease to 2x/week and intensity decrease to 0-3/10 in 20 weeks   Target Date (P) 21   Date Met      Progress (detail required for progress note): (P) The last 2 mlonth I have had a HA 1x/wk. The last 2 weeks since bronchitis I have been getting HA 3x/wk. 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     Goal Identifier     Goal Description     Target Date     Date Met      Progress (detail required for progress note):       Goal Identifier     Goal Description     Target Date     Date Met      Progress (detail required for progress note):       Goal Identifier     Goal Description     Target Date     Date Met      Progress (detail required for progress note):       Goal Identifier     Goal Description     Target Date     Date Met      Progress (detail required for progress note):       Goal Identifier     Goal Description     Target Date     Date Met      Progress (detail required for progress note):             Plan:  Continue therapy per current plan of care.    Discharge:  No       11/22/21 0908   Signing Clinician's Name / Credentials   Signing clinician's name / credentials Meg Nicole PT   Session Number   Session Number 3   Progress Note/Recertification   Progress Note Due Date 12/17/21   Progress Note Completed Date 11/22/21   Adult Goals   PT Ortho Eval Goals 1;2;3   Ortho Goal 1   Goal Identifier Self management   Goal Description Pt will be independant in self managment of her condition in 20 weeks   Goal Progress Neck stretching and AROM. Pt currently sees chiro prn- 2x/month, massage therapist monthly. She uses Biofreeze, theracane, tennis balls and foam roller for self body work, and also does stretching and yoga.   Target Date 12/08/21   Ortho Goal 2   Goal Identifier HA   Goal Description HA frequency will decrease to 2x/week and intensity decrease to 0-3/10 in 20 weeks   Goal Progress The last 2 mlonth I have had a HA 1x/wk. The last 2 weeks since bronchitis I have been getting HA 3x/wk. 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   Target Date 12/23/21   Date Met 11/22/21   Subjective Report   Subjective Report 6/10 Neck pain and more tight than usual due to coughing from bronchitis. I have only had 2 HA's in the past 2 months, which is amazing. The dizziness was gone when the HA was gone- occurs when rolling in bed both directions and pivoting quickly.   Objective Measures   Objective Measures Objective Measure 1;Objective Measure 2   Objective Measure 1   Objective Measure CROM   Details Left rot 45 deg, right 55 deg, Flexion 4 fingers chin to chest ,  ext 75% of nl with pain base of skul   Objective Measure 2   Objective Measure Cranial scan   Details LV head and neck and chest, TRIG, Dural and CN   Treatment Interventions   Interventions Manual Therapy   Manual Therapy   Manual Therapy: Mobilization, MFR, MLD, friction massage minutes (15979) 55   Skilled Intervention Counhterstrain to head, neck    Patient Response Decreased TP and improved cranial scan   Treatment Detail  stacked L TRIG1 and R TRIG2-N,stacked EPIC2-7-LV, stacked ALLC2-7-MS, B NORA-LV, R IJ-LV, R TRENA-LV, R PAR-LV, B MAS-LV, R ITHORV   Education   Learner Patient   Readiness Acceptance   Method Demonstration   Response Verbalizes Understanding   Plan   Plan for next session Recheck TRIG and FAC-N, DC of c spine, Dura, sutures and cranial nerve dysfunction treatment   Comments   Comments Pt has not been in since 9/8/21 as she had bronchitis and had to cancel her PT appointments. She continues school for Masters program   Total Session Time   Timed Code Treatment Minutes 55   Total Treatment Time (sum of  timed and untimed services) 55   AMBULATORY CLINICS ONLY-MEDICAL AND TREATMENT DIAGNOSIS   PT Diagnosis Chronic pain syndrome, Myofascial Neck and back dysfunction
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
## 2021-11-22 NOTE — PATIENT INSTRUCTIONS
Consider buproprion (wellbutrin), stratterra as non-stimulant options    Look at [email or web address]    [email or web address]

## 2021-11-23 DIAGNOSIS — E55.9 VITAMIN D DEFICIENCY: Primary | ICD-10-CM

## 2021-11-23 LAB — DEPRECATED CALCIDIOL+CALCIFEROL SERPL-MC: 11 UG/L (ref 30–80)

## 2021-11-23 RX ORDER — ERGOCALCIFEROL 1.25 MG/1
50000 CAPSULE, LIQUID FILLED ORAL WEEKLY
Qty: 12 CAPSULE | Refills: 0 | Status: SHIPPED | OUTPATIENT
Start: 2021-11-23 | End: 2022-02-09

## 2021-11-23 ASSESSMENT — PATIENT HEALTH QUESTIONNAIRE - PHQ9: SUM OF ALL RESPONSES TO PHQ QUESTIONS 1-9: 18

## 2021-11-24 ENCOUNTER — MYC MEDICAL ADVICE (OUTPATIENT)
Dept: FAMILY MEDICINE | Facility: CLINIC | Age: 31
End: 2021-11-24
Payer: COMMERCIAL

## 2021-11-24 DIAGNOSIS — R76.8 POSITIVE ANTINUCLEAR ANTIBODY: Primary | ICD-10-CM

## 2021-11-24 LAB
ANA PAT SER IF-IMP: ABNORMAL
ANA SER QL IF: ABNORMAL
ANA TITR SER IF: ABNORMAL {TITER}

## 2021-11-29 ENCOUNTER — HOSPITAL ENCOUNTER (OUTPATIENT)
Dept: PHYSICAL THERAPY | Facility: REHABILITATION | Age: 31
End: 2021-11-29
Payer: COMMERCIAL

## 2021-11-29 DIAGNOSIS — R42 DIZZINESS: ICD-10-CM

## 2021-11-29 DIAGNOSIS — G44.309 POST-CONCUSSION HEADACHE: ICD-10-CM

## 2021-11-29 DIAGNOSIS — F07.81 POST CONCUSSION SYNDROME: Primary | ICD-10-CM

## 2021-11-29 PROCEDURE — 97140 MANUAL THERAPY 1/> REGIONS: CPT | Mod: GP | Performed by: PHYSICAL THERAPIST

## 2021-12-06 ENCOUNTER — HOSPITAL ENCOUNTER (OUTPATIENT)
Dept: PHYSICAL THERAPY | Facility: REHABILITATION | Age: 31
End: 2021-12-06
Payer: COMMERCIAL

## 2021-12-06 DIAGNOSIS — G44.309 POST-CONCUSSION HEADACHE: ICD-10-CM

## 2021-12-06 DIAGNOSIS — R42 DIZZINESS: ICD-10-CM

## 2021-12-06 DIAGNOSIS — F07.81 POST CONCUSSION SYNDROME: Primary | ICD-10-CM

## 2021-12-06 PROCEDURE — 97140 MANUAL THERAPY 1/> REGIONS: CPT | Mod: GP | Performed by: PHYSICAL THERAPIST

## 2021-12-20 ENCOUNTER — HOSPITAL ENCOUNTER (OUTPATIENT)
Dept: PHYSICAL THERAPY | Facility: REHABILITATION | Age: 31
End: 2021-12-20
Payer: COMMERCIAL

## 2021-12-20 DIAGNOSIS — G44.309 POST-CONCUSSION HEADACHE: ICD-10-CM

## 2021-12-20 DIAGNOSIS — F07.81 POST CONCUSSION SYNDROME: Primary | ICD-10-CM

## 2021-12-20 DIAGNOSIS — R42 DIZZINESS: ICD-10-CM

## 2021-12-20 PROCEDURE — 97110 THERAPEUTIC EXERCISES: CPT | Mod: GP | Performed by: PHYSICAL THERAPIST

## 2021-12-20 NOTE — PROGRESS NOTES
[facility]    Outpatient Physical Therapy Progress Note  Patient: Radha Dickerson  : 1990    Beginning/End Dates of Reporting Period:  21 to 21    Referring Provider: Judith Friend CNP    Therapy Diagnosis: Post concussion Syndrome, Dizziness     Client Self Report: 6/10 Neck pain, 0/10 HA. I must have slept on my neck wrong- I woke up with a stiff neckl. I had finals and studied all day on  and that might have done it too.    Objective Measurements:  Objective Measure: CROM  Details: Left rot 45 deg, right 55 deg, Flexion 4 fingers chin to chest ,  ext 75% of nl with pain base of skul  Objective Measure: SHoulder strength  Details: ER 5-/5, abduction 5-/5, trunk extension  4/5  Objective Measure: SI  Details: SI level          Goals:  Goal Identifier Self management   Goal Description Pt will be independant in self managment of her condition in 20 weeks   Target Date 21   Date Met      Progress (detail required for progress note): Neck stretching and AROM. Pt currently sees chiro prn- 2x/month, massage therapist monthly. She uses Biofreeze, theracane, tennis balls and foam roller for self body work, and also does stretching and yoga.     Goal Identifier HA   Goal Description HA frequency will decrease to 2x/week and intensity decrease to 0-3/10 in 20 weeks   Target Date 21   Date Met      Progress (detail required for progress note): The last 2 mlonth I have had a HA 1x/wk. The last 2 weeks since bronchitis I have been getting HA 3x/wk. 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 When pt was sitting upright more than 10 minutes the mid back -7/10, and neck oain is 5/10.     Goal Identifier     Goal Description     Target Date     Date Met      Progress (detail required for progress note):       Goal Identifier     Goal Description     Target Date     Date Met      Progress (detail required for progress note):       Goal Identifier     Goal Description     Target Date     Date Met      Progress (detail required for progress note):       Goal Identifier     Goal Description     Target Date     Date Met      Progress (detail required for progress note):       Goal Identifier     Goal Description     Target Date     Date Met      Progress (detail required for progress note):             Plan:  Continue therapy per current plan of care.    Discharge:  No     12/20/21 0910   Signing Clinician's Name / Credentials   Signing clinician's name / credentials Meg Nicole PT   Session Number   Session Number 6   Progress Note/Recertification   Progress Note Due Date 01/17/22   Progress Note Completed Date 12/20/21   Adult Goals   PT Ortho Eval Goals 1;2;3   Ortho Goal 1   Goal Identifier Self management   Goal Description Pt will be independant in self managment of her condition in 20 weeks   Goal Progress Neck stretching and AROM. Pt currently sees chiro prn- 2x/month, massage therapist monthly. She uses Biofreeze, theracane, tennis balls and foam roller for self body work, and also does stretching and yoga.   Target Date 12/08/21   Ortho Goal 2   Goal Identifier HA   Goal Description HA frequency will decrease to 2x/week and intensity decrease to 0-3/10 in 20 weeks   Goal Progress The last 2 mlonth I have had a HA 1x/wk. The last 2 weeks since bronchitis I have been getting HA 3x/wk. 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 When pt was sitting upright more than 10 minutes the mid back -7/10, and neck oain is 5/10.   Target Date 02/18/22   Subjective Report   Subjective Report 6/10 Neck pain, 0/10 HA. I must have slept on my neck wrong- I woke up with a stiff neckl. I had finals and studied all day on SUnday and that might have done it too.   Objective Measures   Objective Measures Objective Measure 1;Objective Measure 2;Objective Measure 3   Objective Measure 1   Objective Measure CROM   Details Left rot 45 deg, right 55 deg, Flexion 4 fingers chin to chest ,  ext 75% of nl with pain base of skul   Objective Measure 2   Objective Measure SHoulder strength   Details ER 5-/5, abduction 5-/5, trunk extension  4/5   Objective Measure 3   Objective Measure SI   Details SI level   Treatment Interventions   Interventions Therapeutic Procedure/Exercise   Therapeutic Procedure/exercise   Therapeutic Procedures: strength, endurance, ROM, flexibillity minutes (86337) 55   Skilled Intervention Ther EX   Patient Response Good fatigue, select upper trap more than middle and lower traps during all exercises   Treatment Detail Instructed and performed, sets, reps, freq discussed- orange band issued, rows, shoulder extension, er and modified supermans   Equipment Needs   Equipment Needs Issued orange band, pt does have yellow and greeen band   Education   Learner Patient   Readiness Acceptance   Method Demonstration   Response Verbalizes Understanding   Plan   Home program Shldr band ER/row, ext, superman   Plan for next session Check and treat SI, Dura, sutures and cranial nerve dysfunction treatment   Comments   Comments Pt  with increased neck and upper back tension due to finals and prolonged positioning to study. Exercises were added today to filemon neck and upper back, RC   Total Session Time   Timed Code Treatment Minutes 55   Total Treatment Time (sum of timed and untimed services) 55   AMBULATORY CLINICS ONLY-MEDICAL AND TREATMENT DIAGNOSIS   Medical Diagnosis Post concussion syndrome, Dizziness   PT Diagnosis Chronic pain syndrome, Myofascial Neck and back dysfunction

## 2021-12-20 NOTE — ADDENDUM NOTE
Encounter addended by: Meg Nicole, PT on: 12/20/2021 10:22 AM   Actions taken: Flowsheet data copied forward, Clinical Note Signed, Flowsheet accepted

## 2021-12-22 DIAGNOSIS — L21.9 SEBORRHEIC DERMATITIS OF SCALP: ICD-10-CM

## 2021-12-24 NOTE — TELEPHONE ENCOUNTER
"Routing refill request to provider for review/approval because:  [facility] unable to fill per protocol. PCP please review for refill.     Last Written Prescription Date:  8/23/2021  Last Fill Quantity: 60 mL,  # refills: 0   Last office visit provider:  11/22/2021     Requested Prescriptions   Pending Prescriptions Disp Refills     fluocinonide (LIDEX) 0.05 % external solution [Pharmacy Med Name: Fluocinonide External Solution 0.05 %] 60 mL 0     Sig: Apply sparingly to scalp once or twice daily as needed.       Topical Steroids and Nonsteroidals Protocol Failed - 12/22/2021  4:48 PM        Failed - High potency steroid not ordered        Passed - Patient is age 6 or older        Passed - Authorizing prescriber's most recent note related to this medication read.     If refill request is for ophthalmic use, please forward request to provider for approval.          Passed - Recent (12 mo) or future (30 days) visit within the authorizing provider's specialty     Patient has had an office visit with the authorizing provider or a provider within the authorizing providers department within the previous 12 mos or has a future within next 30 days. See \"Patient Info\" tab in inbasket, or \"Choose Columns\" in Meds & Orders section of the refill encounter.              Passed - Medication is active on med list             Dalia Marti RN 12/24/21 5:09 PM  "

## 2021-12-25 RX ORDER — FLUOCINONIDE TOPICAL SOLUTION USP, 0.05% 0.5 MG/ML
SOLUTION TOPICAL
Qty: 60 ML | Refills: 0 | Status: SHIPPED | OUTPATIENT
Start: 2021-12-25 | End: 2022-05-10

## 2022-01-19 NOTE — ADDENDUM NOTE
Encounter addended by: Meg Nicole, PT on: 1/19/2022 2:27 PM   Actions taken: Clinical Note Signed, Flowsheet accepted, Episode resolved

## 2022-01-19 NOTE — PROGRESS NOTES
[facility]    Outpatient Physical Therapy Discharge Note  Patient: Radha Dickerson  : 1990    Beginning/End Dates of Reporting Period:  21 to 22    Referring Provider: Judith Friend CNP    Therapy Diagnosis: Post concussion Syndrome, Dizziness     Client Self Report: 6/10 Neck pain, 0/10 HA. I must have slept on my neck wrong- I woke up with a stiff neckl. I had finals and studied all day on  and that might have done it too.    Objective Measurements:  Objective Measure: CROM  Details: Left rot 45 deg, right 55 deg, Flexion 4 fingers chin to chest ,  ext 75% of nl with pain base of skul  Objective Measure: SHoulder strength  Details: ER 5-/5, abduction 5-/5, trunk extension  4/5  Objective Measure: SI  Details: SI level          Goals:  Goal Identifier Self management   Goal Description Pt will be independant in self managment of her condition in 20 weeks   Target Date 21   Date Met      Progress (detail required for progress note): Neck stretching and AROM. Pt currently sees chiro prn- 2x/month, massage therapist monthly. She uses Biofreeze, theracane, tennis balls and foam roller for self body work, and also does stretching and yoga.     Goal Identifier HA   Goal Description HA frequency will decrease to 2x/week and intensity decrease to 0-3/10 in 20 weeks   Target Date 21   Date Met      Progress (detail required for progress note): The last 2 mlonth I have had a HA 1x/wk. The last 2 weeks since bronchitis I have been getting HA 3x/wk. 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 When pt was sitting upright more than 10 minutes the mid back -7/10, and neck oain is 5/10.     Goal Identifier     Goal Description     Target Date     Date Met      Progress (detail required for progress note):       Goal Identifier     Goal Description     Target Date     Date Met      Progress (detail required for progress note):       Goal Identifier     Goal Description     Target Date     Date Met      Progress (detail required for progress note):       Goal Identifier     Goal Description     Target Date     Date Met      Progress (detail required for progress note):       Goal Identifier     Goal Description     Target Date     Date Met      Progress (detail required for progress note):             Plan:  Discharge from therapy.    Discharge: Yes    Reason for Discharge: Pt has not been in to PT for one month and has no future visits scheduled. She has reduced neck pain and HA.    Equipment Issued: None     Discharge Plan: Patient to continue home program.       12/20/21 0910   Signing Clinician's Name / Credentials   Signing clinician's name / credentials Meg Nicole PT   Session Number   Session Number 6   Progress Note/Recertification   Progress Note Due Date 01/17/22   Progress Note Completed Date 12/20/21   Adult Goals   PT Ortho Eval Goals 1;2;3   Ortho Goal 1   Goal Identifier Self management   Goal Description Pt will be independant in self managment of her condition in 20 weeks   Goal Progress Neck stretching and AROM. Pt currently sees chiro prn- 2x/month, massage therapist monthly. She uses Biofreeze, theracane, tennis balls and foam roller for self body work, and also does stretching and yoga.   Target Date 12/08/21   Ortho Goal 2   Goal Identifier HA   Goal Description HA frequency will decrease to 2x/week and intensity decrease to 0-3/10 in 20 weeks   Goal Progress The  last 2 mlonth I have had a HA 1x/wk. The last 2 weeks since bronchitis I have been getting HA 3x/wk. 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 When pt was sitting upright more than 10 minutes the mid back -7/10, and neck oain is 5/10.   Target Date 02/18/22   Subjective Report   Subjective Report 6/10 Neck pain, 0/10 HA. I must have slept on my neck wrong- I woke up with a stiff neckl. I had finals and studied all day on SUnday and that might have done it too.   Objective Measures   Objective Measures Objective Measure 1;Objective Measure 2;Objective Measure 3   Objective Measure 1   Objective Measure CROM   Details Left rot 45 deg, right 55 deg, Flexion 4 fingers chin to chest ,  ext 75% of nl with pain base of skul   Objective Measure 2   Objective Measure SHoulder strength   Details ER 5-/5, abduction 5-/5, trunk extension  4/5   Objective Measure 3   Objective Measure SI   Details SI level   Treatment Interventions   Interventions Therapeutic Procedure/Exercise   Therapeutic Procedure/exercise   Therapeutic Procedures: strength, endurance, ROM, flexibillity minutes (49347) 55   Skilled Intervention Ther EX   Patient Response Good fatigue, select upper trap more than middle and lower traps during all exercises   Treatment Detail Instructed and performed, sets, reps, freq discussed- orange band issued, rows, shoulder extension, er and modified supermans   Equipment Needs   Equipment Needs Issued orange band, pt does have yellow and greeen band   Education   Learner Patient   Readiness Acceptance   Method Demonstration   Response  Verbalizes Understanding   Plan   Home program Shldr band ER/row, ext, superman   Plan for next session Check and treat SI, Dura, sutures and cranial nerve dysfunction treatment   Comments   Comments Pt with increased neck and upper back tension due to finals and prolonged positioning to study. Exercises were added today to filemon neck and upper back, RC   Total Session Time   Timed Code Treatment Minutes 55   Total Treatment Time (sum of timed and untimed services) 55   AMBULATORY CLINICS ONLY-MEDICAL AND TREATMENT DIAGNOSIS   Medical Diagnosis Post concussion syndrome, Dizziness   PT Diagnosis Chronic pain syndrome, Myofascial Neck and back dysfunction

## 2022-05-04 DIAGNOSIS — F41.9 ANXIETY: Primary | ICD-10-CM

## 2022-05-04 NOTE — TELEPHONE ENCOUNTER
Chief Complaint   Patient presents with     Refill Request     Lexapro 10 mg      Jes Husain RN on 5/4/2022 at 10:42 AM

## 2022-05-05 RX ORDER — ESCITALOPRAM OXALATE 10 MG/1
10 TABLET ORAL DAILY
Qty: 90 TABLET | Refills: 1 | Status: SHIPPED | OUTPATIENT
Start: 2022-05-05 | End: 2022-07-06

## 2022-05-10 DIAGNOSIS — L21.9 SEBORRHEIC DERMATITIS OF SCALP: ICD-10-CM

## 2022-05-10 RX ORDER — FLUOCINONIDE TOPICAL SOLUTION USP, 0.05% 0.5 MG/ML
SOLUTION TOPICAL
Qty: 60 ML | Refills: 4 | Status: SHIPPED | OUTPATIENT
Start: 2022-05-10 | End: 2023-06-07

## 2022-05-10 NOTE — TELEPHONE ENCOUNTER
Pt is needing refill of Rx as she is completely out. Pt is requesting refills so she doesn't have to contact clinic every time. Please advise

## 2022-05-24 ENCOUNTER — TELEPHONE (OUTPATIENT)
Dept: LAB | Facility: CLINIC | Age: 32
End: 2022-05-24
Payer: COMMERCIAL

## 2022-05-24 DIAGNOSIS — E55.9 VITAMIN D DEFICIENCY: Primary | ICD-10-CM

## 2022-06-04 ENCOUNTER — MYC MEDICAL ADVICE (OUTPATIENT)
Dept: FAMILY MEDICINE | Facility: CLINIC | Age: 32
End: 2022-06-04
Payer: COMMERCIAL

## 2022-06-06 ENCOUNTER — HOSPITAL ENCOUNTER (OUTPATIENT)
Dept: MRI IMAGING | Facility: HOSPITAL | Age: 32
Discharge: HOME | End: 2022-06-06
Attending: FAMILY MEDICINE | Admitting: FAMILY MEDICINE
Payer: COMMERCIAL

## 2022-06-06 DIAGNOSIS — M54.2 NECK PAIN: ICD-10-CM

## 2022-06-06 DIAGNOSIS — M54.12 CERVICAL RADICULOPATHY: ICD-10-CM

## 2022-06-06 DIAGNOSIS — R22.1 LOCALIZED SWELLING, MASS AND LUMP, NECK: ICD-10-CM

## 2022-06-06 PROCEDURE — 72141 MRI NECK SPINE W/O DYE: CPT

## 2022-06-06 NOTE — TELEPHONE ENCOUNTER
Routing to PCP to advise on patient's request for a dosage increase in her anti depressants.    Selena Silva RN on 6/6/2022 at 9:01 AM

## 2022-07-05 ASSESSMENT — PATIENT HEALTH QUESTIONNAIRE - PHQ9
SUM OF ALL RESPONSES TO PHQ QUESTIONS 1-9: 6
10. IF YOU CHECKED OFF ANY PROBLEMS, HOW DIFFICULT HAVE THESE PROBLEMS MADE IT FOR YOU TO DO YOUR WORK, TAKE CARE OF THINGS AT HOME, OR GET ALONG WITH OTHER PEOPLE: VERY DIFFICULT
SUM OF ALL RESPONSES TO PHQ QUESTIONS 1-9: 6

## 2022-07-06 ENCOUNTER — VIRTUAL VISIT (OUTPATIENT)
Dept: FAMILY MEDICINE | Facility: CLINIC | Age: 32
End: 2022-07-06
Payer: COMMERCIAL

## 2022-07-06 DIAGNOSIS — L02.92 BOIL: ICD-10-CM

## 2022-07-06 DIAGNOSIS — L30.9 DERMATITIS: ICD-10-CM

## 2022-07-06 DIAGNOSIS — F32.0 MILD MAJOR DEPRESSION (H): Primary | ICD-10-CM

## 2022-07-06 PROCEDURE — 99213 OFFICE O/P EST LOW 20 MIN: CPT | Mod: 95 | Performed by: FAMILY MEDICINE

## 2022-07-06 RX ORDER — ESCITALOPRAM OXALATE 20 MG/1
20 TABLET ORAL DAILY
Qty: 90 TABLET | Refills: 3 | Status: SHIPPED | OUTPATIENT
Start: 2022-07-06 | End: 2023-07-10

## 2022-07-06 ASSESSMENT — PATIENT HEALTH QUESTIONNAIRE - PHQ9
10. IF YOU CHECKED OFF ANY PROBLEMS, HOW DIFFICULT HAVE THESE PROBLEMS MADE IT FOR YOU TO DO YOUR WORK, TAKE CARE OF THINGS AT HOME, OR GET ALONG WITH OTHER PEOPLE: VERY DIFFICULT
SUM OF ALL RESPONSES TO PHQ QUESTIONS 1-9: 6

## 2022-07-06 ASSESSMENT — ENCOUNTER SYMPTOMS: NERVOUS/ANXIOUS: 1

## 2022-07-06 NOTE — PROGRESS NOTES
Radha is a 32 year old who is being evaluated via a billable video visit.      How would you like to obtain your AVS? MyChart  If the video visit is dropped, the invitation should be resent by: Text to cell phone: [phone]  Will anyone else be joining your video visit? No          Assessment & Plan     Mild major depression (H)  Will increase dose of escitalopram to 20 mg daily.  Counseled on use of medication side effects.  Encouraged regular exercise and other self-cares.  Continue to see therapist on a regular basis.  Follow-up in 1 month if further medication adjustments are felt to be needed.  - escitalopram (LEXAPRO) 20 MG tablet  Dispense: 90 tablet; Refill: 3    Boil  Referral placed to dermatology for further evaluation of recurrent boils.  - Adult Dermatology Referral    Dermatitis  Referral placed to dermatology for further evaluation of possible psoriasis versus eczema versus other chronic skin condition  - Adult Dermatology Referral                   No follow-ups on file.    Cassy Valenzuela MD  [facility]   Radha is a 32 year old, presenting for the following health issues:  Anxiety (Increase escitalopram) and Depression      Anxiety    History of Present Illness       Reason for visit:  Increase depression medication    She eats 2-3 servings of fruits and vegetables daily.She consumes 1 sweetened beverage(s) daily.She exercises with enough effort to increase her heart rate 10 to 19 minutes per day.  She exercises with enough effort to increase her heart rate 3 or less days per week. She is missing 1 dose(s) of medications per week.  She is not taking prescribed medications regularly due to remembering to take.    Today's PHQ-9         PHQ-9 Total Score: 6    PHQ-9 Q9 Thoughts of better off dead/self-harm past 2 weeks :   Not at all    How difficult have these problems made it for you to do your work, take care of things at home, or get along with other people:  Very difficult     She is evaluated today via video encounter to discuss increasing dose of escitalopram.  She has history of generalized anxiety disorder as well as depression.  She is currently taking escitalopram 10 mg daily.  Sees a therapist twice a month as well.  Lately has noticed more symptoms of depression, especially if she is not busy.  She is currently in grad school and taking classes full-time.  Summer classes are accelerated and she has assignments do daily.  Usually able to sleep okay.  States her anxiety comes and goes but overall feels this is manageable.  Did have some recent suicidal ideation and that is what prompted her to reach out to discuss changing the dosage in her medication.  She reports no active thoughts or plans to harm herself.  She does try to get regular exercise and do other self-cares to maintain her mental health.    She does report some concern about recurrent abscesses that she experiences in the groin as well as in the left underarm area.  She also expresses concern about possible psoriasis.  Has noticed patches of dry scaly skin that are pruritic on her knees and elbows.  She does have dryness and itching of the scalp and uses fluocinonide solution on a regular basis which is helpful.  She has not been seen by dermatologist.  Meds and allergies reviewed and updated.  No other concerns or questions        Review of Systems   Psychiatric/Behavioral: The patient is nervous/anxious.             Objective           Vitals:  No vitals were obtained today due to virtual visit.    Physical Exam   GENERAL: Healthy, alert and no distress  EYES: Eyes grossly normal to inspection.  No discharge or erythema, or obvious scleral/conjunctival abnormalities.  RESP: No audible wheeze, cough, or visible cyanosis.  No visible retractions or increased work of breathing.    SKIN: Visible skin clear. No significant rash, abnormal pigmentation or lesions.  NEURO: Cranial nerves grossly intact.   Mentation and speech appropriate for age.  PSYCH: Mentation appears normal, affect normal/bright, judgement and insight intact, normal speech and appearance well-groomed.                Video-Visit Details    Video Start Time: 5:20    Type of service:  Video Visit    Video End Time:5:41    Originating Location (pt. Location): Home    Distant Location (provider location):  [facility]     Platform used for Video Visit: [employer] - [employer]    .  Ly.

## 2022-08-04 ENCOUNTER — MYC MEDICAL ADVICE (OUTPATIENT)
Dept: FAMILY MEDICINE | Facility: CLINIC | Age: 32
End: 2022-08-04

## 2022-08-04 DIAGNOSIS — Z11.1 SCREENING EXAMINATION FOR PULMONARY TUBERCULOSIS: Primary | ICD-10-CM

## 2022-08-04 NOTE — TELEPHONE ENCOUNTER
Routing to PCP, if appropriate orders for quantiferon TB gold order has been pended for approval. She was just seen on 7/6.    Thanks,  Celsa Doe RN on 8/4/2022 at 1:42 PM

## 2022-08-05 ENCOUNTER — LAB (OUTPATIENT)
Dept: LAB | Facility: CLINIC | Age: 32
End: 2022-08-05
Payer: COMMERCIAL

## 2022-08-05 DIAGNOSIS — E55.9 VITAMIN D DEFICIENCY: ICD-10-CM

## 2022-08-05 DIAGNOSIS — Z11.59 NEED FOR HEPATITIS C SCREENING TEST: Primary | ICD-10-CM

## 2022-08-05 DIAGNOSIS — Z11.1 SCREENING EXAMINATION FOR PULMONARY TUBERCULOSIS: ICD-10-CM

## 2022-08-05 PROCEDURE — 36415 COLL VENOUS BLD VENIPUNCTURE: CPT

## 2022-08-05 PROCEDURE — 82306 VITAMIN D 25 HYDROXY: CPT

## 2022-08-05 PROCEDURE — 86481 TB AG RESPONSE T-CELL SUSP: CPT

## 2022-08-08 LAB
DEPRECATED CALCIDIOL+CALCIFEROL SERPL-MC: 12 UG/L (ref 20–75)
GAMMA INTERFERON BACKGROUND BLD IA-ACNC: 0.07 IU/ML
M TB IFN-G BLD-IMP: POSITIVE
M TB IFN-G CD4+ BCKGRND COR BLD-ACNC: 9.93 IU/ML
MITOGEN IGNF BCKGRD COR BLD-ACNC: 0.25 IU/ML
MITOGEN IGNF BCKGRD COR BLD-ACNC: 0.46 IU/ML
QUANTIFERON MITOGEN: 10 IU/ML
QUANTIFERON NIL TUBE: 0.07 IU/ML
QUANTIFERON TB1 TUBE: 0.32 IU/ML
QUANTIFERON TB2 TUBE: 0.53

## 2022-08-09 DIAGNOSIS — E55.9 VITAMIN D DEFICIENCY: ICD-10-CM

## 2022-08-09 DIAGNOSIS — R76.12 POSITIVE QUANTIFERON-TB GOLD TEST: Primary | ICD-10-CM

## 2022-08-09 RX ORDER — ERGOCALCIFEROL 1.25 MG/1
50000 CAPSULE, LIQUID FILLED ORAL WEEKLY
Qty: 12 CAPSULE | Refills: 0 | Status: SHIPPED | OUTPATIENT
Start: 2022-08-09 | End: 2022-10-26

## 2022-08-10 ENCOUNTER — ANCILLARY PROCEDURE (OUTPATIENT)
Dept: GENERAL RADIOLOGY | Facility: CLINIC | Age: 32
End: 2022-08-10
Attending: FAMILY MEDICINE
Payer: COMMERCIAL

## 2022-08-10 ENCOUNTER — TELEPHONE (OUTPATIENT)
Dept: INFECTIOUS DISEASES | Facility: CLINIC | Age: 32
End: 2022-08-10

## 2022-08-10 DIAGNOSIS — R76.12 POSITIVE QUANTIFERON-TB GOLD TEST: Primary | ICD-10-CM

## 2022-08-10 DIAGNOSIS — R76.12 POSITIVE QUANTIFERON-TB GOLD TEST: ICD-10-CM

## 2022-08-10 PROCEDURE — 71046 X-RAY EXAM CHEST 2 VIEWS: CPT | Mod: TC | Performed by: RADIOLOGY

## 2022-08-11 ENCOUNTER — MYC MEDICAL ADVICE (OUTPATIENT)
Dept: FAMILY MEDICINE | Facility: CLINIC | Age: 32
End: 2022-08-11

## 2022-08-24 ENCOUNTER — OFFICE VISIT (OUTPATIENT)
Dept: INFECTIOUS DISEASES | Facility: CLINIC | Age: 32
End: 2022-08-24
Payer: COMMERCIAL

## 2022-08-24 VITALS
SYSTOLIC BLOOD PRESSURE: 122 MMHG | BODY MASS INDEX: 46 KG/M2 | HEART RATE: 76 BPM | WEIGHT: 268 LBS | DIASTOLIC BLOOD PRESSURE: 84 MMHG | TEMPERATURE: 98.3 F

## 2022-08-24 DIAGNOSIS — R76.12 POSITIVE QUANTIFERON-TB GOLD TEST: ICD-10-CM

## 2022-08-24 PROCEDURE — 99204 OFFICE O/P NEW MOD 45 MIN: CPT | Performed by: INTERNAL MEDICINE

## 2022-08-24 NOTE — PROGRESS NOTES
General ID Service Consult      Patient: Radha Dickerson  YOB: 1990, MRN: [id number]  Date of Admission:  (Not on file)  Date of Consult: 08/24/2022  Consult Requested by: No att. providers found  Admission Diagnosis: Positive QuantiFERON-TB Gold test [R76.12]      ID Assessment & Plan   Latent tuberculosis positive QuantiFERON gold  Was negative last summer this is his possible seroconversion  No underlying liver disease  Chest x-ray normal  Speech therapy student    Plan  We discussed the interpretation of positive QuantiFERON test  Repeat QuantiFERON gold next week to make sure no reversion  If positive initiate treatment with rifampin for 4 months 600 mg daily  We discussed the side effects such as urinary and body fluid discoloration and hepatotoxicity and symptoms of such  We discussed the drug interaction with citalopram and that she might need to increase the dose potentially doubling the dose      Elzbieta Ramirez MD    ______________________________________________________________________        History of Present Illness   Radha Dickerson is a 32 year old female referred for positive QuantiFERON gold.  She was negative last summer.  No international travel.  Born in the [place].  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  Clinically she feels fine no fevers no chills no sweats no coughing or shortness of breath  Chest x-ray unremarkable    She reported few days history of left-sided neck discomfort but not sore throat  No fevers    Review of Systems   The 10 point Review of Systems is negative other than noted in the HPI or here.     Past Medical History    Past Medical History:   Diagnosis Date     Anxiety      Ectopic pregnancy      Mild major depression (H) 11/16/2019     Morbid obesity (H) 7/11/2019       Past Surgical History   Past Surgical History:   Procedure Laterality Date     TONSILLECTOMY         Social History   Social History      Tobacco Use     Smoking status: Never Smoker     Smokeless tobacco: Never Used   Substance Use Topics     Alcohol use: No     Comment: Alcoholic Drinks/day: occ social drink     Drug use: No       Family History   I have reviewed this patient's family history and updated it with pertinent information if needed.  Family History   Problem Relation Age of Onset     Cancer Mother         skin     Alcoholism Mother      Hyperlipidemia Father      Breast Cancer Paternal Aunt      Breast Cancer Paternal Grandmother      Colon Cancer Maternal Grandmother      Cerebrovascular Disease Maternal Grandmother      Breast Cancer Paternal Aunt      Breast Cancer Paternal Aunt      Breast Cancer Paternal Cousin      Breast Cancer Paternal Cousin      Diabetes Other        Medications   I have reviewed this patient's current medications    Allergies   Allergies   Allergen Reactions     Grapefruit [Grapefruit Extract] Unknown     Morphine Other (See Comments)     Muscle cramps       Physical Exam   Vital Signs: Temp: 98.3  F (36.8  C)   BP: 122/84 Pulse: 76            Weight: 268 lbs 0 oz    Gen. appearance nontoxic  Eyes no conjunctivitis or icterus  Neck no stiffness or neck vein distention, no LN or tenderness  Oral no redness  Heart  No edema  Lungs breathing comfortably  Abdomen soft not tender  Extremities no synovitis, trace edema  Skin  no rash or emboli  Neurologic alert oriented no focal deficits    Data   Inflammatory Markers   Recent Labs   Lab Test 04/18/18  1014   CRP 0.6        Hematology Studies   Recent Labs   Lab Test 11/22/21  1134 11/13/19  1647 04/18/18  1014 04/06/18  1439 06/03/15  1724 05/29/15  1448   WBC 9.4  --  9.0 11.0 10.2 9.3   ANEU  --   --   --   --  6.8  --    AEOS  --   --   --   --  0.2  --    HGB 13.1 13.8 14.2 13.5 13.0 12.8   MCV 87  --  87 89 85 85     --  272 288 306 237       Metabolic Studies   Recent Labs   Lab Test 11/22/21  1134 04/18/18  1014 06/03/15  1724 05/29/15  1448   NA  141 141 140 137   POTASSIUM 4.5 4.2 3.6 3.9   CHLORIDE 105 108* 110* 105   CO2 26 22 22 26   BUN 11 15 11 8   CR 0.78 0.74 0.62 0.58   GFRESTIMATED >90 >60 >90  Non  GFR Calc   >90  Non  GFR Calc         Hepatic Studies    Recent Labs   Lab Test 11/22/21  1134 05/29/15  1448   BILITOTAL 0.8 1.0   ALKPHOS 90 77   ALBUMIN 3.6 3.6   AST 10 17   ALT 12 50       Most Recent 6 Bacteria Isolates From Any Culture (See EPIC Reports for Culture Details):No lab results found.    Urine Studies    Recent Labs   Lab Test 06/23/21  1914   LEUKEST Negative   WBCU None Seen       Vancomycin Levels  No lab results found.    Invalid input(s): VANCO    Hepatitis B Testing No lab results found.  Hepatitis C Testing   No results found for: HCVAB, HQTG, HCGENO, HCPCR, HQTRNA, HEPRNA  HIVTesting   Recent Labs   Lab Test 11/13/19  1647   HIAGAB Negative       Respiratory Virus Testing    No results found for: RS, FLUAG  COVID-19 Antibody Results, Testing for Immunity    COVID-19 Antibody Results, Testing for Immunity   No data to display.         COVID-19 PCR Results    COVID-19 PCR Results   No data to display.

## 2022-08-31 ENCOUNTER — LAB (OUTPATIENT)
Dept: LAB | Facility: CLINIC | Age: 32
End: 2022-08-31
Payer: COMMERCIAL

## 2022-08-31 DIAGNOSIS — R76.12 POSITIVE QUANTIFERON-TB GOLD TEST: ICD-10-CM

## 2022-08-31 DIAGNOSIS — Z11.59 NEED FOR HEPATITIS C SCREENING TEST: ICD-10-CM

## 2022-08-31 LAB — HCV AB SERPL QL IA: NONREACTIVE

## 2022-08-31 PROCEDURE — 86481 TB AG RESPONSE T-CELL SUSP: CPT

## 2022-08-31 PROCEDURE — 86803 HEPATITIS C AB TEST: CPT

## 2022-08-31 PROCEDURE — 36415 COLL VENOUS BLD VENIPUNCTURE: CPT

## 2022-09-02 LAB
QUANTIFERON NIL TUBE: 0.02 IU/ML
QUANTIFERON TB1 TUBE: 0.21 IU/ML
QUANTIFERON TB2 TUBE: 0.22

## 2022-09-04 LAB
GAMMA INTERFERON BACKGROUND BLD IA-ACNC: 0.02 IU/ML
M TB IFN-G BLD-IMP: NEGATIVE
M TB IFN-G CD4+ BCKGRND COR BLD-ACNC: 9.98 IU/ML
MITOGEN IGNF BCKGRD COR BLD-ACNC: 0.19 IU/ML
MITOGEN IGNF BCKGRD COR BLD-ACNC: 0.2 IU/ML
QUANTIFERON MITOGEN: 10 IU/ML

## 2022-09-08 ENCOUNTER — TELEPHONE (OUTPATIENT)
Dept: INFECTIOUS DISEASES | Facility: CLINIC | Age: 32
End: 2022-09-08

## 2022-09-08 NOTE — TELEPHONE ENCOUNTER
"Per Dr Ramirez called pt and informed below, she understands and has no questions.  Repeat quantifieron  negative,   Therefore hold off treatment for latent tb\" since it is possible the old test was a false positive (ie she does not have reallylatent tb)   Pls let pt know.     Any questions let me know.     "

## 2022-09-11 ENCOUNTER — HEALTH MAINTENANCE LETTER (OUTPATIENT)
Age: 32
End: 2022-09-11

## 2022-09-25 ENCOUNTER — MYC MEDICAL ADVICE (OUTPATIENT)
Dept: FAMILY MEDICINE | Facility: CLINIC | Age: 32
End: 2022-09-25

## 2022-09-25 DIAGNOSIS — K21.9 GASTROESOPHAGEAL REFLUX DISEASE WITHOUT ESOPHAGITIS: Primary | ICD-10-CM

## 2022-09-26 NOTE — TELEPHONE ENCOUNTER
Patient is requesting to restart omeprazole, but author is unable to see it on patient's active or historical medication list.    Routing to Dr. Valenzuela to review/advise.    Selena REED RN

## 2022-12-28 ENCOUNTER — NURSE TRIAGE (OUTPATIENT)
Dept: NURSING | Facility: CLINIC | Age: 32
End: 2022-12-28

## 2022-12-28 NOTE — TELEPHONE ENCOUNTER
"Nurse Triage SBAR    Is this a 2nd Level Triage? NO    Situation: Cervix pain, vaginal bleeding    Background: Patient calling, states that she has intercourse with her partner on Monday. States her last period stopped on 12/22/2022.     Today she developed \"pain and a pinching feeling\" in her cervix and is having some vaginal bleeding. Also states that her abdomen feels crampy. She says the bleeding is not as much as a normal period. She denies any unusual vaginal discharge, denies itching, denies rash. Patient states that she is not pregnant.     Assessment: Cervix pain, vaginal bleeding    Protocol Recommended Disposition:   See in Office Within 3 Days    Recommendation:     Patient would like to speak with someone from the clinic. Please contact her with any further recommendations.      Routed to provider     SCOTT LOVE RN      Does the patient meet one of the following criteria for ADS visit consideration? 16+ years old, with an [facility] PCP     TIP  Providers, please consider if this condition is appropriate for management at one of our Acute and Diagnostic Services sites.     If patient is a good candidate, please use dotphrase <dot>triageresponse and select Refer to ADS to document.    Reason for Disposition    Patient wants to be seen    Additional Information    Negative: Sounds like a life-threatening emergency to the triager    Negative: Followed a genital area injury (e.g., vagina, vulva)    Negative: Vaginal bleeding is main symptom    Negative: Pain or burning with passing urine (urination) is main symptom    Negative: Menstrual cramps is main symptom    Negative: Abdomen pain is main symptom    Negative: Pubic lice suspected    Negative: Itching or rash of external female genital area (vulva)    Negative: Vaginal discharge is main symptom    Negative: Labor suspected    Negative: Patient sounds very sick or weak to the triager    Negative: SEVERE pain and not improved 2 hours after pain " "medicine    Negative: Genital area looks infected (e.g., draining sore, spreading redness) and fever    Negative: Something is hanging out of the vagina and can't easily be pushed back inside    Negative: MILD-MODERATE pain and present > 24 hours  (Exception: Chronic pain.)    Negative: Genital area looks infected (e.g., draining sore, spreading redness)    Negative: Rash with painful tiny water blisters    Negative: MODERATE-SEVERE itching (i.e., interferes with school, work, or sleep)    Negative: Rash (e.g., redness, tiny bumps, sore) of genital area and present > 24 hours    Negative: Tender lump (swelling or \"ball\") at vaginal opening    Negative: Symptoms of a yeast infection (i.e., itchy, white discharge, not bad smelling) and not improved > 3 days following CARE ADVICE    Negative: Vaginal itching and not improved > 3 days following CARE ADVICE    Negative: Vaginal odor (bad smell) not improved > 3 days following CARE ADVICE    Negative: Patient is worried they have a sexually transmitted infection (STI)    Protocols used: VAGINAL SYMPTOMS-A-OH      "

## 2022-12-29 ENCOUNTER — OFFICE VISIT (OUTPATIENT)
Dept: FAMILY MEDICINE | Facility: CLINIC | Age: 32
End: 2022-12-29
Payer: COMMERCIAL

## 2022-12-29 VITALS
DIASTOLIC BLOOD PRESSURE: 78 MMHG | WEIGHT: 264.5 LBS | OXYGEN SATURATION: 98 % | TEMPERATURE: 98.9 F | BODY MASS INDEX: 45.4 KG/M2 | HEART RATE: 68 BPM | SYSTOLIC BLOOD PRESSURE: 110 MMHG

## 2022-12-29 DIAGNOSIS — N93.9 ABNORMAL VAGINAL BLEEDING: Primary | ICD-10-CM

## 2022-12-29 DIAGNOSIS — Z12.4 CERVICAL CANCER SCREENING: ICD-10-CM

## 2022-12-29 DIAGNOSIS — L30.9 ECZEMA, UNSPECIFIED TYPE: ICD-10-CM

## 2022-12-29 DIAGNOSIS — E66.01 MORBID OBESITY (H): ICD-10-CM

## 2022-12-29 DIAGNOSIS — Z11.3 SCREEN FOR STD (SEXUALLY TRANSMITTED DISEASE): ICD-10-CM

## 2022-12-29 DIAGNOSIS — R10.2 PELVIC PAIN IN FEMALE: ICD-10-CM

## 2022-12-29 PROCEDURE — 87624 HPV HI-RISK TYP POOLED RSLT: CPT | Performed by: FAMILY MEDICINE

## 2022-12-29 PROCEDURE — 87591 N.GONORRHOEAE DNA AMP PROB: CPT | Performed by: FAMILY MEDICINE

## 2022-12-29 PROCEDURE — G0145 SCR C/V CYTO,THINLAYER,RESCR: HCPCS | Performed by: FAMILY MEDICINE

## 2022-12-29 PROCEDURE — 99214 OFFICE O/P EST MOD 30 MIN: CPT | Performed by: FAMILY MEDICINE

## 2022-12-29 PROCEDURE — 87491 CHLMYD TRACH DNA AMP PROBE: CPT | Performed by: FAMILY MEDICINE

## 2022-12-29 RX ORDER — TRIAMCINOLONE ACETONIDE 1 MG/G
CREAM TOPICAL
Qty: 45 G | Refills: 3 | Status: SHIPPED | OUTPATIENT
Start: 2022-12-29

## 2022-12-29 ASSESSMENT — PATIENT HEALTH QUESTIONNAIRE - PHQ9
SUM OF ALL RESPONSES TO PHQ QUESTIONS 1-9: 7
10. IF YOU CHECKED OFF ANY PROBLEMS, HOW DIFFICULT HAVE THESE PROBLEMS MADE IT FOR YOU TO DO YOUR WORK, TAKE CARE OF THINGS AT HOME, OR GET ALONG WITH OTHER PEOPLE: SOMEWHAT DIFFICULT
SUM OF ALL RESPONSES TO PHQ QUESTIONS 1-9: 7

## 2022-12-29 NOTE — PROGRESS NOTES
Assessment & Plan     Abnormal vaginal bleeding  Cervix does bleed fairly easily with Pap smear indicating possible cervicitis.  Also consider differential diagnosis of possible endocervical polyp.  Recommend ultrasound for further evaluation.  Pap smear performed.  GC chlamydia swab also collected  - US Pelvic Complete with Transvaginal    Cervical cancer screening    - Pap Screen with HPV - recommended age 30 - 65 years    Eczema, unspecified type  Discussed importance of regular use of moisturizing cream such as CeraVe or Cetaphil.  Prescription for topical triamcinolone cream sent to pharmacy to use in combination with moisturizing cream.  Counseled her on use of medication  - triamcinolone (KENALOG) 0.1 % external cream  Dispense: 45 g; Refill: 3    Pelvic pain in female    - US Pelvic Complete with Transvaginal    Screen for STD (sexually transmitted disease)    - NEISSERIA GONORRHOEA PCR  - CHLAMYDIA TRACHOMATIS PCR    Morbid Obesity  She is working on healthy lifestyle changes             Depression Screening Follow Up    PHQ 12/29/2022   PHQ-9 Total Score 7   Q9: Thoughts of better off dead/self-harm past 2 weeks Several days   F/U: Thoughts of suicide or self-harm Yes   F/U: Self harm-plan No   F/U: Self-harm action No   F/U: Safety concerns No                 Follow Up    Follow Up Actions Taken  Crisis resource information provided in the After Visit Summary  Continue current medication    Discussed the following ways the patient can remain in a safe environment:  be around others      No follow-ups on file.    Cassy Valenzuela MD  [facility] OAKENRIQUE Garcia is a 32 year old, presenting for the following health issues:  vaginal bleeding (Started yesterday - period ended 12/22/22 )      HPI         She is seen today for concern of vaginal bleeding and cramping.  She finished her period on December 22.  She had intercourse on December 26.  She does have history of some light  spotting after intercourse.  Yesterday she developed heavier bleeding as well as intense, pinching cramping in the area of her cervix.  The cramping has continued.  She does continue to have some vaginal bleeding although it is darker red in appearance.  Today it was bright red.  She has had some itching/irritation on the right side of her vagina.  Has not had any vaginal discharge.  No concerns for STDs.  Uses condoms during intercourse.  She also would like to discuss eczema.  Has dry scaly patches on her hands, arms and legs.  These are itchy and irritated.  Review of Systems         Objective    /78 (BP Location: Right arm, Cuff Size: Adult Large)   Pulse 68   Temp 98.9  F (37.2  C) (Temporal)   Wt 120 kg (264 lb 8 oz)   LMP 12/17/2022   SpO2 98%   BMI 45.40 kg/m    Body mass index is 45.4 kg/m .  Physical Exam   GENERAL: healthy, alert and no distress   (female): normal female external genitalia, normal urethral meatus , vaginal mucosa pink, moist, well rugated and there is some erythema of the cervix and the cervix bleeds easily with Pap smear  PSYCH: mentation appears normal, affect normal/bright  Skin: There is eczema on both hands and arms.  Skin is dry in appearance

## 2022-12-30 ENCOUNTER — HOSPITAL ENCOUNTER (OUTPATIENT)
Dept: ULTRASOUND IMAGING | Facility: HOSPITAL | Age: 32
Discharge: HOME | End: 2022-12-30
Attending: FAMILY MEDICINE | Admitting: FAMILY MEDICINE
Payer: COMMERCIAL

## 2022-12-30 DIAGNOSIS — N93.9 ABNORMAL VAGINAL BLEEDING: ICD-10-CM

## 2022-12-30 DIAGNOSIS — R10.2 PELVIC PAIN IN FEMALE: ICD-10-CM

## 2022-12-30 LAB
C TRACH DNA SPEC QL NAA+PROBE: NEGATIVE
N GONORRHOEA DNA SPEC QL NAA+PROBE: NEGATIVE

## 2022-12-30 PROCEDURE — 76830 TRANSVAGINAL US NON-OB: CPT

## 2023-01-02 ENCOUNTER — TELEPHONE (OUTPATIENT)
Dept: FAMILY MEDICINE | Facility: CLINIC | Age: 33
End: 2023-01-02

## 2023-01-02 NOTE — TELEPHONE ENCOUNTER
----- Message from Cassy Valenzuela MD sent at 1/2/2023 12:35 PM CST -----  Your pelvic ultrasound is normal.  If your bleeding and discomfort continue, I would recommend you see a gynecologist for additional evaluation. Please let me know if you would like a referral

## 2023-01-03 LAB
BKR LAB AP GYN ADEQUACY: NORMAL
BKR LAB AP GYN INTERPRETATION: NORMAL
BKR LAB AP HPV REFLEX: NORMAL
BKR LAB AP LMP: NORMAL
BKR LAB AP PREVIOUS ABNORMAL: NORMAL
PATH REPORT.COMMENTS IMP SPEC: NORMAL
PATH REPORT.COMMENTS IMP SPEC: NORMAL
PATH REPORT.RELEVANT HX SPEC: NORMAL

## 2023-01-04 LAB
HUMAN PAPILLOMA VIRUS 16 DNA: NEGATIVE
HUMAN PAPILLOMA VIRUS 18 DNA: NEGATIVE
HUMAN PAPILLOMA VIRUS FINAL DIAGNOSIS: ABNORMAL
HUMAN PAPILLOMA VIRUS OTHER HR: POSITIVE

## 2023-01-05 ENCOUNTER — MYC MEDICAL ADVICE (OUTPATIENT)
Dept: FAMILY MEDICINE | Facility: CLINIC | Age: 33
End: 2023-01-05

## 2023-01-05 ENCOUNTER — PATIENT OUTREACH (OUTPATIENT)
Dept: FAMILY MEDICINE | Facility: CLINIC | Age: 33
End: 2023-01-05

## 2023-01-05 DIAGNOSIS — N93.9 ABNORMAL VAGINAL BLEEDING: Primary | ICD-10-CM

## 2023-01-05 DIAGNOSIS — R10.2 PELVIC PAIN IN FEMALE: ICD-10-CM

## 2023-01-05 PROBLEM — R87.810 CERVICAL HIGH RISK HPV (HUMAN PAPILLOMAVIRUS) TEST POSITIVE: Status: ACTIVE | Noted: 2023-01-05

## 2023-02-24 ENCOUNTER — TELEPHONE (OUTPATIENT)
Dept: FAMILY MEDICINE | Facility: CLINIC | Age: 33
End: 2023-02-24
Payer: COMMERCIAL

## 2023-02-24 NOTE — TELEPHONE ENCOUNTER
Reason for call:  FYI Patient reporting a symptom. Friend Dalia calling [phone]    Symptom or request:  Patients friend dalia is calling and she is calling on behalf of patient as patient is mentally not able to talk right now. patient is having passive SI (suicidal ideation) meaning not actively wanting to self harm. Having nightmares. Out of touch with reality. Wondering if she can get some medication to sleep. Has been having a hard time sleeping. Wondering if her meds need to be adjusted as well. Therapist is recommending sleep aid and evaluation. Friend is a psychologist. Doesn't agree with the evaluation. Patient is on lexapro which may be affecting her not getting sleep and having nightmares. Wondering what provider would recommend in regards to her lexapro. Patients friend and patient were texting back and forth as patients friend is in [place] and patient is in [place]. Didn't give out any pertinent information as had no authorization on file to speak to friend. But this was the information I got from friend    I did speak to Dr Ng to get someones input on it and he said if patient is not actively wanting to self harm, there is not a lot to go off of without an evaluation. Nurse triage would be best to try and/or ER if patient is actively wanting to self harm.     I did let the friend know that and an appointment should be made as soon as possible to talk about all this and any med changes that may or may not need to be made. But to start with nurse triage.    Duration (how long have symptoms been present): going on for a few days. Is getting worse.              Call taken on 2/24/2023 at 4:50 PM by Dolores Yanez

## 2023-02-25 ENCOUNTER — HOSPITAL ENCOUNTER (EMERGENCY)
Facility: CLINIC | Age: 33
Discharge: HOME | End: 2023-02-25
Attending: EMERGENCY MEDICINE | Admitting: EMERGENCY MEDICINE
Payer: COMMERCIAL

## 2023-02-25 VITALS
TEMPERATURE: 97.9 F | HEART RATE: 79 BPM | DIASTOLIC BLOOD PRESSURE: 88 MMHG | SYSTOLIC BLOOD PRESSURE: 113 MMHG | RESPIRATION RATE: 18 BRPM

## 2023-02-25 DIAGNOSIS — R45.851 SUICIDAL IDEATION: ICD-10-CM

## 2023-02-25 DIAGNOSIS — G47.9 DIFFICULTY SLEEPING: ICD-10-CM

## 2023-02-25 DIAGNOSIS — F41.9 ANXIETY: ICD-10-CM

## 2023-02-25 DIAGNOSIS — R45.89 DEPRESSED MOOD: ICD-10-CM

## 2023-02-25 PROCEDURE — 99285 EMERGENCY DEPT VISIT HI MDM: CPT | Mod: 25

## 2023-02-25 PROCEDURE — 90791 PSYCH DIAGNOSTIC EVALUATION: CPT

## 2023-02-25 RX ORDER — HYDROXYZINE PAMOATE 25 MG/1
25 CAPSULE ORAL 3 TIMES DAILY PRN
Qty: 30 CAPSULE | Refills: 0 | Status: SHIPPED | OUTPATIENT
Start: 2023-02-25 | End: 2023-06-08

## 2023-02-25 ASSESSMENT — COLUMBIA-SUICIDE SEVERITY RATING SCALE - C-SSRS
6. HAVE YOU EVER DONE ANYTHING, STARTED TO DO ANYTHING, OR PREPARED TO DO ANYTHING TO END YOUR LIFE?: NO
4. HAVE YOU HAD THESE THOUGHTS AND HAD SOME INTENTION OF ACTING ON THEM?: NO
2. HAVE YOU ACTUALLY HAD ANY THOUGHTS OF KILLING YOURSELF?: YES
2. HAVE YOU ACTUALLY HAD ANY THOUGHTS OF KILLING YOURSELF?: YES
1. HAVE YOU WISHED YOU WERE DEAD OR WISHED YOU COULD GO TO SLEEP AND NOT WAKE UP?: NO
4. HAVE YOU HAD THESE THOUGHTS AND HAD SOME INTENTION OF ACTING ON THEM?: NO
3. HAVE YOU BEEN THINKING ABOUT HOW YOU MIGHT KILL YOURSELF?: YES
ATTEMPT LIFETIME: NO
5. HAVE YOU STARTED TO WORK OUT OR WORKED OUT THE DETAILS OF HOW TO KILL YOURSELF? DO YOU INTEND TO CARRY OUT THIS PLAN?: NO
TOTAL  NUMBER OF ABORTED OR SELF INTERRUPTED ATTEMPTS LIFETIME: NO
REASONS FOR IDEATION PAST MONTH: MOSTLY TO END OR STOP THE PAIN (YOU COULDN'T GO ON LIVING WITH THE PAIN OR HOW YOU WERE FEELING)
REASONS FOR IDEATION LIFETIME: MOSTLY TO END OR STOP THE PAIN (YOU COULDN'T GO ON LIVING WITH THE PAIN OR HOW YOU WERE FEELING)
5. HAVE YOU STARTED TO WORK OUT OR WORKED OUT THE DETAILS OF HOW TO KILL YOURSELF? DO YOU INTEND TO CARRY OUT THIS PLAN?: NO
TOTAL  NUMBER OF INTERRUPTED ATTEMPTS LIFETIME: NO

## 2023-02-25 ASSESSMENT — ACTIVITIES OF DAILY LIVING (ADL)
ADLS_ACUITY_SCORE: 35
ADLS_ACUITY_SCORE: 35

## 2023-02-25 NOTE — Clinical Note
Radha Dickerson was seen and treated in our emergency department on 2/25/2023.  She may return to work on 03/06/2023.       If you have any questions or concerns, please don't hesitate to call.      Jes Simental MD

## 2023-02-25 NOTE — ED TRIAGE NOTES
"Pt presents to the ED with c/o mental health problem, and possible medication reaction. Pt reports that she had an appointment with her therapist yesterday, and was told to come into the ED. Pt reports that the therapist thinks that there may be a reaction to the medication. Pt endorses feeling more down, and increasing suicidal ideation. Pt states \"this is the worst its been\". Pt reports dealing with depression for the past three years. Pt denies any specific suicide plan, has never attempted. Pt here with christofer.      Triage Assessment     Row Name 02/25/23 [id number]       Triage Assessment (Adult)    Airway WDL WDL       Respiratory WDL    Respiratory WDL WDL       Skin Circulation/Temperature WDL    Skin Circulation/Temperature WDL WDL       Cardiac WDL    Cardiac WDL WDL       Peripheral/Neurovascular WDL    Peripheral Neurovascular WDL WDL       Cognitive/Neuro/Behavioral WDL    Cognitive/Neuro/Behavioral WDL WDL              "

## 2023-02-25 NOTE — Clinical Note
Jayla was seen and treated in our emergency department on 2/25/2023.  She may return to school on 03/06/2023.      If you have any questions or concerns, please don't hesitate to call.      Jes Simental MD

## 2023-02-25 NOTE — ED PROVIDER NOTES
"EMERGENCY DEPARTMENT ENCOUNTER      NAME: Radha Dickerson  YOB: 1990  MRN: [id number]    FINAL IMPRESSION  1. Depressed mood    2. Suicidal ideation    3. Difficulty sleeping    4. Anxiety        MEDICAL DECISION MAKING   Pertinent Labs & Imaging studies reviewed. (See chart for details)    Radha Dickerson is a 32 year old female who presents for evaluation of depressed mood, passive SI, \"brain fog\" and generally worsening mental health over the last few weeks. She follows with a therapist and has been working with that provider for the last 10 years. She reports she met with her therapist yesterday (scheduled an extra appointment because she was feeling her mood worsening) and her provider encouraged her to be seen. She and her therapist both wonder if perhaps the antidepressant she's been on for the last ~12 years isn't working. Patient reports she has been having vague thoughts and dreams about \"escaping the pain\" but also does not want to hurt herself and has no plans or history of suicide attempts. She believes opening up to one of her friends about past trauma may have triggered her current episode but states that it seems like she has worsening mood every ~2 months and was hopeful that her antidepressants would prevent these recurrences. Patient states that she has been experiencing a \"pressure\" sensation in the right side of her forehead as well as \"brain fog like a concussion.\" She has not been sleeping well, has had a loss of appetite and concentration, and generally feeling down. Patient states that she cannot remember the last time her mood felt this low, which is what prompted her to come in today. Physically, she has no complaints outside of the pressure in her head (which she does not describe as pain). She denies drug or alcohol use, HI, VH, AH. Remainder of history and exam, as below.     I considered a broad differential including but is not limited to decompensation of previously " diagnosed psychiatric disorder, social stressors, substance abuse/intoxication/withdrawal, medication non-compliance. With regards to headache and brain fog, considered tension headache, migraine, occular migraine, occipital headache, post-concussive syndrome. She has no s/s to suggest GCA, traumatic bleed, meningitis, SAH/SDH or other acute process requiring imaging. There are also no s/s on exam or by history to suggest infection, trauma/intracranial pathology, electrolyte derangement, or other acute medical process requiring emergent workup/imaging at this time. Will plan to have patient assessed by social work team for assistance in obtaining collateral and determining appropriate disposition. I offered analgesic or anxiolytic but patient declined for now and will update me if she changes her mind.     Patient was evaluated by DEC  who obtained additional information and collateral. Please see that note for details. We discussed history, presenting complaints/symptoms, and options for management. At this point, we have agreed that patient does not meet criteria for inpatient admission and likely would not benefit from hospitalization. Will plan to discharge home with resources for outpatient follow up including contacts for mental health professionals as well as plan for admission to day program. Will also send with a prescription for vistaril to use as needed for anxiety/sleep until she is able to meet with a provider to discuss switching long-term medications (antidepressant). She has been able to contract for safety and felt encouraged by plan, eager to go home. I again offered analgesic or interventions for headache symptoms but she declined.     Strict return precautions and follow up recommendations were discussed and all questions were answered. Patient and her fiance endorsed understanding and was in agreement with plan.      Medical Decision Making    History:    Supplemental history from:  Documented in chart, if applicable    External Record(s) reviewed: Documented in chart, if applicable.    Work Up:    Chart documentation includes differential considered and any EKGs or imaging independently interpreted by provider, where specified.    In additional to work up documented, I considered the following work up: Documented in chart, if applicable.    External consultation:    Discussion of management with another provider: Documented in chart, if applicable    Complicating factors:    Care impacted by chronic illness: Mental Health    Care affected by social determinants of health: N/A    Disposition considerations: Discharge. I prescribed additional prescription strength medication(s) as charted. See documentation for any additional details.          ED COURSE  5:30 PM I met with the patient to gather history and to perform my initial exam. We discussed plans for the ED course, including diagnostic testing and treatment.   8:37 PM I spoke with the DEC accessor.  8:39 PM I rechecked and updated the patient with DEC accessor consult results. We discussed plans for discharge including supportive cares, symptomatic treatment, outpatient follow up, and reasons to return to the emergency department.    MEDICATIONS GIVEN IN THE ED  Medications - No data to display    NEW PRESCRIPTIONS STARTED AT TODAY'S VISIT  Discharge Medication List as of 2/25/2023  9:24 PM      START taking these medications    Details   hydrOXYzine (VISTARIL) 25 MG capsule Take 1 capsule (25 mg) by mouth 3 times daily as needed for itching, Disp-30 capsule, R-0, Local Print                =================================================================    Chief Complaint   Patient presents with     Mental Health Problem         HPI:    Patient information was obtained from: patient     Use of : N/A    Radha Dickerson is a 32 year old female who presents with worsening mental health issues for past few weeks. Her symptoms  "include thoughts of hopelessness, nightmares, depression, loss in appetite, trouble concentrating, feeling \"burnt out\" and \"foggy, like [she] hasn't slept in a week.\" Patient also endorses wanting to \"escape the pain,\" but states that she does not want to hurt her self and does not have a plan at this time. She states that she understands that she is valued and loved and denies a past medical history of self harm or suicide attempt. Patient says that she is sleeping, but that she's \"not getting restful sleep.\" She says that today is the best that she has felt in the past week.     Patient reports onset of symptoms last week after opening up to a friend about her traumatic past. Patient also notes she drank about half a can of alcohol at this time. Patient does not drink very often and states she has one drink per month and that this hasn't affected her mental health in the past. She was evaluated by her therapist of 10 years yesterday for depression, which she states helped. Her therapist suggested an immediate evaluation due to concern for potential medication interference with her acid reflux medication and antidepressants. Patient has been on this medication for the past 1.5-2 years, yet she states she does not feel as if it is effective, and continues to endorse frequent depressive episodes while on this prescription. Patient has had her dose of LEXAPRO increased from 10 mg to 20 mg, with no relief.     Patient reports a past medical history of PTSD, anxiety, and depression. Of note, patient had pink eye 2 weeks ago. She drinks about once per month, but denies any drug use.    Patient denies suicidal ideation or hallucinations. No other medical concerns are expressed at this time.     Mental Health Risk Assessment      PSS-3    Date and Time Over the past 2 weeks have you felt down, depressed, or hopeless? Over the past 2 weeks have you had thoughts of killing yourself? Have you ever attempted to kill yourself? " When did this last happen? User   02/25/23 1638 yes yes no -- EFS      C-SSRS ([place])    Date and Time Q1 Wished to be Dead (Past Month) Q2 Suicidal Thoughts (Past Month) Q3 Suicidal Thought Method Q4 Suicidal Intent without Specific Plan Q5 Suicide Intent with Specific Plan Q6 Suicide Behavior (Lifetime) Within the Past 3 Months? RETIRED: Level of Risk per Screen Screening Not Complete User   02/25/23 1922 yes yes no yes no no -- -- -- AB   02/25/23 1638 yes yes no yes no no -- -- -- EFS              Suicide assessment completed by mental health (D.E.C., LCSW, etc.)      RELEVANT HISTORY, MEDICATIONS, & ALLERGIES   Past medical history, surgical history, family history, medications, and allergies reviewed and pertinent noted in HPI.    REVIEW OF SYSTEMS:  A complete review of systems was performed with pertinent positives and negatives noted in the HPI. All other systems negative.     PHYSICAL EXAM:    Vitals: /88   Pulse 79   Temp 97.9  F (36.6  C) (Temporal)   Resp 18   LMP 02/23/2023 (Exact Date)    General: Alert and interactive, comfortable appearing.  HENT: Atraumatic. Full AROM of neck. Conjunctiva clear. No temporal tenderness. EOMs intact. No tenderness to palpation of scalp or facial bones.   Cardiovascular: Regular rate and rhythm.   Chest/Pulmonary: Normal work of breathing. Speaking in complete sentences.   Extremities: Normal AROM of all major joints.  Skin: Warm and dry. Normal skin color.   Neuro: Speech clear. CNs grossly intact. Moves all extremities spontaneously.   Psychiatric: Mood depressed per patient. Affect flat, congruent with mood. Eye contact intact. Does not appear to be responding to internal stimuli. Thought process and content organized, no delusions. Speech normal, not tangential or pressured. Grooming well-kempt. Endorses passive suicidal ideation without plan. Denies homicidal ideation, visual hallucinations, or auditory hallucinations.      IMillie, am  serving as a scribe to document services personally performed by Dr. Jes Simental based on my observation and the provider's statements to me. I, Jes Simental MD attest that Millie Sarabia is acting in a scribe capacity, has observed my performance of the services and has documented them in accordance with my direction.    Jes Simental M.D.  Emergency Medicine  [facility] EMERGENCY ROOM  [address] [address]  [phone]  Dept: [phone]       Jes Simental MD  02/26/23 1935

## 2023-02-26 NOTE — DISCHARGE INSTRUCTIONS
You were seen in the Emergency Department today for depressed mood and anxiety.     Please use the resources and plan that you developed today with the social work team. Someone should be calling to get you set up with the day program and psychiatry but if you don't hear from them you can call the numbers we are sending.     Continue to take your antidepressant medications as prescribed until you talk to the doctors about switching or adding something new. You can also use the hydroxyzine (vistaril) as needed for anxiety. This will probably make you sleepy so I would recommend you take it at night to start with.         Aftercare Plan  If I am feeling unsafe or I am in a crisis, I will:   Contact my established care providers   Call the National Suicide Prevention Lifeline: 988  Go to the nearest emergency room   Call 911 Warning signs that I or other people might notice when a crisis is developing for me: An increase in anxiety/depression symptoms, becoming more apathetic or down. Things I am able to do on my own to cope or help me feel better: Continue to engage in outpatient therapy. Follow through with referrals from today's ED visit. Things that I am able to do with others to cope or help me better: Do something you enjoy with a friend such as grab coffee or lunch. If you feel like talking about how your feeling find a trusted support person and discuss what your experiencing.   Things I can use or do for distraction: Movies, puzzles, getting outdoors for fresh air.   Changes I can make to support my mental health and wellness: Maintain a regular routine that includes adequate sleep of 7-9 hours each night, regular nutritious meals, and exercise most days of the week. Take all medications as prescribed. People in my life that I can ask for help: Shruti, therapist, your primary care provider. Your [facility] has a mental health crisis team you can call 24/7: [facility]  [phone] (adults)   "[phone] (children) Other things that are important when I'm in crisis: Follow the crisis information included with your discharge information. Put the safety plan recommendations discussed from today's visit in place. Return to the ED if needed.   Crisis Lines  Crisis Text Line  Text 475780  You will be connected with a trained live crisis counselor to provide support. Por espanol, texto  LISET a 181649 o texto a 442-AYUDAME en WhatsApp [employer] (LGBTQ Youth Crisis Line)  8.774.337.9194  text START to 105-088   [employer]  Fast Tracker  Linking people to mental health and substance use disorder resources  [facility].whoactually  [place] Mental Health Warm Line  Peer to peer support  Monday thru Saturday, 12 pm to 10 pm  552.201.2848 or 8.776.775.5227  Text \"Support\" to [address]93 [employer] [place] [employer] Mental Illness (SCOTT)  206.006.9732 or 1.880.SCOTT.HELPS   Mental Health Apps  My3  https://[employer].org/ VirtualHopeBox  https://[employer]/apps/virtual-hope-box/   Additional Information  Today you were seen by a licensed mental health professional through Triage and Transition services, [facility] ([facility])  for a crisis assessment in the Emergency Department at [facility].  It is recommended that you follow up with your established providers (psychiatrist, mental health therapist, and/or primary care doctor - as relevant) as soon as possible. Coordinators from [facility] will be calling you in the next 24-48 hours to ensure that you have the resources you need.  You can also contact [facility] coordinators directly at [phone]. You may have been scheduled for or offered an appointment with a mental health provider. [facility] maintains an extensive network of licensed behavioral health providers to connect patients with the services they need.  We do not charge providers a fee to participate in our referral network.  We match patients with providers based on a patient's specific " needs, insurance coverage, and location.  Our first effort will be to refer you to a provider within your care system, and will utilize providers outside your care system as needed.    Suicide Prevention Amgi for Android and iPhone - Smava3 is under construction          For Pain and/or Fever:  - You can take 600mg of Ibuprofen (Motrin, Advil) by mouth with food every 6-8 hours (no more than 3200mg in 24hrs).    - You may also take 650-1000mg of Acetaminophen (Tylenol) along with the Ibuprofen.  Please do not use more than 3000 mg in a 24 hour period. Tylenol is an effective drug when taken at the prescribed dosages but can cause bodily injury including liver damage if taken too often or at too high of dose.  - You can take one or the other every 3 hours while awake (such that each is taken every 6 hours). For example, if you take Tylenol when you get home then you would take ibuprofen 3 hours later followed by another dose of Tylenol 3 hours after that. Write down the times you are taking both medications to ensure appropriate time in between doses.     Please return to the ER if you experience thoughts of wanting to hurt yourself and/or for any other new or concerning symptoms, otherwise please follow up with your primary doctor and use the resources you were given from social work team.     Below is some information you might find useful.     Thank you for choosing [facility]. It was a pleasure taking care of you today!  -Dr. Jes Simental
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
## 2023-03-03 ENCOUNTER — OFFICE VISIT (OUTPATIENT)
Dept: FAMILY MEDICINE | Facility: CLINIC | Age: 33
End: 2023-03-03
Payer: COMMERCIAL

## 2023-03-03 VITALS
HEIGHT: 64 IN | HEART RATE: 73 BPM | OXYGEN SATURATION: 97 % | SYSTOLIC BLOOD PRESSURE: 116 MMHG | WEIGHT: 269.6 LBS | RESPIRATION RATE: 16 BRPM | TEMPERATURE: 98 F | DIASTOLIC BLOOD PRESSURE: 84 MMHG | BODY MASS INDEX: 46.03 KG/M2

## 2023-03-03 DIAGNOSIS — F32.2 SEVERE MAJOR DEPRESSION (H): Primary | ICD-10-CM

## 2023-03-03 PROCEDURE — 96127 BRIEF EMOTIONAL/BEHAV ASSMT: CPT | Performed by: FAMILY MEDICINE

## 2023-03-03 PROCEDURE — 99213 OFFICE O/P EST LOW 20 MIN: CPT | Performed by: FAMILY MEDICINE

## 2023-03-03 RX ORDER — BUPROPION HYDROCHLORIDE 150 MG/1
TABLET ORAL
COMMUNITY
Start: 2023-03-01 | End: 2023-06-08

## 2023-03-03 ASSESSMENT — PAIN SCALES - GENERAL: PAINLEVEL: NO PAIN (0)

## 2023-03-03 ASSESSMENT — PATIENT HEALTH QUESTIONNAIRE - PHQ9
SUM OF ALL RESPONSES TO PHQ QUESTIONS 1-9: 20
10. IF YOU CHECKED OFF ANY PROBLEMS, HOW DIFFICULT HAVE THESE PROBLEMS MADE IT FOR YOU TO DO YOUR WORK, TAKE CARE OF THINGS AT HOME, OR GET ALONG WITH OTHER PEOPLE: EXTREMELY DIFFICULT
SUM OF ALL RESPONSES TO PHQ QUESTIONS 1-9: 20
SUM OF ALL RESPONSES TO PHQ QUESTIONS 1-9: 20

## 2023-03-03 NOTE — PROGRESS NOTES
"  Assessment & Plan     Severe major depression (H)  She is doing better.  Has good support system in place.  We will continue to see her therapist on a regular basis.  Is planning to start day treatment program.  Has had some medication changes through psychiatry and will follow-up with psychiatry in a few weeks.             MED REC REQUIRED  Post Medication Reconciliation Status: discharge medications reconciled, continue medications without change  BMI:   Estimated body mass index is 46.28 kg/m  as calculated from the following:    Height as of this encounter: 1.626 m (5' 4\").    Weight as of this encounter: 122.3 kg (269 lb 9.6 oz).       Depression Screening Follow Up    PHQ 3/3/2023   PHQ-9 Total Score 20   Q9: Thoughts of better off dead/self-harm past 2 weeks Several days   F/U: Thoughts of suicide or self-harm Yes   F/U: Self harm-plan No   F/U: Self-harm action No   F/U: Safety concerns No                 Follow Up    Follow Up Actions Taken  Crisis resource information provided in the After Visit Summary  Referred patient back to mental health provider    Discussed the following ways the patient can remain in a safe environment:  be around others      No follow-ups on file.    Cassy Valenzuela MD  [facility] OAKENRIQUE Garcia is a 32 year old, presenting for the following health issues:  ER follow up (2-25-23 [facility])      HPI         She is seen today for emergency department follow-up visit.  Was seen in the emergency department on February 25 for depressed mood, suicidal ideation, anxiety and difficulty sleeping.  She has history of depression.  Has been taking antidepressant medication for several years.  She has also been seeing a therapist for several years.  Has been experiencing worsening of mental health over the past few weeks.  Symptoms include increase in depressed mood, brain fog and passive suicidal ideation.  She cannot really attribute anything to " "cause any symptoms.  She is currently in graduate school but does not feel that the semester has been overly stressful.  Her therapist encouraged her to be seen in the emergency department.  She was evaluated and ultimately felt safe to go home.  Was given referrals for outpatient psychiatry as well as for a day treatment program.    She reports today that she is feeling a bit better.  She was seen by a psychiatrist on Wednesday.  Her dose of escitalopram was increased to 30 mg daily.  She was also started on Wellbutrin 150 mg daily.  She is sleeping a little bit better.  She was prescribed hydroxyzine by the ER physician.  Does not really want to take that medication along with her other medications.  Also really did not notice that it was very helpful after taking 1 capsule.  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  She may have paperwork that will need to be completed to support her disability.  She did have intake for the day treatment program and is hoping to start that but needs to coordinate this with her schooling as she does not want to have to postpone her planned graduation this spring.  Review of Systems         Objective    /84   Pulse 73   Temp 98  F (36.7  C) (Oral)   Resp 16   Ht 1.626 m (5' 4\")   Wt 122.3 kg (269 lb 9.6 oz)   LMP 02/23/2023 (Exact Date)   SpO2 97%   BMI 46.28 kg/m    Body mass index is 46.28 kg/m .  Physical Exam   GENERAL: healthy, alert and no distress  PSYCH: mentation appears normal, affect normal/bright                    Answers for HPI/ROS submitted by the patient on 3/3/2023  If you checked off any problems, how difficult have these problems made it for you to do your work, take care of things at home, or get along with other people?: Extremely difficult  PHQ9 TOTAL SCORE: 20      "

## 2023-04-22 ENCOUNTER — NURSE TRIAGE (OUTPATIENT)
Dept: NURSING | Facility: CLINIC | Age: 33
End: 2023-04-22
Payer: COMMERCIAL

## 2023-04-22 NOTE — TELEPHONE ENCOUNTER
Sudden eye pain in left eye.  Some blurred vision.  Depth perception seems to be off.  No injury.  Started about 10 a.m. today.  Light sensitivity  Movement is painful  Feels like it's on the top of her eyeball  Eye is red.  Tender to the touch.  She also has a headache.    6/10 pain scale.    Per the protocol I instructed she be seen now in an ER.  Caller stated understanding and agreement.  Will go to [facility]'s ER, now.  She has a .    Reason for Disposition    Complete loss of vision in one or both eyes    Additional Information    Negative: Followed an eye injury    Negative: Eye pain from chemical in the eye    Negative: Eye pain from foreign body in eye    Negative: [1] Tender, red lump or pimple AND [2] located along the eyelid margin    Negative: Has sinus pain or pressure    Negative: Severe eye pain    Protocols used: EYE PAIN-TODD-WILFREDO HENRY RN [facility]

## 2023-04-23 ENCOUNTER — HOSPITAL ENCOUNTER (EMERGENCY)
Facility: CLINIC | Age: 33
Discharge: HOME | End: 2023-04-23
Admitting: PHYSICIAN ASSISTANT
Payer: COMMERCIAL

## 2023-04-23 VITALS
WEIGHT: 270 LBS | SYSTOLIC BLOOD PRESSURE: 138 MMHG | RESPIRATION RATE: 16 BRPM | DIASTOLIC BLOOD PRESSURE: 90 MMHG | TEMPERATURE: 98 F | HEIGHT: 64 IN | HEART RATE: 83 BPM | BODY MASS INDEX: 46.1 KG/M2 | OXYGEN SATURATION: 98 %

## 2023-04-23 DIAGNOSIS — H57.89 REDNESS OF LEFT EYE: ICD-10-CM

## 2023-04-23 DIAGNOSIS — H53.142 PHOTOPHOBIA OF LEFT EYE: ICD-10-CM

## 2023-04-23 PROCEDURE — 99283 EMERGENCY DEPT VISIT LOW MDM: CPT

## 2023-04-23 RX ORDER — TETRACAINE HYDROCHLORIDE 5 MG/ML
1 SOLUTION OPHTHALMIC ONCE
Status: DISCONTINUED | OUTPATIENT
Start: 2023-04-23 | End: 2023-04-23 | Stop reason: HOSPADM

## 2023-04-23 ASSESSMENT — ENCOUNTER SYMPTOMS
CHEST TIGHTNESS: 0
EYE DISCHARGE: 0
FEVER: 0
SHORTNESS OF BREATH: 0
EYE REDNESS: 1
EYE ITCHING: 0
EYE PAIN: 1
PHOTOPHOBIA: 1
CHILLS: 0
FATIGUE: 0

## 2023-04-23 ASSESSMENT — ACTIVITIES OF DAILY LIVING (ADL): ADLS_ACUITY_SCORE: 33

## 2023-04-23 ASSESSMENT — VISUAL ACUITY: OU: 1

## 2023-04-23 NOTE — DISCHARGE INSTRUCTIONS
You were seen here in the emergency department for evaluation of eye redness.  Your examination here was fairly reassuring.  No evidence of any visual field deficits.  Please call [facility] in the morning, for follow-up.  They should be able to get from the clinic for further evaluation.  I suspect this is possibly uveitis, however the ophthalmology doctors will confirm this.  Your eye pressures here were normal, no evidence of any foreign body, certainly no evidence of focal infection.  For symptoms, I recommend ibuprofen or Tylenol over the next day until he sees the eye doctor.    For pain or fever you may use:  -Tylenol 650 mg every 6 hours.  Max 4000 mg in 24 hours  Do not use thismedication with alcohol as it can cause liver problems.  -Ibuprofen 600 mg every 6 hours.  Max 3500 mg in 24 hours  Do not take this medication if you have a history of a GI bleed or have kidney problems.  You may use both of these medications at the same time or you can alternate them every 3 hours.  For example, Tylenol at 6 AM, ibuprofen at 9 AM, Tylenol at noon, etc.

## 2023-04-23 NOTE — ED TRIAGE NOTES
The patient presents to the ED with left eye redness, discomfort and light sensitivity that began yesterday. She denies injury or foreign body. History of migraines. The patient reports the pain is worse when she moves her eye up, down, left or right. Denies drainage from the eye.

## 2023-04-23 NOTE — ED PROVIDER NOTES
EMERGENCY DEPARTMENT ENCOUNTER      NAME: Radha Dickerson  AGE: 32 year old female  YOB: 1990  MRN: [id number]  EVALUATION DATE & TIME: No admission date for patient encounter.    PCP: Cassy Valenzuela    ED PROVIDER: Hector Myers PA-C      Chief Complaint   Patient presents with     Eye Problem         FINAL IMPRESSION:  1. Redness of left eye    2. Photophobia of left eye      ED COURSE & MEDICAL DECISION MAKING:    Pertinent Labs & Imaging studies reviewed. (See chart for details)  12:22 PM I met the patient and performed my initial interview and exam.   1302 PM Patient to be discharged. Will follow with eye tomorrow.     32 year old female presents to the Emergency Department for evaluation of left eye pain, redness.     ED Course as of 04/23/23 1329   Sun Apr 23, 2023   1302 Patient is a 32-year-old female, no significant past medical history, presents emergency department for evaluation of left eye redness, discomfort.  This all began yesterday.  Called the nurse line.  No injury or foreign body.  No recent trauma.  No fevers.  On examination here, she does have some injection to the left eye, no focal visual field deficits, some pain with extraocular eye movement.  Exam overall seems consistent with possible uveitis.  We will refer her to ophthalmology for further examination and treatment of this.  Given the number for [facility] for acute follow-up.  No evidence of intraocular hypertension, or acute closed angle glaucoma.  Eye pressures here were normal.  Visual acuity here shows right eye 20/30, left eye 20/50, however the patient has not wearing her glasses.  Fluorescein staining here did not reveal any focal uptake, or drainage in the eye.  Posterior examination with ophthalmoscope shows no retinal hemorrhage, or nicking.  Certainly presentation here does not seem consistent with orbital cellulitis, or abscess formation.  We will have her follow-up with the ophthalmology for  further evaluation, intervention.  Patient is aware of the plan.  Plan for discharge at this time.  Given the number for sample on 8 call in the morning, and also given a referral to [facility] ophthalmology.  She is agreeable with this plan.  Plan for discharge at this time.     Medical Decision Making    History:    Supplemental history from: Documented in chart, if applicable    External Record(s) reviewed: Outpatient Record: Nurse Line call on 04/22/2023    Work Up:    Chart documentation includes differential considered and any EKGs or imaging independently interpreted by provider, where specified.    In additional to work up documented, I considered the following work up: Documented in chart, if applicable.    External consultation:    Discussion of management with another provider: Documented in chart, if applicable    Complicating factors:    Care impacted by chronic illness: N/A    Care affected by social determinants of health: N/A    Disposition considerations: Discharge. No recommendations on prescription strength medication(s). N/A.       At the conclusion of the encounter I discussed the results of all of the tests and the disposition. The questions were answered. The patient or family acknowledged understanding and was agreeable with the care plan.       0 minutes of critical care time     MEDICATIONS GIVEN IN THE EMERGENCY:  Medications   tetracaine (PONTOCAINE) 0.5 % ophthalmic solution 1 drop (has no administration in time range)   fluorescein (FUL-EILEEN) ophthalmic strip 1 strip (has no administration in time range)       NEW PRESCRIPTIONS STARTED AT TODAY'S ER VISIT  Discharge Medication List as of 4/23/2023  1:16 PM             =================================================================    HPI    Patient information was obtained from: Patient     Use of : N/A        Radha Dickerson is a 32 year old female with a pertinent history of obesity, who presents to this ED  for evaluation  of eye pain.  Patient reports that this started yesterday.  Left eye redness, discomfort, light sensitivity began yesterday.  History of migraines, denies any injury or foreign body.  Patient reports that the pain is worse when she moves her eye up or down, denies any drainage from the eye.  Denies any fever, cough, cold, chills.  No foreign body sensation.  Does not wear contacts.  No numbness or tingling.  No headaches, lightheadedness, or other focal symptoms.      REVIEW OF SYSTEMS   Review of Systems   Constitutional: Negative for chills, fatigue and fever.   Eyes: Positive for photophobia, pain and redness. Negative for discharge, itching and visual disturbance.   Respiratory: Negative for chest tightness and shortness of breath.    All other systems reviewed and are negative.       PAST MEDICAL HISTORY:  Past Medical History:   Diagnosis Date     Anxiety      Ectopic pregnancy      Mild major depression (H) 11/16/2019     Morbid obesity (H) 7/11/2019       PAST SURGICAL HISTORY:  Past Surgical History:   Procedure Laterality Date     TONSILLECTOMY             CURRENT MEDICATIONS:    albuterol (PROAIR HFA/PROVENTIL HFA/VENTOLIN HFA) 108 (90 Base) MCG/ACT inhaler  buPROPion (WELLBUTRIN XL) 150 MG 24 hr tablet  escitalopram (LEXAPRO) 20 MG tablet  fluocinonide (LIDEX) 0.05 % external solution  hydrOXYzine (VISTARIL) 25 MG capsule  omeprazole (PRILOSEC) 20 MG DR capsule  triamcinolone (KENALOG) 0.1 % external cream         ALLERGIES:  Allergies   Allergen Reactions     Grapefruit [Grapefruit Extract] Unknown     Morphine Other (See Comments)     Muscle cramps       FAMILY HISTORY:  Family History   Problem Relation Age of Onset     Cancer Mother         skin     Alcoholism Mother      Hyperlipidemia Father      Breast Cancer Paternal Aunt      Breast Cancer Paternal Grandmother      Colon Cancer Maternal Grandmother      Cerebrovascular Disease Maternal Grandmother      Breast Cancer Paternal Aunt      Breast  "Cancer Paternal Aunt      Breast Cancer Paternal Cousin      Breast Cancer Paternal Cousin      Diabetes Other        SOCIAL HISTORY:   Social History     Socioeconomic History     Marital status: Single   Tobacco Use     Smoking status: Never     Smokeless tobacco: Never   Vaping Use     Vaping status: Never Used   Substance and Sexual Activity     Alcohol use: No     Comment: Alcoholic Drinks/day: occ social drink     Drug use: No     Sexual activity: Yes     Partners: Male     Birth control/protection: Condom       VITALS:  BP (!) 138/90   Pulse 83   Temp 98  F (36.7  C) (Temporal)   Resp 16   Ht 1.626 m (5' 4\")   Wt 122.5 kg (270 lb)   LMP 02/23/2023 (Exact Date)   SpO2 98%   BMI 46.35 kg/m      PHYSICAL EXAM    Physical Exam  Vitals and nursing note reviewed.   Constitutional:       General: She is not in acute distress.     Appearance: Normal appearance. She is normal weight. She is not toxic-appearing or diaphoretic.   HENT:      Right Ear: External ear normal.      Left Ear: External ear normal.   Eyes:      General: Lids are normal. Vision grossly intact. No visual field deficit.        Right eye: No foreign body, discharge or hordeolum.         Left eye: No foreign body, discharge or hordeolum.      Conjunctiva/sclera: Conjunctivae normal.      Pupils: Pupils are equal, round, and reactive to light.      Visual Fields: Right eye visual fields normal and left eye visual fields normal.      Comments: Redness present to the left eye, no drainage noted. No nystagmus, no exudate.  No evidence of hemorrhage.  Some pain with extraocular eye movement.  No visual field deficits.    Visual acuity 20/30 right  Acuity 20/50 left, patient not wearing glasses throughout exam     Neurological:      Mental Status: She is alert. Mental status is at baseline.        LAB:  All pertinent labs reviewed and interpreted.  Labs Ordered and Resulted from Time of ED Arrival to Time of ED Departure - No data to " display    RADIOLOGY:  Reviewed all pertinent imaging. Please see official radiology report.  No orders to display     PROCEDURES:     PROCEDURE: Woods lamp Exam   INDICATIONS: Left eye, pain, redness.    PROCEDURE PROVIDER: Pepe Myers PA-C   SITE: left eye   CONSENT:  The risks, benefits and alternatives for this procedure were explained to the patient and verbally accepted.     MEDICATION: fluorescein stain and proparacaine   EXAM FINDINGS: Left Eye: Injection to the left eye, some photophobia, no evidence of any focal uptake, or corneal ulceration or abrasion.   COMPLICATIONS: Patient tolerated procedure well, without complication       Hector Myers PA-C  Emergency Medicine  [facility] EMERGENCY ROOM  [facility] [facility] [address] [phone]  Dept: [phone]     Hector Myers PA-C  04/23/23 1323

## 2023-04-28 ENCOUNTER — LAB (OUTPATIENT)
Dept: LAB | Facility: CLINIC | Age: 33
End: 2023-04-28
Payer: COMMERCIAL

## 2023-04-28 DIAGNOSIS — H20.019 PRIMARY IRIDOCYCLITIS: Primary | ICD-10-CM

## 2023-04-28 PROCEDURE — 86431 RHEUMATOID FACTOR QUANT: CPT

## 2023-04-28 PROCEDURE — 86780 TREPONEMA PALLIDUM: CPT

## 2023-04-28 PROCEDURE — 86038 ANTINUCLEAR ANTIBODIES: CPT

## 2023-04-28 PROCEDURE — 81374 HLA I TYPING 1 ANTIGEN LR: CPT

## 2023-04-28 PROCEDURE — 86036 ANCA SCREEN EACH ANTIBODY: CPT

## 2023-04-28 PROCEDURE — 99000 SPECIMEN HANDLING OFFICE-LAB: CPT

## 2023-04-28 PROCEDURE — 36415 COLL VENOUS BLD VENIPUNCTURE: CPT

## 2023-04-28 PROCEDURE — 86037 ANCA TITER EACH ANTIBODY: CPT

## 2023-04-28 PROCEDURE — 85549 MURAMIDASE: CPT | Mod: 90

## 2023-04-28 PROCEDURE — 82164 ANGIOTENSIN I ENZYME TEST: CPT | Mod: 90

## 2023-04-29 LAB
ACE SERPL-CCNC: 28 U/L
T PALLIDUM AB SER QL: NONREACTIVE

## 2023-05-01 LAB — RHEUMATOID FACT SER NEPH-ACNC: <7 IU/ML

## 2023-05-02 LAB
ANA SER QL IF: NEGATIVE
ANCA AB PATTERN SER IF-IMP: NORMAL
C-ANCA TITR SER IF: NORMAL {TITER}
LYSOZYME SERPL-MCNC: 3 UG/ML

## 2023-05-05 LAB
B LOCUS: NORMAL
B27TEST METHOD: NORMAL

## 2023-05-13 ENCOUNTER — MYC MEDICAL ADVICE (OUTPATIENT)
Dept: FAMILY MEDICINE | Facility: CLINIC | Age: 33
End: 2023-05-13
Payer: COMMERCIAL

## 2023-05-15 ENCOUNTER — NURSE TRIAGE (OUTPATIENT)
Dept: FAMILY MEDICINE | Facility: CLINIC | Age: 33
End: 2023-05-15
Payer: COMMERCIAL

## 2023-05-15 DIAGNOSIS — U07.1 INFECTION DUE TO 2019 NOVEL CORONAVIRUS: Primary | ICD-10-CM

## 2023-05-15 NOTE — TELEPHONE ENCOUNTER
Spoke with patient and Paxlovid was precribed.  See nurse triage encounter.  MARII JuradoN RN  [facility]

## 2023-05-15 NOTE — TELEPHONE ENCOUNTER
Writer called patient and left message to return call to clinic.    If patient returns call please route to nurse queue to triage and discuss treatment options.      COVID: day 4   Risk Factors: Obesity   Labs: >6 mo since last LFT - normal   GFR - >90 11/22/21  Medications: Bupropion - instructions regarding medication      ROSA Staley, RN  [facility]

## 2023-05-15 NOTE — TELEPHONE ENCOUNTER
Patient called back to talk to the RN about covid concerns and it said to rout to RN queue to talk more about her concerns.

## 2023-05-15 NOTE — TELEPHONE ENCOUNTER
RN COVID TREATMENT VISIT  05/15/23      The patient has been triaged and does not require a higher level of care.    Radha Dickerson  32 year old  Current weight? 260    Has the patient been seen by a primary care provider at an [facility] or [facility] within the past two years? Yes.   Have you been in close proximity to/do you have a known exposure to a person with a confirmed case of influenza? No.     General treatment eligibility:  Date of positive COVID test (PCR or at home)?  5/10/23    Are you or have you been hospitalized for this COVID-19 infection? No.   Have you received monoclonal antibodies or antiviral treatment for COVID-19 since this positive test? No.   Do you have any of the following conditions that place you at risk of being very sick from COVID-19?   - Chronic lung diseases such as asthma, bronchiectasis, COPD, interstitial lung disease, pulmonary embolism, pulmonary hypertension   - Overweight or Obesity (BMI >85th percentile or BMI 25 or higher)  Yes, patient has at least one high risk condition as noted above.     Current COVID symptoms:   - fever or chills  - shortness of breath or difficulty breathing  - fatigue  - muscle or body aches  - new loss of taste or smell  Yes. Patient has at least one symptom as selected.     How many days since symptoms started? 5 days or less. Established patient, 12 years or older weighing at least 88.2 lbs, who has symptoms that started in the past 5 days, has not been hospitalized nor received treatment already, and is at risk for being very sick from COVID-19.     Treatment eligibility by RN:    Are you currently pregnant or nursing? No    Do you have a clinically significant hypersensitivity to nirmatrelvir or ritonavir, or toxic epidermal necrolysis (TEN) or Fry-Jose Syndrome? No    Do you have a history of hepatitis, any hepatic impairment on the Problem List (such as Child-Rosen Class C, cirrhosis, fatty liver  disease, alcoholic liver disease), or was the last liver lab (hepatic panel, ALT, AST, ALK Phos, bilirubin) elevated in the past 6 months? No    Do you have any history of severe renal impairment (eGFR < 30mL/min)? No    Is patient eligible to continue?   Yes, patient meets all eligibility requirements for the RN COVID treatment (as denoted by all no responses above).     Current Outpatient Medications   Medication Sig Dispense Refill     albuterol (PROAIR HFA/PROVENTIL HFA/VENTOLIN HFA) 108 (90 Base) MCG/ACT inhaler        buPROPion (WELLBUTRIN XL) 150 MG 24 hr tablet        escitalopram (LEXAPRO) 20 MG tablet Take 1 tablet (20 mg) by mouth daily (Patient taking differently: Take 20 mg by mouth daily Now taking 30mg daily) 90 tablet 3     fluocinonide (LIDEX) 0.05 % external solution Apply sparingly to scalp once or twice daily as needed. 60 mL 4     hydrOXYzine (VISTARIL) 25 MG capsule Take 1 capsule (25 mg) by mouth 3 times daily as needed for itching 30 capsule 0     omeprazole (PRILOSEC) 20 MG DR capsule Take 1 capsule (20 mg) by mouth daily 90 capsule 3     triamcinolone (KENALOG) 0.1 % external cream Apply sparingly to affected area twice daily. Do not use for more than 14 days in a row 45 g 3       Medications from List 1 of the standing order (on medications that exclude the use of Paxlovid) that patient is taking: NONE. Is patient taking St. Regis's Wort? No  Is patient taking St. Regis's Wort or any meds from List 1? No.   Medications from List 2 of the standing order (on meds that provider needs to adjust) that patient is taking: NONE. Is patient on any of the meds from List 2? No.   Medications from List 3 of standing order (on meds that a RN needs to adjust) that patient is taking: NONE. Is patient on any meds from List 3? Did discuss to continue taking bupropion but that it may have less effect while taking Paxlovid.  Patient verbalized understanding.     Paxlovid has an approximate 90% reduction in  hospitalization. Paxlovid can possibly cause altered sense of taste, diarrhea (loose, watery stools), high blood pressure, muscle aches.     Would patient like a Paxlovid prescription?   Yes.   Lab Results   Component Value Date    GFRESTIMATED >90 11/22/2021       Was last eGFR reduced? No, eGFR 60 or greater/ No Result on record. Patient can receive the normal renal function dose. Paxlovid Rx sent to    [facility]    Temporary change to home medications: None    All medication adjustments (holds, etc) were discussed with the patient and patient was asked to repeat back (teachback) their med adjustment.  Did patient understand med adjustment? Yes, patient repeated back and understood correctly.        Reviewed the following instructions with the patient:    Paxlovid (nimatrelvir and ritonavir)    How it works  Two medicines (nirmatrelvir and ritonavir) are taken together. They stop the virus from growing. Less amount of virus is easier for your body to fight.    How to take    Medicine comes in a daily container with both medicine tablets. Take by mouth twice daily (once in the morning, once at night) for 5 days.    The number of tablets to take varies by patient.    Don't chew or break capsules. Swallow whole.    When to take  Take as soon as possible after positive COVID-19 test result, and within 5 days of your first symptoms.    Possible side effects  Can cause altered sense of taste, diarrhea (loose, watery stools), high blood pressure, muscle aches.    Pauly Friedman RN                          Reason for Disposition    [1] HIGH RISK for severe COVID complications (e.g., weak immune system, age > 64 years, obesity with BMI of 30 or higher, pregnant, chronic lung disease or other chronic medical condition) AND [2] COVID symptoms (e.g., cough, fever)  (Exceptions: Already seen by PCP and no new or worsening symptoms.)    Additional Information    Negative: SEVERE difficulty breathing (e.g., struggling for  each breath, speaks in single words)    Negative: Difficult to awaken or acting confused (e.g., disoriented, slurred speech)    Negative: Bluish (or gray) lips or face now    Negative: Shock suspected (e.g., cold/pale/clammy skin, too weak to stand, low BP, rapid pulse)    Negative: Sounds like a life-threatening emergency to the triager    Negative: [1] Diagnosed or suspected COVID-19 AND [2] symptoms lasting 3 or more weeks    Negative: [1] COVID-19 exposure AND [2] no symptoms    Negative: COVID-19 vaccine reaction suspected (e.g., fever, headache, muscle aches) occurring 1 to 3 days after getting vaccine    Negative: COVID-19 vaccine, questions about    Negative: [1] Lives with someone known to have influenza (flu test positive) AND [2] flu-like symptoms (e.g., cough, runny nose, sore throat, SOB; with or without fever)    Negative: [1] Adult with possible COVID-19 symptoms AND [2] triager concerned about severity of symptoms or other causes    Negative: COVID-19 and breastfeeding, questions about    Negative: SEVERE or constant chest pain or pressure  (Exception: Mild central chest pain, present only when coughing.)    Negative: MODERATE difficulty breathing (e.g., speaks in phrases, SOB even at rest, pulse 100-120)    Negative: Headache and stiff neck (can't touch chin to chest)    Negative: Oxygen level (e.g., pulse oximetry) 90 percent or lower    Negative: Chest pain or pressure  (Exception: MILD central chest pain, present only when coughing)    Negative: Patient sounds very sick or weak to the triager    Negative: MILD difficulty breathing (e.g., minimal/no SOB at rest, SOB with walking, pulse <100)    Negative: Fever > 103 F (39.4 C)    Negative: [1] Fever > 101 F (38.3 C) AND [2] over 60 years of age    Negative: [1] Fever > 100.0 F (37.8 C) AND [2] bedridden (e.g., nursing home patient, CVA, chronic illness, recovering from surgery)    Answer Assessment - Initial Assessment Questions  1. COVID-19  "DIAGNOSIS: \"Who made your COVID-19 diagnosis?\" \"Was it confirmed by a positive lab test or self-test?\" If not diagnosed by a doctor (or NP/PA), ask \"Are there lots of cases (community spread) where you live?\" Note: See [facility] health department website, if unsure.      Home test on Thursday  2. COVID-19 EXPOSURE: \"Was there any known exposure to COVID before the symptoms began?\" CDC Definition of close contact: within 6 feet (2 meters) for a total of 15 minutes or more over a 24-hour period.       Yes, in public  3. ONSET: \"When did the COVID-19 symptoms start?\"       Wednesday 5/10  4. WORST SYMPTOM: \"What is your worst symptom?\" (e.g., cough, fever, shortness of breath, muscle aches)      Fatigue a dizzinesss  5. COUGH: \"Do you have a cough?\" If Yes, ask: \"How bad is the cough?\"        Cough - thick mucous but throat feels dry and when when coughs brings up mucous  6. FEVER: \"Do you have a fever?\" If Yes, ask: \"What is your temperature, how was it measured, and when did it start?\"      no  7. RESPIRATORY STATUS: \"Describe your breathing?\" (e.g., shortness of breath, wheezing, unable to speak)       If walk up or down stairs gets short of breath and usually does not.  8. BETTER-SAME-WORSE: \"Are you getting better, staying the same or getting worse compared to yesterday?\"  If getting worse, ask, \"In what way?\"      Getting a little better.  9. HIGH RISK DISEASE: \"Do you have any chronic medical problems?\" (e.g., asthma, heart or lung disease, weak immune system, obesity, etc.)      Obesity, mild asthma  10. VACCINE: \"Have you had the COVID-19 vaccine?\" If Yes, ask: \"Which one, how many shots, when did you get it?\"        Vaccine Moderna  11. BOOSTER: \"Have you received your COVID-19 booster?\" If Yes, ask: \"Which one and when did you get it?\"        Booster bivalent  12. PREGNANCY: \"Is there any chance you are pregnant?\" \"When was your last menstrual period?\"        No.  Yesterday  13. OTHER SYMPTOMS: \"Do you have any " "other symptoms?\"  (e.g., chills, fatigue, headache, loss of smell or taste, muscle pain, sore throat)        Chills, dizziness, fatigue, loss of sense of smell and taste, sore throat.   14. O2 SATURATION MONITOR:  \"Do you use an oxygen saturation monitor (pulse oximeter) at home?\" If Yes, ask \"What is your reading (oxygen level) today?\" \"What is your usual oxygen saturation reading?\" (e.g., 95%)        no    Protocols used: CORONAVIRUS (COVID-19) DIAGNOSED OR [id number]      "

## 2023-05-16 NOTE — TELEPHONE ENCOUNTER
Patient calling in and spoke with a nurse about getting Paxlovid.  She picked up the medications just fine.  She took her first dose and her fiance noticed that the medication  in 2023.    Advised to take it back to the pharmacy, not take am dose, and get a new package of medication to start taking.    Caller verbalized understanding and will do so.    Neva Cornejo RN on 5/15/2023 at 9:12 PM

## 2023-05-22 ENCOUNTER — NURSE TRIAGE (OUTPATIENT)
Dept: NURSING | Facility: CLINIC | Age: 33
End: 2023-05-22
Payer: COMMERCIAL

## 2023-05-23 NOTE — TELEPHONE ENCOUNTER
Forgot to take medication this morning and just remembered.  Last took at 11 am 5-21-23.    Patient on Wellbutrin 150 mg XL prescribed by psychiatry with [facility] and Lexapro 20 mg by PCP Dr. Valenzuela.    Patient feeling some dizziness that she gets with withdrawals, but would rather not have to alter her schedule and wants to know if she can skip them today.    Paged Dr. Ng via smart web at 9:02 PM, who said it would be okay for patient to miss today's dose and resume in the morning per her regular schedule.    Provider Recommendation Follow Up:   Reached patient/caregiver. Informed of provider's recommendations. Patient verbalized understanding and agrees with the plan.     Mary Hughes RN  [facility]      Reason for Disposition    [1] Caller has URGENT medicine question about med that PCP or specialist prescribed AND [2] triager unable to answer question    Protocols used: MEDICATION QUESTION CALL-A-

## 2023-06-06 DIAGNOSIS — L21.9 SEBORRHEIC DERMATITIS OF SCALP: ICD-10-CM

## 2023-06-07 RX ORDER — FLUOCINONIDE TOPICAL SOLUTION USP, 0.05% 0.5 MG/ML
SOLUTION TOPICAL
Qty: 60 ML | Refills: 4 | Status: SHIPPED | OUTPATIENT
Start: 2023-06-07

## 2023-06-07 NOTE — TELEPHONE ENCOUNTER
"Routing refill request to provider for review/approval because:  Needs review    Last Written Prescription Date:  5/10/22  Last Fill Quantity: 60,  # refills: 4   Last office visit provider:  3/3/23     Requested Prescriptions   Pending Prescriptions Disp Refills     fluocinonide (LIDEX) 0.05 % external solution [Pharmacy Med Name: Fluocinonide External Solution 0.05 %] 60 mL 0     Sig: Apply sparingly to scalp once or twice daily as needed.       Topical Steroids and Nonsteroidals Protocol Failed - 6/6/2023 10:57 PM        Failed - High potency steroid not ordered        Passed - Patient is age 6 or older        Passed - Authorizing prescriber's most recent note related to this medication read.     If refill request is for ophthalmic use, please forward request to provider for approval.          Passed - Recent (12 mo) or future (30 days) visit within the authorizing provider's specialty     Patient has had an office visit with the authorizing provider or a provider within the authorizing providers department within the previous 12 mos or has a future within next 30 days. See \"Patient Info\" tab in inbasket, or \"Choose Columns\" in Meds & Orders section of the refill encounter.              Passed - Medication is active on med list             Shandra Wilson RN 06/07/23 9:47 AM  "

## 2023-06-08 ENCOUNTER — OFFICE VISIT (OUTPATIENT)
Dept: FAMILY MEDICINE | Facility: CLINIC | Age: 33
End: 2023-06-08
Payer: COMMERCIAL

## 2023-06-08 VITALS
WEIGHT: 273.1 LBS | SYSTOLIC BLOOD PRESSURE: 120 MMHG | TEMPERATURE: 97.1 F | OXYGEN SATURATION: 97 % | BODY MASS INDEX: 46.63 KG/M2 | RESPIRATION RATE: 16 BRPM | DIASTOLIC BLOOD PRESSURE: 80 MMHG | HEIGHT: 64 IN | HEART RATE: 78 BPM

## 2023-06-08 DIAGNOSIS — N92.4 EXCESSIVE BLEEDING IN PREMENOPAUSAL PERIOD: ICD-10-CM

## 2023-06-08 DIAGNOSIS — E66.01 MORBID OBESITY (H): ICD-10-CM

## 2023-06-08 DIAGNOSIS — K59.01 SLOW TRANSIT CONSTIPATION: ICD-10-CM

## 2023-06-08 DIAGNOSIS — R10.9 LEFT FLANK PAIN: ICD-10-CM

## 2023-06-08 DIAGNOSIS — K62.5 RECTAL BLEEDING: ICD-10-CM

## 2023-06-08 DIAGNOSIS — B97.7 HIGH RISK HPV INFECTION: ICD-10-CM

## 2023-06-08 DIAGNOSIS — K62.89 RECTAL PAIN: ICD-10-CM

## 2023-06-08 DIAGNOSIS — R10.12 LUQ ABDOMINAL PAIN: Primary | ICD-10-CM

## 2023-06-08 DIAGNOSIS — F32.2 SEVERE MAJOR DEPRESSION (H): ICD-10-CM

## 2023-06-08 LAB
ALBUMIN SERPL BCG-MCNC: 4.1 G/DL (ref 3.5–5.2)
ALP SERPL-CCNC: 89 U/L (ref 35–104)
ALT SERPL W P-5'-P-CCNC: 18 U/L (ref 10–35)
ANION GAP SERPL CALCULATED.3IONS-SCNC: 11 MMOL/L (ref 7–15)
AST SERPL W P-5'-P-CCNC: 18 U/L (ref 10–35)
BILIRUB SERPL-MCNC: 0.5 MG/DL
BUN SERPL-MCNC: 13.3 MG/DL (ref 6–20)
CALCIUM SERPL-MCNC: 9 MG/DL (ref 8.6–10)
CHLORIDE SERPL-SCNC: 103 MMOL/L (ref 98–107)
CREAT SERPL-MCNC: 0.79 MG/DL (ref 0.51–0.95)
CRP SERPL-MCNC: 8.68 MG/L
DEPRECATED HCO3 PLAS-SCNC: 25 MMOL/L (ref 22–29)
GFR SERPL CREATININE-BSD FRML MDRD: >90 ML/MIN/1.73M2
GLUCOSE SERPL-MCNC: 134 MG/DL (ref 70–99)
POTASSIUM SERPL-SCNC: 4 MMOL/L (ref 3.4–5.3)
PROT SERPL-MCNC: 7.1 G/DL (ref 6.4–8.3)
SODIUM SERPL-SCNC: 139 MMOL/L (ref 136–145)
T4 FREE SERPL-MCNC: 1.17 NG/DL (ref 0.9–1.7)
TSH SERPL DL<=0.005 MIU/L-ACNC: 1.65 UIU/ML (ref 0.3–4.2)

## 2023-06-08 PROCEDURE — 86140 C-REACTIVE PROTEIN: CPT | Performed by: FAMILY MEDICINE

## 2023-06-08 PROCEDURE — 99214 OFFICE O/P EST MOD 30 MIN: CPT | Performed by: FAMILY MEDICINE

## 2023-06-08 PROCEDURE — 36415 COLL VENOUS BLD VENIPUNCTURE: CPT | Performed by: FAMILY MEDICINE

## 2023-06-08 PROCEDURE — 84443 ASSAY THYROID STIM HORMONE: CPT | Performed by: FAMILY MEDICINE

## 2023-06-08 PROCEDURE — 84439 ASSAY OF FREE THYROXINE: CPT | Performed by: FAMILY MEDICINE

## 2023-06-08 PROCEDURE — 80053 COMPREHEN METABOLIC PANEL: CPT | Performed by: FAMILY MEDICINE

## 2023-06-08 RX ORDER — CHOLECALCIFEROL (VITAMIN D3) 50 MCG
1 TABLET ORAL DAILY
COMMUNITY

## 2023-06-08 RX ORDER — BUPROPION HYDROCHLORIDE 300 MG/1
300 TABLET ORAL EVERY MORNING
Start: 2023-06-08 | End: 2023-12-05

## 2023-06-08 ASSESSMENT — PATIENT HEALTH QUESTIONNAIRE - PHQ9
10. IF YOU CHECKED OFF ANY PROBLEMS, HOW DIFFICULT HAVE THESE PROBLEMS MADE IT FOR YOU TO DO YOUR WORK, TAKE CARE OF THINGS AT HOME, OR GET ALONG WITH OTHER PEOPLE: SOMEWHAT DIFFICULT
SUM OF ALL RESPONSES TO PHQ QUESTIONS 1-9: 8
SUM OF ALL RESPONSES TO PHQ QUESTIONS 1-9: 8

## 2023-06-08 ASSESSMENT — PAIN SCALES - GENERAL: PAINLEVEL: NO PAIN (0)

## 2023-06-08 NOTE — PROGRESS NOTES
Assessment & Plan     LUQ abdominal pain  We will obtain CT scan of abdomen pelvis and get colonoscopy.  Labs today will include CMP and C-reactive protein  - Adult GI  Referral - Procedure Only  - CT Abdomen w/o Contrast  - Comprehensive metabolic panel (BMP + Alb, Alk Phos, ALT, AST, Total. Bili, TP)  - CRP, inflammation  - Comprehensive metabolic panel (BMP + Alb, Alk Phos, ALT, AST, Total. Bili, TP)  - CRP, inflammation    Morbid obesity (H)  She continues weight loss efforts    Severe major depression (H)  Updated med list to reflect her current dose of bupropion.  She is followed by a psychiatrist  - buPROPion (WELLBUTRIN XL) 300 MG 24 hr tablet    Rectal pain  We will obtain colonoscopy  - Adult GI  Referral - Procedure Only    Rectal bleeding  Was likely secondary to hemorrhoid.  However she did have elevated lysozyme level.  Discussed that this can be associated with Crohn's disease and ulcerative colitis.  Recommend further evaluation with colonoscopy and order is placed  - Adult GI  Referral - Procedure Only    Left flank pain  We will obtain CT scan to evaluate for kidney stone or other cause of her pain  - CT Abdomen w/o Contrast    Slow transit constipation  Continue adequate fiber and fluid intake.  Encouraged regular exercise.  We will refer patient for colonoscopy.  Check thyroid levels today  - Adult GI  Referral - Procedure Only  - TSH  - T4, free  - TSH  - T4, free    Excessive bleeding in premenopausal period  Had pelvic ultrasound in December which was normal.  Discussed importance of adequate iron intake to prevent iron deficiency.  Can consider using birth control pills to help manage menstrual bleeding.  She previously had an IUD and experienced an ectopic pregnancy so does not want to go that route again.    High risk HPV infection  We discussed HPV test results and recommended follow-up.  Plan to repeat Pap and HPV test in December               BMI:  "  Estimated body mass index is 46.88 kg/m  as calculated from the following:    Height as of this encounter: 1.626 m (5' 4\").    Weight as of this encounter: 123.9 kg (273 lb 1.6 oz).           Cassy Valenzuela MD  [facility] MINAL Garcia is a 32 year old, presenting for the following health issues:  Abdominal Pain (IBS worsening )        6/8/2023     9:04 AM   Additional Questions   Roomed by Jes Joe   Accompanied by Self     HPI   She is seen today for evaluation of left upper quadrant abdominal pain.  Has been experiencing pain in this area for quite some time.  She has variable bowel movements.  Sometimes she will experience diarrhea and sometimes she will experience constipation.  She has a hemorrhoid that has been present since she was 17 years old.  Did have some recent rectal bleeding when she had some diarrhea.  Generally does have to wipe a lot after having a bowel movement.  She has had some rectal pain with bowel movements.  She has abdominal pain before, during and after bowel movements as well as after eating.  Pain is primarily in the left upper quadrant area but goes around to the left flank.  Often she feels like she is not completely emptying her stool when she has a bowel movement.  She does try to get adequate fiber through diet or supplements.  Also drinks plenty water.  Often feels like she has to massage her left upper abdomen/flank area or put pressure on that area to help with bowel movements or relieve discomfort.  Feels that gas gets caught in this area.  Feels that her bowel motility is slowed down.  She has not had any urinary symptoms.  No burning with urination or hematuria.  She has noticed heavier menstrual periods.  Had a pelvic ultrasound last December that was normal.  She has questions about positive HPV test that was found on her Pap smear from December.  She was recently evaluated for uveitis and had blood work looking for underlying " "autoimmune diseases.  These results are reviewed.  Was noticed to have elevated lysozyme level.  Review of systems is otherwise negative.  No other concerns today          Review of Systems         Objective    /80   Pulse 78   Temp 97.1  F (36.2  C) (Temporal)   Resp 16   Ht 1.626 m (5' 4\")   Wt 123.9 kg (273 lb 1.6 oz)   LMP 06/08/2023 (Exact Date)   SpO2 97%   BMI 46.88 kg/m    Body mass index is 46.88 kg/m .  Physical Exam   GENERAL: healthy, alert and no distress  RESP: lungs clear to auscultation - no rales, rhonchi or wheezes  CV: regular rate and rhythm, normal S1 S2, no S3 or S4, no murmur, click or rub, no peripheral edema and peripheral pulses strong  ABDOMEN: soft, nontender, no hepatosplenomegaly, no masses and bowel sounds normal  MS: no gross musculoskeletal defects noted, no edema  PSYCH: mentation appears normal, affect normal/bright                    "

## 2023-06-28 ENCOUNTER — MYC REFILL (OUTPATIENT)
Dept: FAMILY MEDICINE | Facility: CLINIC | Age: 33
End: 2023-06-28
Payer: COMMERCIAL

## 2023-06-28 DIAGNOSIS — R06.2 WHEEZING: Primary | ICD-10-CM

## 2023-06-29 RX ORDER — ALBUTEROL SULFATE 90 UG/1
2 AEROSOL, METERED RESPIRATORY (INHALATION) EVERY 6 HOURS PRN
Qty: 18 G | Refills: 11 | Status: SHIPPED | OUTPATIENT
Start: 2023-06-29

## 2023-06-29 NOTE — TELEPHONE ENCOUNTER
"Routing refill request to provider for review/approval because:  Medication is reported/historical    Last Written Prescription Date:  11/22/21  Last Fill Quantity: ,  # refills:    Last office visit provider:  6/8/23     Requested Prescriptions   Pending Prescriptions Disp Refills     albuterol (PROAIR HFA/PROVENTIL HFA/VENTOLIN HFA) 108 (90 Base) MCG/ACT inhaler 18 g        Asthma Maintenance Inhalers - Anticholinergics Passed - 6/28/2023 10:56 PM        Passed - Patient is age 12 years or older        Passed - Recent (12 mo) or future (30 days) visit within the authorizing provider's specialty     Patient has had an office visit with the authorizing provider or a provider within the authorizing providers department within the previous 12 mos or has a future within next 30 days. See \"Patient Info\" tab in inbasket, or \"Choose Columns\" in Meds & Orders section of the refill encounter.              Passed - Medication is active on med list       Short-Acting Beta Agonist Inhalers Protocol  Passed - 6/28/2023 10:56 PM        Passed - Patient is age 12 or older        Passed - Recent (12 mo) or future (30 days) visit within the authorizing provider's specialty     Patient has had an office visit with the authorizing provider or a provider within the authorizing providers department within the previous 12 mos or has a future within next 30 days. See \"Patient Info\" tab in inbasket, or \"Choose Columns\" in Meds & Orders section of the refill encounter.              Passed - Medication is active on med list             Shandra Wilson RN 06/29/23 1:51 PM  "

## 2023-07-08 ENCOUNTER — NURSE TRIAGE (OUTPATIENT)
Dept: NURSING | Facility: CLINIC | Age: 33
End: 2023-07-08

## 2023-07-08 ENCOUNTER — E-VISIT (OUTPATIENT)
Dept: URGENT CARE | Facility: CLINIC | Age: 33
End: 2023-07-08
Payer: COMMERCIAL

## 2023-07-08 DIAGNOSIS — R19.7 DIARRHEA OF PRESUMED INFECTIOUS ORIGIN: Primary | ICD-10-CM

## 2023-07-08 PROCEDURE — 99421 OL DIG E/M SVC 5-10 MIN: CPT | Performed by: INTERNAL MEDICINE

## 2023-07-08 NOTE — TELEPHONE ENCOUNTER
"Nurse Triage SBAR    Situation: Diarrhea and abdominal pain.     Background: Patient calling. Symptoms started on July 5th about 2 hours after she has lunch. She also had an e-visit.     Assessment: Watery stools. She has been maxing out her pepto bismol daily. Doing the BRAT diet. No appetite. Feeling fatigued. This is her 4th day of diarrhea. She has at least 5 stools a day. She states she has seen small seed sized fresh looking output. She said there looks like there are red fibers inside the seed like output. Still drinking fluids - water and Pedialyte. Some brain fog. She was not able to make it to the bathroom. She started developing abdominal pain in the last few days. Pain has been contently there. Pain is a 4/10. Temp: 96.9. Pain is mainly in the left lower side of her abdomen.     Protocol Recommended Disposition: See Physician within 4 hours (Or PCP Triage)    Recommendation: According to the protocol, Patient should be seen within 4 hours (Or PCP Triage). Advised Patient that the patient needs to wait for a call-back after nurse speaks with the on-call Provider. Care advice given. Patient verbalizes understanding and agrees with plan of care. Reviewed concerning symptoms and when to call back. Connected with scheduling.     Paging on call Dr Daquan Fields at 7:10pm. Per MD - Patient should be seen in the ED for evaluation.     Provider Recommendation Follow Up:   Reached patient/caregiver. Informed of provider's recommendations. Patient verbalized understanding but states she wants to make an appt \"incase\" she \"cannot make it in tonight\". RN did express she should be seen in the ED as advised by the provider but pt insisted on having an appt. Pt connected to scheduling.     Dalia Marti RN Nursing Advisor 7/8/2023 7:15 PM     Reason for Disposition    [1] Constant abdominal pain AND [2] present > 2 hours    Additional Information    Negative: Shock suspected (e.g., cold/pale/clammy skin, too weak to " stand, low BP, rapid pulse)    Negative: Difficult to awaken or acting confused (e.g., disoriented, slurred speech)    Negative: Sounds like a life-threatening emergency to the triager    Negative: Vomiting also present and worse than the diarrhea    Negative: [1] Blood in stool AND [2] without diarrhea    Negative: Diarrhea in a cancer patient who is currently (or recently) receiving chemotherapy or radiation therapy, or cancer patient who has metastatic or end-stage cancer and is receiving palliative care    Negative: [1] SEVERE abdominal pain (e.g., excruciating) AND [2] present > 1 hour    Negative: [1] SEVERE abdominal pain AND [2] age > 60 years    Negative: [1] Blood in the stool AND [2] moderate or large amount of blood    Negative: Black or tarry bowel movements (Exception: chronic-unchanged black-grey bowel movements AND is taking iron pills or Pepto-bismol)    Negative: [1] Drinking very little AND [2] dehydration suspected (e.g., no urine > 12 hours, very dry mouth, very lightheaded)    Negative: Patient sounds very sick or weak to the triager    Negative: [1] SEVERE diarrhea (e.g., 7 or more times / day more than normal) AND [2] age > 60 years    Protocols used: DIARRHEA-A-

## 2023-07-08 NOTE — PATIENT INSTRUCTIONS
Thank you for choosing us for your care. Based on your symptoms and length of illness, I do not think that you need a prescription at this time, as you most likely have a food-borne or viral cause of your diarrhea.  The appearance of your stool is common with diarrhea like this.  Please follow the care advise I ve provided and use the over the counter medications to help relieve your symptoms. View your full visit summary for details by clicking on the link below.     If you re not feeling better within 2-3 days, or if you get worse, we advise an in person visit for further evaluation and possible lab testing.  You can schedule an appointment right here in [facility], or call [phone], or go to an urgent care.  If the visit is for the same symptoms as your eVisit, we ll refund the cost of your eVisit if seen within seven days.      Viral Diarrhea (Adult)    Diarrhea caused by a virus is often called viral gastroenteritis. Many people call it the stomach flu, but it has nothing to do with the flu. The virus that causes diarrhea affects the stomach and intestinal tract. It often lasts from 2 to 7 days. Diarrhea is the passing of loose, watery stools 3 or more times a day.   Symptoms  Along with diarrhea, you may have these symptoms:    Belly (abdominal) pain and cramping    Nausea and vomiting    Loss of bowel control    Fever and chills    Bloody stools  The danger from repeated diarrhea is dehydration. This is when your body loses too much water and other fluids.   Antibiotics don't work well in treating this illness. But there are things you can do at home that will help.   Home care  Follow these home care tips:    If symptoms are severe, rest at home for the next 24 hours or until you are feeling better.    Wash your hands with soap and water or an alcohol-based . This helps prevent the spread of infection. Wash your hands after touching anyone who is sick.    Teach all people in your home when and  how to wash their hands Wet your hands with clean, running water. Lather the backs of your hands, between your fingers, and under your nails. Scrub your hands for at least 20 seconds. If you need a timer, try humming the  Happy Birthday  song from beginning to end twice. Rinse your hands well. Dry them with a clean towel.    Wash your hands after using the toilet and before meals. Clean the toilet after each use.  Food preparation:    People with diarrhea should not make food for others. When making food, wash your hands after touching anyone who is sick.    Wash your hands after using items that have been in contact with raw food. This includes cutting boards, countertops, and knives.    Keep uncooked meats away from cooked and ready-to-eat foods.  Medicines:    You may use acetaminophen or nonsteroidal anti-inflammatory drugs (NSAIDS) such as ibuprofen or naproxen to control fever unless another medicine was prescribed. In addition:  ? Talk with your healthcare provider before using these medicines if you have chronic liver or kidney disease, or ever had a stomach ulcer or GI (gastrointestinal) bleeding.  ? Don t give aspirin (or medicine that contains aspirin) to anyone younger than age 19 unless directed by the provider. Taking aspirin can put them at risk for Reye syndrome. This is a rare but very serious disorder. It most often affects the brain and the liver.  ? Don't use NSAID medicines if you are already taking one for another condition (such as arthritis) or if you are taking aspirin (such as for heart disease or after a stroke).    Anti-diarrhea medicine should be taken for this condition only if advised by your healthcare provider. Sometimes it can make your condition worse. If you have bloody diarrhea or fever, check with your provider before taking this type of medicine.  Diet:    Water and clear liquids are important so you don't get dehydrated. Drink small amounts at a time. Don't guzzle it down. If  you are very dehydrated, sports drinks aren't a good choice. They have too much sugar and not enough electrolytes. In this case, commercially available products called oral rehydration solutions are best.    Caffeine, tobacco, and alcohol can make the diarrhea, cramping, and pain worse. Try to stop using these until you are fully recovered.    Don't force yourself to eat, especially if you have cramping, vomiting, or diarrhea. Don't eat large amounts at a time, even if you are hungry. It may make you feel worse.    If you eat, don't have fatty, greasy, spicy, or fried foods.    Don't have any dairy products, as they can make diarrhea worse.  During the first 24 hours (the first full day) follow the diet below:     Drinks: Water, clear liquids, soft drinks without caffeine; ginger ale, mineral water (plain or flavored), decaffeinated tea and coffee    Soups: Clear broth, consommé, and bouillon    Desserts: Plain gelatin, ice pops, and fruit juice bars  During the next 24 hours (the second day) you may add these to the above if you are feeling better:     Hot cereal, plain toast, bread, rolls, crackers    Plain noodles, rice, mashed potatoes, chicken noodle or rice soup    Unsweetened canned fruit such as applesauce and bananas (not pineapple and citrus)    Limit fat intake to less than 15 grams per day. Don't eat margarine, butter, oils, mayonnaise, sauces, gravies, fried foods, peanut butter, meat, poultry, and fish.    Limit fiber. Don't eat raw or cooked vegetables, fresh fruits (except bananas), and bran cereals.    Limit caffeine and chocolate. No spices or seasonings except salt.  During the next 24 hours:    Slowly go back to a normal diet, as you feel better and your symptoms ease.    If at any time the diarrhea or cramping gets worse, go back to the simpler diet (above) or to clear liquids.  Follow-up care  Follow up with your healthcare provider, or as advised. Call if you aren't getting better in 24 hours  or if the diarrhea lasts more than 1 week. This is even more important if you are in a high-risk group, such as:     Being an older adult    Having a weak immune system (such as from cancer treatment)    Having inflammatory bowel disease (Crohn's disease or colitis)    If a stool (diarrhea) sample was taken, you may call in 2 days (or as directed) for the results.   When to get medical advice  Call your healthcare provider right away if any of these occur:     More belly pain or constant lower right belly pain    Lasting vomiting (can't keep liquids down)    Frequent diarrhea (more than 5 times a day)    Blood in vomit or stool (black or red color)    Eating or drinking less    Dark urine, reduced urine output    Weakness, dizziness    Drowsiness    Fever of 100.4 F (38 C) or higher, or as directed by your provider    New rash    Symptoms get worse or you have new symptoms  Call 911  Call 911 if any of these occur:     Trouble breathing    Feeling confused    Severe drowsiness or trouble waking up    Fainting or loss of consciousness    Fast heart rate    Seizure    Stiff neck  Nathaniel last reviewed this educational content on 2/1/2021 2000-2022 [employer]. All rights reserved. This information is not intended as a substitute for professional medical care. Always follow your healthcare professional's instructions.

## 2023-07-09 ENCOUNTER — OFFICE VISIT (OUTPATIENT)
Dept: FAMILY MEDICINE | Facility: CLINIC | Age: 33
End: 2023-07-09
Payer: COMMERCIAL

## 2023-07-09 VITALS
SYSTOLIC BLOOD PRESSURE: 129 MMHG | OXYGEN SATURATION: 96 % | DIASTOLIC BLOOD PRESSURE: 86 MMHG | RESPIRATION RATE: 16 BRPM | HEART RATE: 72 BPM | TEMPERATURE: 98.5 F

## 2023-07-09 DIAGNOSIS — R19.7 DIARRHEA, UNSPECIFIED TYPE: Primary | ICD-10-CM

## 2023-07-09 PROCEDURE — 99213 OFFICE O/P EST LOW 20 MIN: CPT

## 2023-07-09 NOTE — PATIENT INSTRUCTIONS
We discussed the following at this visit:    Diarrhea.  This may be viral as it has only been 5 days.  Please use the provided diet.  Otherwise have close follow-up with your doctor tomorrow.  If you start beginning to have blood in your stool, severe abdominal pain, or unable to tolerate any liquids please present to the emergency department.    Jamaal Major, DO

## 2023-07-09 NOTE — PROGRESS NOTES
Assessment & Plan     Diarrhea, unspecified type  33-year-old female with past medical history of IBD presenting for 5 days of watery diarrhea without abdominal pain.  No known sick contacts.  Has been being worked up for this over the last several months with colonoscopy coming, reports a history of IBD more with constipation and diarrhea.  Vitally stable on exam, no abdominal pain, appears comfortable.  At this time I do not think it is pertinent that patient present to the emergency department as I do not believe that she is fluid down despite all of her diarrhea.  She will have close follow-up tomorrow with her primary care provider and lives at home with her fiancé will be able to monitor her.  Discussed that this may possibly still be viral etiology for her symptoms given that she also has fatigue.  However may be related to something more inflammatory and therefore recommended that she follow-up with her doc tomorrow.  Patient is in agreement with this plan.    Jamaal Major DO  [facility]    Ivania Garcia is a 33 year old, presenting for the following health issues:  Abdominal Pain (Loose Stools)    HPI     Diarrhea  Onset/Duration: 5-6 days  Description:       Consistency of stool: watery       Blood in stool: No       Number of loose stools past 24 hours: 5 large loose stools  Progression of Symptoms: same  Accompanying signs and symptoms:       Fever: No       Nausea/Vomiting: Nausea, no vomiting.        Abdominal pain: YES- Left sided. Worse with eating.        Episodes of constipation: No, hx of IBS  History   Ill contacts: No  Recent use of antibiotics: No  Recent travels: No  Recent medication-new or changes(Rx or OTC): No  Precipitating or alleviating factors: None  Therapies tried and outcome: PeptoBismol    Review of Systems   Constitutional, HEENT, cardiovascular, pulmonary, gi and gu systems are negative, except as otherwise noted.      Objective    /86    Pulse 72   Temp 98.5  F (36.9  C) (Oral)   Resp 16   LMP 06/08/2023 (Exact Date)   SpO2 96%   There is no height or weight on file to calculate BMI.  Physical Exam   GENERAL: healthy, alert and no distress  NECK: no adenopathy, no asymmetry, masses, or scars and thyroid normal to palpation  RESP: lungs clear to auscultation - no rales, rhonchi or wheezes  CV: regular rate and rhythm, normal S1 S2, no S3 or S4, no murmur, click or rub, no peripheral edema and peripheral pulses strong  ABDOMEN: soft, nontender, no hepatosplenomegaly, no masses and bowel sounds normal  MS: no gross musculoskeletal defects noted, no edema

## 2023-07-10 ENCOUNTER — HOSPITAL ENCOUNTER (OUTPATIENT)
Dept: CT IMAGING | Facility: HOSPITAL | Age: 33
Discharge: HOME | End: 2023-07-10
Attending: FAMILY MEDICINE | Admitting: FAMILY MEDICINE
Payer: COMMERCIAL

## 2023-07-10 ENCOUNTER — OFFICE VISIT (OUTPATIENT)
Dept: INTERNAL MEDICINE | Facility: CLINIC | Age: 33
End: 2023-07-10
Payer: COMMERCIAL

## 2023-07-10 VITALS
BODY MASS INDEX: 46.83 KG/M2 | TEMPERATURE: 98.5 F | HEART RATE: 79 BPM | DIASTOLIC BLOOD PRESSURE: 96 MMHG | HEIGHT: 64 IN | RESPIRATION RATE: 16 BRPM | SYSTOLIC BLOOD PRESSURE: 118 MMHG | OXYGEN SATURATION: 99 % | WEIGHT: 274.3 LBS

## 2023-07-10 DIAGNOSIS — R10.12 LUQ ABDOMINAL PAIN: ICD-10-CM

## 2023-07-10 DIAGNOSIS — R10.9 LEFT FLANK PAIN: ICD-10-CM

## 2023-07-10 DIAGNOSIS — R19.7 DIARRHEA, UNSPECIFIED TYPE: Primary | ICD-10-CM

## 2023-07-10 PROCEDURE — 74176 CT ABD & PELVIS W/O CONTRAST: CPT

## 2023-07-10 PROCEDURE — 99213 OFFICE O/P EST LOW 20 MIN: CPT | Performed by: INTERNAL MEDICINE

## 2023-07-10 ASSESSMENT — PAIN SCALES - GENERAL: PAINLEVEL: MODERATE PAIN (5)

## 2023-07-10 NOTE — PROGRESS NOTES
Assessment/Plan:    Diarrhea uncertain etiology this may be viral gastroenteritis.  Offered diphenoxylate patient declined.  Pepto-Bismol seems to be helping her.  6-day duration thus far.  Discussed and reassured this may take up to 2 weeks to resolve.  No associated signs or symptoms of dehydration no vomiting.  Appetite is depressed.  PCP Dr. Jamison has recommended CT scan of the abdomen but she has been yet to do this.  I have advised her to follow Dr. Valenzuela's advice and get the CT scan abdomen pelvis.  Some associated abdominal pain.  I strongly suggested she should follow-up with her PCP Dr. TODD Jamison and follow the directions as outlined thus far with Dr. Jamison that is to get the CT abdomen and pelvis.  Recently yesterday she was seen in urgency room for the same problem.    History of irritable bowel syndrome primarily constipated and type.  Suggest Metamucil now as well 1 heaping tablespoon daily along with Pepto-Bismol the latter for relief of loose stools.  We had a good discussion.    15 minutes spent on the date of the encounter doing chart review, patient visit and documentation     Subjective:  Radha Dickerson is a 33 year old female presents for the following health issues ongoing diarrhea 6 days.  Even nocturnal diarrhea.  No obvious weight loss her BMI is elevated at 47.  Thought she had food poisoning.  Has been seen in urgent care.  Her PCP Dr. TODD Jamison has suggested CT abdomen and pelvis.  Patient has had a history of IBS constipated and type.  Diarrhea now is described as watery no vomiting.  No fever.  Abdominal pain as well.  Day 6.  There is some rectal urgency.  And rectal incontinence.  She described red particles in the diarrhea stool.  No past health history of inflammatory bowel disease like Crohn's disease or ulcerative colitis or microscopic colitis.  Past health history only IBS.  There is nocturnal diarrhea change in appetite not perceived weight loss her current weight is  "elevated.    ROS:  There is no obvious blood in the stool or melena medication list reviewed reconciled.  No blood in the urine.  She denies chest pain or shortness of breath.    Objective:  BP (!) 118/96 (BP Location: Right arm, Patient Position: Sitting, Cuff Size: Adult Large)   Pulse 79   Temp 98.5  F (36.9  C) (Oral)   Resp 16   Ht 1.626 m (5' 4\")   Wt 124.4 kg (274 lb 4.8 oz)   LMP 07/04/2023 (Exact Date)   SpO2 99%   BMI 47.08 kg/m    She is not in acute distress not toxic her skin was warm and dry no examination otherwise was done.  She does not appear cachectic or icteric.  Not acutely ill.  No tachypnea or central or acrocyanosis noted    Kenneth Renteria MD  Internal Medicine    Answers for HPI/ROS submitted by the patient on 7/10/2023  What is the reason for your visit today? : ongoing diahhrea  How many servings of fruits and vegetables do you eat daily?: 2-3  On average, how many sweetened beverages do you drink each day (Examples: soda, juice, sweet tea, etc.  Do NOT count diet or artificially sweetened beverages)?: 1  How many minutes a day do you exercise enough to make your heart beat faster?: 10 to 19  How many days a week do you exercise enough to make your heart beat faster?: 3 or less  How many days per week do you miss taking your medication?: 0      "

## 2023-07-11 DIAGNOSIS — R19.7 DIARRHEA, UNSPECIFIED TYPE: ICD-10-CM

## 2023-07-11 DIAGNOSIS — K59.01 SLOW TRANSIT CONSTIPATION: ICD-10-CM

## 2023-07-11 DIAGNOSIS — R10.12 LUQ ABDOMINAL PAIN: Primary | ICD-10-CM

## 2023-07-13 ENCOUNTER — HOSPITAL ENCOUNTER (OUTPATIENT)
Facility: CLINIC | Age: 33
End: 2023-07-13
Attending: INTERNAL MEDICINE | Admitting: INTERNAL MEDICINE
Payer: COMMERCIAL

## 2023-07-28 ENCOUNTER — OFFICE VISIT (OUTPATIENT)
Dept: FAMILY MEDICINE | Facility: CLINIC | Age: 33
End: 2023-07-28
Payer: COMMERCIAL

## 2023-07-28 ENCOUNTER — HOSPITAL ENCOUNTER (OUTPATIENT)
Dept: ULTRASOUND IMAGING | Facility: CLINIC | Age: 33
Discharge: HOME | End: 2023-07-28
Attending: EMERGENCY MEDICINE | Admitting: EMERGENCY MEDICINE
Payer: COMMERCIAL

## 2023-07-28 VITALS
HEART RATE: 73 BPM | BODY MASS INDEX: 46.17 KG/M2 | DIASTOLIC BLOOD PRESSURE: 91 MMHG | SYSTOLIC BLOOD PRESSURE: 155 MMHG | OXYGEN SATURATION: 97 % | WEIGHT: 269 LBS | RESPIRATION RATE: 18 BRPM | TEMPERATURE: 98.4 F

## 2023-07-28 DIAGNOSIS — M79.662 PAIN OF LEFT LOWER LEG: ICD-10-CM

## 2023-07-28 DIAGNOSIS — M79.662 PAIN OF LEFT LOWER LEG: Primary | ICD-10-CM

## 2023-07-28 PROCEDURE — 93971 EXTREMITY STUDY: CPT | Mod: LT

## 2023-07-28 PROCEDURE — 99213 OFFICE O/P EST LOW 20 MIN: CPT | Performed by: EMERGENCY MEDICINE

## 2023-07-28 RX ORDER — ESCITALOPRAM OXALATE 10 MG/1
10 TABLET ORAL DAILY
COMMUNITY
End: 2024-08-26

## 2023-07-28 RX ORDER — ESCITALOPRAM OXALATE 20 MG/1
20 TABLET ORAL DAILY
COMMUNITY

## 2023-07-28 NOTE — PROGRESS NOTES
Impression:  Left leg swelling.  Venous Doppler negative for DVT.  May have some venous stasis    Plan:  Compression stockings when up, her blood pressure was a little elevated here today she should follow-up with her primary care doctor and have that repeated, return or seek care if new or worsening symptoms      Chief Complaint:  Patient presents with:  Leg Swelling: Left leg swelling and painful x2 days    Dizziness: Started today           HPI:   Radha Dickerson is a 33 year old female who presents to this clinic for the evaluation of left leg pain.  Patient complains of pain in the left leg from the hip down to the ankle that began yesterday.  It began acutely.  There was no injury.  She has noticed swelling in the leg.  She is noted some tingling in the left foot.  No weakness or numbness.  No back pain.  No chest pain or shortness of breath.  No recent travel or immobilization or surgery.  No family history of blood clots      PMH:   Past Medical History:   Diagnosis Date    Anxiety     Ectopic pregnancy     Mild major depression (H) 11/16/2019    Morbid obesity (H) 7/11/2019     Past Surgical History:   Procedure Laterality Date    TONSILLECTOMY           ROS:  All other systems negative    Meds:    Current Outpatient Medications:     albuterol (PROAIR HFA/PROVENTIL HFA/VENTOLIN HFA) 108 (90 Base) MCG/ACT inhaler, Inhale 2 puffs into the lungs every 6 hours as needed for shortness of breath, wheezing or cough, Disp: 18 g, Rfl: 11    buPROPion (WELLBUTRIN XL) 300 MG 24 hr tablet, Take 1 tablet (300 mg) by mouth every morning, Disp: , Rfl:     escitalopram (LEXAPRO) 10 MG tablet, Take 10 mg by mouth daily, Disp: , Rfl:     escitalopram (LEXAPRO) 20 MG tablet, Take 20 mg by mouth daily, Disp: , Rfl:     fluocinonide (LIDEX) 0.05 % external solution, Apply sparingly to scalp once or twice daily as needed., Disp: 60 mL, Rfl: 4    omeprazole (PRILOSEC) 20 MG DR capsule, Take 1 capsule (20 mg) by mouth daily, Disp:  90 capsule, Rfl: 3    triamcinolone (KENALOG) 0.1 % external cream, Apply sparingly to affected area twice daily. Do not use for more than 14 days in a row, Disp: 45 g, Rfl: 3    vitamin D3 (CHOLECALCIFEROL) 50 mcg (2000 units) tablet, Take 1 tablet by mouth daily, Disp: , Rfl:         Social:  Social History     Socioeconomic History    Marital status: Single     Spouse name: Not on file    Number of children: Not on file    Years of education: Not on file    Highest education level: Not on file   Occupational History    Not on file   Tobacco Use    Smoking status: Never    Smokeless tobacco: Never   Vaping Use    Vaping Use: Never used   Substance and Sexual Activity    Alcohol use: No     Comment: Alcoholic Drinks/day: occ social drink    Drug use: No    Sexual activity: Yes     Partners: Male     Birth control/protection: Condom   Other Topics Concern    Not on file   Social History Narrative    Not on file     Social Determinants of Health     Financial Resource Strain: Not on file   Food Insecurity: Not on file   Transportation Needs: Not on file   Physical Activity: Not on file   Stress: Not on file   Social Connections: Not on file   Intimate Partner Violence: Not on file   Housing Stability: Not on file         Physical Exam:  Vitals:    07/28/23 1521   BP: (!) 155/91   BP Location: Right arm   Patient Position: Sitting   Cuff Size: Adult Large   Pulse: 73   Resp: 18   Temp: 98.4  F (36.9  C)   TempSrc: Oral   SpO2: 97%   Weight: 122 kg (269 lb)      Patient is awake, alert, no distress    Extremities: Left calf is 48 cm in diameter right calf is 47 cm in diameter.  There is some tenderness along the medial upper left calf but no cord.  Skin: No lesions or rash  Neuro: Normal motor and sensory function in all extremities  Psych: Awake, alert, normally responsive      Results:    No results found for this or any previous visit (from the past 24 hour(s)).           Jose Matos MD

## 2023-08-08 ENCOUNTER — NURSE TRIAGE (OUTPATIENT)
Dept: FAMILY MEDICINE | Facility: CLINIC | Age: 33
End: 2023-08-08
Payer: COMMERCIAL

## 2023-08-08 ENCOUNTER — HOSPITAL ENCOUNTER (EMERGENCY)
Facility: HOSPITAL | Age: 33
Discharge: HOME | End: 2023-08-08
Attending: EMERGENCY MEDICINE | Admitting: EMERGENCY MEDICINE
Payer: COMMERCIAL

## 2023-08-08 ENCOUNTER — MYC MEDICAL ADVICE (OUTPATIENT)
Dept: FAMILY MEDICINE | Facility: CLINIC | Age: 33
End: 2023-08-08

## 2023-08-08 VITALS
TEMPERATURE: 97.9 F | HEIGHT: 64 IN | BODY MASS INDEX: 45.24 KG/M2 | WEIGHT: 265 LBS | RESPIRATION RATE: 18 BRPM | OXYGEN SATURATION: 99 % | SYSTOLIC BLOOD PRESSURE: 140 MMHG | DIASTOLIC BLOOD PRESSURE: 87 MMHG | HEART RATE: 70 BPM

## 2023-08-08 DIAGNOSIS — R00.2 PALPITATIONS: ICD-10-CM

## 2023-08-08 DIAGNOSIS — I10 HYPERTENSION, UNSPECIFIED TYPE: ICD-10-CM

## 2023-08-08 LAB
ANION GAP SERPL CALCULATED.3IONS-SCNC: 11 MMOL/L (ref 7–15)
BASOPHILS # BLD AUTO: 0.1 10E3/UL (ref 0–0.2)
BASOPHILS NFR BLD AUTO: 1 %
BUN SERPL-MCNC: 13.1 MG/DL (ref 6–20)
CALCIUM SERPL-MCNC: 10.2 MG/DL (ref 8.6–10)
CHLORIDE SERPL-SCNC: 102 MMOL/L (ref 98–107)
CREAT SERPL-MCNC: 0.83 MG/DL (ref 0.51–0.95)
DEPRECATED HCO3 PLAS-SCNC: 25 MMOL/L (ref 22–29)
EOSINOPHIL # BLD AUTO: 0.2 10E3/UL (ref 0–0.7)
EOSINOPHIL NFR BLD AUTO: 1 %
ERYTHROCYTE [DISTWIDTH] IN BLOOD BY AUTOMATED COUNT: 12.5 % (ref 10–15)
GFR SERPL CREATININE-BSD FRML MDRD: >90 ML/MIN/1.73M2
GLUCOSE SERPL-MCNC: 96 MG/DL (ref 70–99)
HCG UR QL: NEGATIVE
HCT VFR BLD AUTO: 41.1 % (ref 35–47)
HGB BLD-MCNC: 13.9 G/DL (ref 11.7–15.7)
HOLD SPECIMEN: NORMAL
HOLD SPECIMEN: NORMAL
IMM GRANULOCYTES # BLD: 0 10E3/UL
IMM GRANULOCYTES NFR BLD: 0 %
LYMPHOCYTES # BLD AUTO: 3.1 10E3/UL (ref 0.8–5.3)
LYMPHOCYTES NFR BLD AUTO: 30 %
MCH RBC QN AUTO: 29.3 PG (ref 26.5–33)
MCHC RBC AUTO-ENTMCNC: 33.8 G/DL (ref 31.5–36.5)
MCV RBC AUTO: 87 FL (ref 78–100)
MONOCYTES # BLD AUTO: 0.6 10E3/UL (ref 0–1.3)
MONOCYTES NFR BLD AUTO: 6 %
NEUTROPHILS # BLD AUTO: 6.5 10E3/UL (ref 1.6–8.3)
NEUTROPHILS NFR BLD AUTO: 62 %
NRBC # BLD AUTO: 0 10E3/UL
NRBC BLD AUTO-RTO: 0 /100
PLATELET # BLD AUTO: 296 10E3/UL (ref 150–450)
POTASSIUM SERPL-SCNC: 4 MMOL/L (ref 3.4–5.3)
RBC # BLD AUTO: 4.75 10E6/UL (ref 3.8–5.2)
SODIUM SERPL-SCNC: 138 MMOL/L (ref 136–145)
TROPONIN T SERPL HS-MCNC: <6 NG/L
WBC # BLD AUTO: 10.4 10E3/UL (ref 4–11)

## 2023-08-08 PROCEDURE — 84484 ASSAY OF TROPONIN QUANT: CPT | Performed by: STUDENT IN AN ORGANIZED HEALTH CARE EDUCATION/TRAINING PROGRAM

## 2023-08-08 PROCEDURE — 85025 COMPLETE CBC W/AUTO DIFF WBC: CPT | Performed by: STUDENT IN AN ORGANIZED HEALTH CARE EDUCATION/TRAINING PROGRAM

## 2023-08-08 PROCEDURE — 36415 COLL VENOUS BLD VENIPUNCTURE: CPT | Performed by: STUDENT IN AN ORGANIZED HEALTH CARE EDUCATION/TRAINING PROGRAM

## 2023-08-08 PROCEDURE — 80048 BASIC METABOLIC PNL TOTAL CA: CPT | Performed by: EMERGENCY MEDICINE

## 2023-08-08 PROCEDURE — 84484 ASSAY OF TROPONIN QUANT: CPT | Performed by: EMERGENCY MEDICINE

## 2023-08-08 PROCEDURE — 93005 ELECTROCARDIOGRAM TRACING: CPT | Performed by: EMERGENCY MEDICINE

## 2023-08-08 PROCEDURE — 80048 BASIC METABOLIC PNL TOTAL CA: CPT | Performed by: STUDENT IN AN ORGANIZED HEALTH CARE EDUCATION/TRAINING PROGRAM

## 2023-08-08 PROCEDURE — 81025 URINE PREGNANCY TEST: CPT | Performed by: STUDENT IN AN ORGANIZED HEALTH CARE EDUCATION/TRAINING PROGRAM

## 2023-08-08 PROCEDURE — 85025 COMPLETE CBC W/AUTO DIFF WBC: CPT | Performed by: EMERGENCY MEDICINE

## 2023-08-08 PROCEDURE — 93005 ELECTROCARDIOGRAM TRACING: CPT | Performed by: STUDENT IN AN ORGANIZED HEALTH CARE EDUCATION/TRAINING PROGRAM

## 2023-08-08 PROCEDURE — 99284 EMERGENCY DEPT VISIT MOD MDM: CPT

## 2023-08-08 PROCEDURE — 81025 URINE PREGNANCY TEST: CPT | Performed by: EMERGENCY MEDICINE

## 2023-08-08 NOTE — TELEPHONE ENCOUNTER
"Nurse Triage SBAR    Is this a 2nd Level Triage? NO    Situation:   MyChart from patient,   \"My blood pressure has been getting increasingly higher. I had a visit last month with elevated BP, then when I went in for a suspected blood clot in my left leg, my systolic blood pressure was in the 190's.      [facility], and BP was 139/101.      I've historically always had lower BP (~115/75). But I do know HTN runs on my moms side (she was diagnosed in her late 40's).     I haven't had much change, and even through grad school, my BP stayed in the lower range. Caffeine intake has stayed the same (1 per day).\"    Background:   No cardiac hx       Assessment:   1. BLOOD PRESSURE:       Done today at [facility] 139/101 - was at [facility] for [facility] infection   Seen at  last week due to sx of blood clot in left leg on 7/28/23 /91  [place] visit recently also elevated   2. ONSET:       Has been going on for about a month   3. HOW:       Arm cuff   4. HISTORY:      Just started recently   5. MEDICATIONS:       Not on any BP medications, no recent medication changes   6. OTHER SYMPTOMS      Patient states she is having increased dizziness throughout the day and increased fatigue, feels like needs to sleep often which is not normal, increased headaches  7. PREGNANCY:    Unsure if pregnancy, did just have period    Protocol Recommended Disposition:   Go to ED Now    Recommendation:   Recommend patient be seen in ER now due to high BP with sx  Patient agreed to go to [place] ER   Writer called ER to give them information about patient and background regarding situation.     Does the patient meet one of the following criteria for ADS visit consideration? 16+ years old, with an [facility] PCP     TIP  Providers, please consider if this condition is appropriate for management at one of our Acute and Diagnostic Services sites.     If patient is a good candidate, please use dotphrase " <dot>triageresponse and select Refer to ADS to document.      Reason for Disposition   Systolic BP >= 160 OR Diastolic >= 100, and any cardiac or neurologic symptoms (e.g., chest pain, difficulty breathing, unsteady gait, blurred vision)    Additional Information   Negative: Sounds like a life-threatening emergency to the triager    Answer Assessment - Initial Assessment Questions  1. BLOOD PRESSURE:       Done today at [facility] 139/101 - was at [facility] for ear infection   Seen at  last week due to sx of blood clot in left leg on 7/28/23 /91  [place] visit recently also elevated   2. ONSET:       Has been going on for about a month   3. HOW:       Arm cuff   4. HISTORY:      Just started recently   5. MEDICATIONS:       Not on any BP medications, no recent medication changes   6. OTHER SYMPTOMS      Patient states she is having increased dizziness throughout the day and increased fatigue, feels like needs to sleep often which is not normal, increased headaches  7. PREGNANCY:    Unsure if pregnancy, did just have period    Protocols used: Blood Pressure - High-A-OH    ROSA Staley, RN  [facility]

## 2023-08-08 NOTE — ED NOTES
Expected Patient Referral to ED  3:15 PM    Referring Clinic/Provider:  [facility]     Reason for referral/Clinical facts:  32 y/o female presented to the clinic for evaluation of dizziness, HA, fatigue, and palpitations.  Patient is slightly hypertensive and quite anxious appearing in clinic.  Patient is unsure if she is pregnant.  Patient being sent to the ED for further work-up.    Recommendations provided:  Send to ED for further evaluation    Caller was informed that this institution does possess the capabilities and/or resources to provide for patient and should be transferred to our facility.    Discussed that if direct admit is sought and any hurdles are encountered, this ED would be happy to see the patient and evaluate.    Informed caller that recommendations provided are recommendations based only on the facts provided and that they responsible to accept or reject the advice, or to seek a formal in person consultation as needed and that this ED will see/treat patient should they arrive.      Melanie Pisano DO  [facility] EMERGENCY DEPARTMENT  [address] [place] [id number]  [phone]       Melanie Pisano DO  08/08/23 5262

## 2023-08-08 NOTE — TELEPHONE ENCOUNTER
Writer called patient and left message to return call to clinic. Ifpatient returns call please route to nurse queue to discuss blood pressure readings and triage if appropriate.      MARII StaleyN, RN  [facility]

## 2023-08-08 NOTE — ED TRIAGE NOTES
Pt sent in by clinic for evaluation of dizziness, headache, fatigue, and palpitations.  She states she has been having daily palpitations for the last year. Patient is slightly hypertensive and anxious. She stated that she developed slight chest pain after being told to come to ER, but feels as though it is related to her anxiety. Patient is unsure if she is pregnant.      Triage Assessment       Row Name 08/08/23 2709       Triage Assessment (Adult)    Airway WDL WDL       Respiratory WDL    Respiratory WDL WDL       Skin Circulation/Temperature WDL    Skin Circulation/Temperature WDL WDL       Cardiac WDL    Cardiac WDL chest pain       Chest Pain Assessment    Chest Pain Location midsternal       Peripheral/Neurovascular WDL    Peripheral Neurovascular WDL WDL       Cognitive/Neuro/Behavioral WDL    Cognitive/Neuro/Behavioral WDL WDL

## 2023-08-09 ENCOUNTER — PATIENT OUTREACH (OUTPATIENT)
Dept: CARE COORDINATION | Facility: CLINIC | Age: 33
End: 2023-08-09
Payer: COMMERCIAL

## 2023-08-09 LAB
ATRIAL RATE - MUSE: 78 BPM
DIASTOLIC BLOOD PRESSURE - MUSE: NORMAL MMHG
INTERPRETATION ECG - MUSE: NORMAL
P AXIS - MUSE: 35 DEGREES
PR INTERVAL - MUSE: 154 MS
QRS DURATION - MUSE: 86 MS
QT - MUSE: 404 MS
QTC - MUSE: 460 MS
R AXIS - MUSE: -6 DEGREES
SYSTOLIC BLOOD PRESSURE - MUSE: NORMAL MMHG
T AXIS - MUSE: 22 DEGREES
VENTRICULAR RATE- MUSE: 78 BPM

## 2023-08-09 NOTE — LETTER
[facility] CARE COORDINATION  [address]  [place] [address]    August 9, 2023    Radha Dickerson  [address]  [place] [address]      Dear Radha,        I am a  clinic community health worker who works with Cassy Valenzuela MD with the [facility]. I wanted to thank you for spending the time to talk with me.  Below is a description of clinic care coordination and how we can further assist you.       The clinic care coordination team is made up of a registered nurse, , financial resource worker and community health worker who understand the health care system. The goal of clinic care coordination is to help you manage your health and improve access to the health care system. Our team works alongside your provider to assist you in determining your health and social needs. We can help you obtain health care and community resources, providing you with necessary information and education. We can work with you through any barriers and develop a care plan that helps coordinate and strengthen the communication between you and your care team.  Our services are voluntary and are offered without charge to you personally.    If you wish to connect with the Clinic Care Coordination Team, please let your [facility] Primary Care Provider or Clinic Care Team know and they can place a referral. The Clinic Care Coordination team will then reach out by phone to further support you.    We are focused on providing you with the highest-quality healthcare experience possible.    Sincerely,   Your care team with [facility]

## 2023-08-09 NOTE — PROGRESS NOTES
Clinic Care Coordination Contact  Community Health Worker Initial Outreach    CHW Initial Information Gathering:  Referral Source: ED Follow-Up  Preferred Hospital: [facility]  [phone]  Preferred Urgent Care: [facility] [phone]  No PCP office visit in Past Year: No       Patient accepts CC: No, due to the patient stating that at this time there are no concerns for CCC to assist with. Patient will be sent Care Coordination introduction letter for future reference.     STACIE Ferguson  [phone]  [facility] Care Resource [facility]

## 2023-08-09 NOTE — TELEPHONE ENCOUNTER
08/09; Called pt. To relay message below per Dr. Valenzuela. Pt. Stated, Will call back to be seen sooner if needed. As of now, pt. Has an appt. With Dr. Raymond on 08/16/23. [facility].         I recommend OTC tylenol or ibuprofen.  Can also try ice packs.  I do not see that this was evaluated in the ED so I would also recommend she be seen sooner for follow up with any available provider.

## 2023-08-09 NOTE — ED PROVIDER NOTES
EMERGENCY DEPARTMENT ENCOUNTER      NAME: Radha Dickerson  AGE: 33 year old female  YOB: 1990  MRN: [id number]  EVALUATION DATE & TIME: No admission date for patient encounter.    PCP: Cassy Valenzuela    ED PROVIDER: Tony Root M.D.      Chief Complaint   Patient presents with    Palpitations    Headache         FINAL IMPRESSION:  1. Palpitations    2. Hypertension, unspecified type          ED COURSE & MEDICAL DECISION MAKING:    Pertinent Labs & Imaging studies reviewed. (See chart for details)  33 year old female presents to the Emergency Department for evaluation of potation's and fleeting lightheadedness.  Symptoms ongoing for last few days.  Reports she can feel it mostly when she lays down.  Indicates is in the upper chest area.  Patient denies any recent vomiting or diarrhea which would lead to dehydration.  Reports she has been eating fairly well.  Laboratory evaluation and EKG obtained from triage area are unremarkable.  Patient seen in triage area due to ED overcrowding.  She is an obese female but otherwise exam is unremarkable.  Patient likely with simple palpitations.  Long conversation held regarding the nature of palpitation and need for further cardiac evaluation/event monitor if symptoms persist.. Patient appears non toxic with stable vitals signs. Overall exam is benign.        7:06 PM I met with the patient for the initial interview and physical examination. Discussed plan for treatment and workup in the ED.    7:12 PM I discussed plans for discharge with the patient, which they were agreeable to. We discussed supportive cares at home and reasons for return to the ER including new or worsening symptoms. All questions and concerns were addressed. Patient to be discharged by RN.     At the conclusion of the encounter I discussed the results of all of the tests and the disposition. The questions were answered and return precautions provided. The patient or family acknowledged  understanding and was agreeable with the care plan.     Medical Decision Making    History:  Supplemental history from: Documented in chart, if applicable  External Record(s) reviewed: Documented in chart, if applicable.    Work Up:  Chart documentation includes differential considered and any EKGs or imaging independently interpreted by provider, where specified.  In additional to work up documented, I considered the following work up: Documented in chart, if applicable.    External consultation:  Discussion of management with another provider: Documented in chart, if applicable    Complicating factors:  Care impacted by chronic illness: Mental Health and Other: Morbid obesity  Care affected by social determinants of health: N/A    Disposition considerations: Discharge. No recommendations on prescription strength medication(s). See documentation for any additional details.       MEDICATIONS GIVEN IN THE EMERGENCY:  Medications - No data to display    NEW PRESCRIPTIONS STARTED AT TODAY'S ER VISIT  Discharge Medication List as of 8/8/2023  7:32 PM             =================================================================    HPI    Patient information was obtained from: Patient    Use of Intrepreter: N/A    Radha Dickerson is a 33 year old female with a pertient medical history of morbid obesity and anxiety who presents to the ED for evaluation of palpitations.     Per Chart Review, patient was seen at [facility] on 07/10/23 for evaluation of diarrhea. Diarrhea uncertain etiology, may have been viral gastroenteritis. Offered diphenoxylate patient declined.  Pepto-Bismol seemed to be helping her. Six day duration thus far.  Discussed and reassured this may take up to 2 weeks to resolve. No associated signs or symptoms of dehydration no vomiting. Appetite was depressed. PCP Dr. Jamison has recommended CT scan of the abdomen but she has been yet to do this. Some associated abdominal pain. The   day prior she was seen in urgency room for the same problem. History of irritable bowel syndrome primarily constipated and type. Suggested Metamucil now as well 1 heaping tablespoon daily along with Pepto-Bismol the latter for relief of loose stools.     Patient reports  that typically her blood pressure is approximately 115/92, however, at 3 different physician visits this past month she was found to be either 150 systolic, or today to be 144/102. She endorses a PMH of anxiety and depression, but she denies any known history of HTN. She mentions that today she was prompted to visit a physician due to having episodes of palpitations every other day characterized as heart fluttering while laying in bed at night that last for approximately 2 seconds with associated light-headedness and increased work of breathing. She also mentions that she has been having light-headedness in general recently, which has become more noticeable this week. She states she was prompted by the physician she saw today to visit an ED for her symptoms. She endorses recent nausea, but denies any recent vomiting, diarrhea, or appetite change. She denies starting any new medications recently, or any recent changes to her prescription medications. She denies any other complications at this time.       REVIEW OF SYSTEMS   Constitutional:  Denies fever, chills, or appetite change.   Respiratory:  Positive for increased work of breathing. Denies productive cough   Cardiovascular: Positive for palpitations. Denies chest pain.   GI: Positive for nausea/  Denies abdominal pain, vomiting, or change in bowel or bladder habits   Musculoskeletal:  Denies any new muscle/joint swelling  Skin:  Denies rash   Neurologic:  Positive for light-headedness/ Denies focal weakness  All systems negative except as marked.     PAST MEDICAL HISTORY:  Past Medical History:   Diagnosis Date    Anxiety     Ectopic pregnancy     Mild major depression (H) 11/16/2019    Morbid  obesity (H) 7/11/2019       PAST SURGICAL HISTORY:  Past Surgical History:   Procedure Laterality Date    TONSILLECTOMY           CURRENT MEDICATIONS:    No current facility-administered medications for this encounter.    Current Outpatient Medications:     albuterol (PROAIR HFA/PROVENTIL HFA/VENTOLIN HFA) 108 (90 Base) MCG/ACT inhaler, Inhale 2 puffs into the lungs every 6 hours as needed for shortness of breath, wheezing or cough, Disp: 18 g, Rfl: 11    buPROPion (WELLBUTRIN XL) 300 MG 24 hr tablet, Take 1 tablet (300 mg) by mouth every morning, Disp: , Rfl:     escitalopram (LEXAPRO) 10 MG tablet, Take 10 mg by mouth daily, Disp: , Rfl:     escitalopram (LEXAPRO) 20 MG tablet, Take 20 mg by mouth daily, Disp: , Rfl:     fluocinonide (LIDEX) 0.05 % external solution, Apply sparingly to scalp once or twice daily as needed., Disp: 60 mL, Rfl: 4    omeprazole (PRILOSEC) 20 MG DR capsule, Take 1 capsule (20 mg) by mouth daily, Disp: 90 capsule, Rfl: 3    triamcinolone (KENALOG) 0.1 % external cream, Apply sparingly to affected area twice daily. Do not use for more than 14 days in a row, Disp: 45 g, Rfl: 3    vitamin D3 (CHOLECALCIFEROL) 50 mcg (2000 units) tablet, Take 1 tablet by mouth daily, Disp: , Rfl:     ALLERGIES:  Allergies   Allergen Reactions    Grapefruit [Grapefruit Extract] Unknown    Morphine Other (See Comments)     Muscle cramps       FAMILY HISTORY:  Family History   Problem Relation Age of Onset    Cancer Mother         skin    Alcoholism Mother     Hyperlipidemia Father     Breast Cancer Paternal Aunt     Breast Cancer Paternal Grandmother     Colon Cancer Maternal Grandmother     Cerebrovascular Disease Maternal Grandmother     Breast Cancer Paternal Aunt     Breast Cancer Paternal Aunt     Breast Cancer Paternal Cousin     Breast Cancer Paternal Cousin     Diabetes Other        SOCIAL HISTORY:   Social History     Socioeconomic History    Marital status: Single     Spouse name: None    Number of  "children: None    Years of education: None    Highest education level: None   Tobacco Use    Smoking status: Never    Smokeless tobacco: Never   Vaping Use    Vaping Use: Never used   Substance and Sexual Activity    Alcohol use: No     Comment: Alcoholic Drinks/day: occ social drink    Drug use: No    Sexual activity: Yes     Partners: Male     Birth control/protection: Condom       VITALS:  Patient Vitals for the past 24 hrs:   BP Temp Temp src Pulse Resp SpO2 Height Weight   08/08/23 1940 (!) 140/87 -- -- 70 18 99 % -- --   08/08/23 1726 133/79 -- -- 74 -- 96 % -- --   08/08/23 1532 (!) 147/98 97.9  F (36.6  C) Oral 88 24 98 % 1.626 m (5' 4\") 120.2 kg (265 lb)        PHYSICAL EXAM    Constitutional:  Awake, alert, in no apparent distress  HENT:  Normocephalic, Atraumatic. Bilateral external ears normal. Oropharynx moist. Nose normal. Neck- Normal range of motion with no guarding, No midline cervical tenderness, Supple, No stridor.   Eyes:  PERRL, EOMI with no signs of entrapment, Conjunctiva normal, No discharge.   Respiratory:  Normal breath sounds, No respiratory distress, No wheezing.    Cardiovascular:  Normal heart rate, Normal rhythm, No appreciable rubs or gallops.   GI:  Soft, No tenderness, No distension, No palpable masses  Musculoskeletal:  Intact distal pulses, No edema. Good range of motion in all major joints. No tenderness to palpation or major deformities noted.  Integument:  Warm, Dry, No erythema, No rash.   Neurologic:  Alert & oriented, Normal motor function, Normal sensory function, No focal deficits noted.   Psychiatric:  Affect normal, Judgment normal, Mood normal.     LAB:  All pertinent labs reviewed and interpreted.  Results for orders placed or performed during the hospital encounter of 08/08/23   HCG qualitative urine   Result Value Ref Range    hCG Urine Qualitative Negative Negative   Basic metabolic panel   Result Value Ref Range    Sodium 138 136 - 145 mmol/L    Potassium 4.0 3.4 - " 5.3 mmol/L    Chloride 102 98 - 107 mmol/L    Carbon Dioxide (CO2) 25 22 - 29 mmol/L    Anion Gap 11 7 - 15 mmol/L    Urea Nitrogen 13.1 6.0 - 20.0 mg/dL    Creatinine 0.83 0.51 - 0.95 mg/dL    Calcium 10.2 (H) 8.6 - 10.0 mg/dL    Glucose 96 70 - 99 mg/dL    GFR Estimate >90 >60 mL/min/1.73m2   Result Value Ref Range    Troponin T, High Sensitivity <6 <=14 ng/L   CBC with platelets and differential   Result Value Ref Range    WBC Count 10.4 4.0 - 11.0 10e3/uL    RBC Count 4.75 3.80 - 5.20 10e6/uL    Hemoglobin 13.9 11.7 - 15.7 g/dL    Hematocrit 41.1 35.0 - 47.0 %    MCV 87 78 - 100 fL    MCH 29.3 26.5 - 33.0 pg    MCHC 33.8 31.5 - 36.5 g/dL    RDW 12.5 10.0 - 15.0 %    Platelet Count 296 150 - 450 10e3/uL    % Neutrophils 62 %    % Lymphocytes 30 %    % Monocytes 6 %    % Eosinophils 1 %    % Basophils 1 %    % Immature Granulocytes 0 %    NRBCs per 100 WBC 0 <1 /100    Absolute Neutrophils 6.5 1.6 - 8.3 10e3/uL    Absolute Lymphocytes 3.1 0.8 - 5.3 10e3/uL    Absolute Monocytes 0.6 0.0 - 1.3 10e3/uL    Absolute Eosinophils 0.2 0.0 - 0.7 10e3/uL    Absolute Basophils 0.1 0.0 - 0.2 10e3/uL    Absolute Immature Granulocytes 0.0 <=0.4 10e3/uL    Absolute NRBCs 0.0 10e3/uL   Extra Blue Top Tube   Result Value Ref Range    Hold Specimen JIC    Extra Red Top Tube   Result Value Ref Range    Hold Specimen JIC        RADIOLOGY:  Reviewed all pertinent imaging. Please see official radiology report.  No orders to display       EKG:    Normal sinus rhythm.  Rate of 78.  Normal QRS.  Normal ST segments.  Normal EKG.  No prior tracing.  I have independently reviewed and interpreted the EKG(s) documented above.    PROCEDURES:   None.       I, Akbar Perry, am serving as a scribe to document services personally performed by Tony Root MD, based on my observation and the provider's statements to me. I, Tony Root MD attest that Akbar Perry is acting in a scribe capacity, has observed my performance of the services and  has documented them in accordance with my direction.    Tony Root M.D.  Emergency Medicine  [facility] EMERGENCY DEPARTMENT       Tony Root MD  08/08/23 1463

## 2023-08-15 ASSESSMENT — PATIENT HEALTH QUESTIONNAIRE - PHQ9
SUM OF ALL RESPONSES TO PHQ QUESTIONS 1-9: 6
10. IF YOU CHECKED OFF ANY PROBLEMS, HOW DIFFICULT HAVE THESE PROBLEMS MADE IT FOR YOU TO DO YOUR WORK, TAKE CARE OF THINGS AT HOME, OR GET ALONG WITH OTHER PEOPLE: SOMEWHAT DIFFICULT
SUM OF ALL RESPONSES TO PHQ QUESTIONS 1-9: 6

## 2023-08-16 ENCOUNTER — OFFICE VISIT (OUTPATIENT)
Dept: FAMILY MEDICINE | Facility: CLINIC | Age: 33
End: 2023-08-16
Payer: COMMERCIAL

## 2023-08-16 VITALS
WEIGHT: 270 LBS | SYSTOLIC BLOOD PRESSURE: 115 MMHG | HEART RATE: 75 BPM | RESPIRATION RATE: 11 BRPM | DIASTOLIC BLOOD PRESSURE: 83 MMHG | HEIGHT: 64 IN | TEMPERATURE: 98.3 F | BODY MASS INDEX: 46.1 KG/M2 | OXYGEN SATURATION: 98 %

## 2023-08-16 DIAGNOSIS — R03.0 BORDERLINE BLOOD PRESSURE: Primary | ICD-10-CM

## 2023-08-16 PROCEDURE — 99212 OFFICE O/P EST SF 10 MIN: CPT | Performed by: FAMILY MEDICINE

## 2023-08-16 ASSESSMENT — PAIN SCALES - GENERAL: PAINLEVEL: NO PAIN (0)

## 2023-08-16 NOTE — PROGRESS NOTES
"  Assessment & Plan     Borderline blood pressure  Patient had some fluctuating blood pressures at different offices in the last month; today's blood pressure reading here is normal.  She did take about 8 blood pressure readings at home which are little variable.  Would like her to purchase a Aguayo [place] blood pressure cuff check her blood pressures twice daily keep a diary come back in a month bring her Aguayo [place] and we will check that against our blood pressure cuff here.  No treatment at this time I have reviewed all of her visits during the past month.  She is experiencing some intermittent headaches but they may be stress related.           MED REC REQUIRED  Post Medication Reconciliation Status:   BMI:   Estimated body mass index is 46.15 kg/m  as calculated from the following:    Height as of this encounter: 1.629 m (5' 4.13\").    Weight as of this encounter: 122.5 kg (270 lb).   Weight management plan: Discussed healthy diet and exercise guidelines        Michael Will MD  [facility] MINAL Garcia is a 33 year old, presenting for the following health issues:  Hospital F/U      RENZO Soto experienced some high blood pressure readings at home and communicated those to our healthcare system and was told to visit the emergency room where she was seen and evaluated and he had variable blood pressure readings.  She was directed back to primary care.  She did purchase a home blood pressure cuff and has some readings which vary from perfectly normal to systolics of 160/90; she has had some intermittent headaches but no visual disturbances or TIA-like concerns.  She is engaged and has been for 2 years she and her fiancé are planning a fall wedding.  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  Very active productive young woman.  I would like her to keep a diary for 1 month blood pressure readings twice daily legs apart no urgency with regards to bladder relaxed with " a Aguayo [place] good-quality blood pressure cuff to bring on his back in a month and to sit down with us see what her numbers are and compare her Aguayo Rut cuff with ours.  This will give us a much better pattern and she will keep a diary we will see what her symptoms are.  She agrees with this repeated this back to me of course if she does develop alarming blood pressure cuff with a good quality HBP we want her to call us immediately and/or return to urgency care or ER care.      Hospital Follow-up Visit:    Hospital/Nursing Home/IP Rehab Facility: [facility]  Date of Admission: 08/08/2023  Date of Discharge: 08/08/2023  Reason(s) for Admission: Blood Pressure     Was your hospitalization related to COVID-19? No   Problems taking medications regularly:  None  Medication changes since discharge: None  Problems adhering to non-medication therapy:  None    Summary of hospitalization:  [facility] discharge summary reviewed  Diagnostic Tests/Treatments reviewed.  Follow up needed:   Other Healthcare Providers Involved in Patient s Care:           Update since discharge: improved.         Plan of care communicated with patient                   Review of Systems   Constitutional, HEENT, cardiovascular, pulmonary, gi and gu systems are negative, except as otherwise noted.      Objective    LMP 07/04/2023 (Exact Date)   There is no height or weight on file to calculate BMI.  Physical Exam   GENERAL: healthy, alert and no distress  NECK: no adenopathy, no asymmetry, masses, or scars and thyroid normal to palpation  RESP: lungs clear to auscultation - no rales, rhonchi or wheezes  CV: regular rate and rhythm, normal S1 S2, no S3 or S4, no murmur, click or rub, no peripheral edema and peripheral pulses strong  ABDOMEN: soft, nontender, no hepatosplenomegaly, no masses and bowel sounds normal  MS: no gross musculoskeletal defects noted, no edema    Blood pressure reading today  121/over 71                Answers submitted by the patient for this visit:  Patient Health Questionnaire (Submitted on 8/15/2023)  If you checked off any problems, how difficult have these problems made it for you to do your work, take care of things at home, or get along with other people?: Somewhat difficult  PHQ9 TOTAL SCORE: 6

## 2023-09-08 ENCOUNTER — TRANSFERRED RECORDS (OUTPATIENT)
Dept: HEALTH INFORMATION MANAGEMENT | Facility: CLINIC | Age: 33
End: 2023-09-08
Payer: COMMERCIAL

## 2023-09-19 ENCOUNTER — TRANSFERRED RECORDS (OUTPATIENT)
Dept: HEALTH INFORMATION MANAGEMENT | Facility: CLINIC | Age: 33
End: 2023-09-19
Payer: COMMERCIAL

## 2023-09-28 NOTE — OR NURSING
Pt called back and stated she already talked to [facility] a few days ago and canceled d/t not having a ride and person after her procedure. Kevan was updated.

## 2023-10-07 ENCOUNTER — HEALTH MAINTENANCE LETTER (OUTPATIENT)
Age: 33
End: 2023-10-07

## 2023-10-25 DIAGNOSIS — K21.9 GASTROESOPHAGEAL REFLUX DISEASE WITHOUT ESOPHAGITIS: ICD-10-CM

## 2023-12-05 PROBLEM — F32.0 MILD MAJOR DEPRESSION (H): Status: ACTIVE | Noted: 2019-11-16

## 2023-12-05 PROBLEM — R10.9 NONSPECIFIC ABDOMINAL PAIN: Status: ACTIVE | Noted: 2023-09-08

## 2023-12-05 PROBLEM — R19.8 ALTERED BOWEL FUNCTION: Status: ACTIVE | Noted: 2023-12-05

## 2023-12-05 PROBLEM — R13.10 DYSPHAGIA: Status: ACTIVE | Noted: 2023-12-05

## 2023-12-05 PROBLEM — H20.9 UVEITIS OF LEFT EYE: Status: ACTIVE | Noted: 2023-04-22

## 2023-12-05 PROBLEM — R12 HEARTBURN: Status: ACTIVE | Noted: 2023-12-05

## 2023-12-05 PROBLEM — K62.5 HEMORRHAGE OF RECTUM AND ANUS: Status: ACTIVE | Noted: 2023-09-08

## 2023-12-05 PROBLEM — F41.9 ANXIETY: Status: ACTIVE | Noted: 2021-07-06

## 2023-12-07 ENCOUNTER — OFFICE VISIT (OUTPATIENT)
Dept: FAMILY MEDICINE | Facility: CLINIC | Age: 33
End: 2023-12-07
Payer: COMMERCIAL

## 2023-12-07 VITALS
OXYGEN SATURATION: 97 % | WEIGHT: 273.7 LBS | SYSTOLIC BLOOD PRESSURE: 118 MMHG | DIASTOLIC BLOOD PRESSURE: 83 MMHG | HEIGHT: 64 IN | TEMPERATURE: 97.9 F | BODY MASS INDEX: 46.73 KG/M2 | HEART RATE: 74 BPM

## 2023-12-07 DIAGNOSIS — Z13.88 SCREENING FOR LEAD EXPOSURE: Primary | ICD-10-CM

## 2023-12-07 PROCEDURE — 83655 ASSAY OF LEAD: CPT | Mod: 90 | Performed by: FAMILY MEDICINE

## 2023-12-07 PROCEDURE — 99213 OFFICE O/P EST LOW 20 MIN: CPT | Performed by: FAMILY MEDICINE

## 2023-12-07 PROCEDURE — 36415 COLL VENOUS BLD VENIPUNCTURE: CPT | Performed by: FAMILY MEDICINE

## 2023-12-07 PROCEDURE — 99000 SPECIMEN HANDLING OFFICE-LAB: CPT | Performed by: FAMILY MEDICINE

## 2023-12-07 RX ORDER — DEXTROAMPHETAMINE SACCHARATE, AMPHETAMINE ASPARTATE MONOHYDRATE, DEXTROAMPHETAMINE SULFATE AND AMPHETAMINE SULFATE 3.75; 3.75; 3.75; 3.75 MG/1; MG/1; MG/1; MG/1
15 CAPSULE, EXTENDED RELEASE ORAL DAILY
COMMUNITY
Start: 2023-12-06 | End: 2024-08-26

## 2023-12-07 ASSESSMENT — PATIENT HEALTH QUESTIONNAIRE - PHQ9
SUM OF ALL RESPONSES TO PHQ QUESTIONS 1-9: 7
10. IF YOU CHECKED OFF ANY PROBLEMS, HOW DIFFICULT HAVE THESE PROBLEMS MADE IT FOR YOU TO DO YOUR WORK, TAKE CARE OF THINGS AT HOME, OR GET ALONG WITH OTHER PEOPLE: VERY DIFFICULT
SUM OF ALL RESPONSES TO PHQ QUESTIONS 1-9: 7

## 2023-12-07 ASSESSMENT — PAIN SCALES - GENERAL: PAINLEVEL: NO PAIN (0)

## 2023-12-07 NOTE — COMMUNITY RESOURCES LIST (ENGLISH)
12/07/2023   [facility] - Outpatient Clinics  N/A  For additional resource needs, please contact your health insurance member services or your primary care team.  Phone: [phone]   Email: N/A   Address: [facility] [place] [address]   Hours: N/A        Financial Stability       Utility payment assistance  1  [place] [employer] - [place]'s Telephone Assistance Plan (TAP) and Federal Lifeline and Affordable Connectivity Program (ACP) Distance: 3.21 miles      Phone/Virtual   [address] [place] [address]  Language: English  Fees: Free   Phone: (749) 729-1253 Email: consumer.puc@[facility].mn. Website: https://[place].gov/puc/consumers/telephone/     2  [place] [employer] - Energy and Utilities Distance: 3.35 miles      In-Person, Phone/Virtual   [address] [place] [address]  Language: English  Hours: Mon - Fri 8:30 AM - 4:30 PM  Fees: Free   Phone: (816) 452-9564 Website: https://[place].gov/HiringBosse/energy/consumer-assistance/energy-assistance-program/          Important Numbers & Websites       [facility]   211 211itedway.org  Poison Control   (551) 822-4740 [place]poison.[place]  Suicide and Crisis Lifeline   [address] 8[facility] Hospit[facility].org  Childhelp [place] Child Abuse Hotline   781.931.8251 Childhelphotline.org  National Sexual Assault Hotline   (942) 982-7327 (HOPE) Rainn.org  National Runaway Safeline   (871) 887-3082 (RUNAWAY) 1800runaway.org  Pregnancy & Postpartum Support [place]   Call/text 532-378-6435 Ppsupportmn.org  Substance Abuse National Helpline ([facility]   839-299-HELP (6499) Findtreatment.gov  Emergency Services   911

## 2023-12-07 NOTE — COMMUNITY RESOURCES LIST (ENGLISH)
12/07/2023   [facility]  N/A  For questions about this resource list or additional care needs, please contact your primary care clinic or care manager.  Phone: [phone]   Email: N/A   Address: [address] [place] [address]   Hours: N/A        Financial Stability       Utility payment assistance  1  [employer] [facility] Distance: 1.87 miles      Phone/Virtual   [address] [place] [address]  Language: English, Azeri  Hours: Mon - Thu 8:00 AM - 4:00 PM , Fri 8:00 AM - 12:00 PM  Fees: Free   Phone: (896) 932-1368 Email: ecc@[employer].Giftiki Website: http://SocialKaty.org/     2  [facility] [employer] [facility] - [place] Office - Supportive Housing Assistance Program (SHAP) - Utility payment assistance Distance: 1.96 miles      Phone/Virtual   [address] [place] [address]  Language: English, John, Damon Pablo  Hours: Mon - Fri 8:30 AM - 5:00 PM  Fees: Free   Phone: (945) 551-7966 Email: marci@[employer].org Website: http://www.ong.org/hap-impact-areas/          Important Numbers & Websites       Emergency Services   911  City Services   311  Poison Control   (277) 443-7135  Suicide Prevention Lifeline   (504) 699-6259 (TALK)  Child Abuse Hotline   (141) 107-3611 (4-A-Child)  Sexual Assault Hotline   (910) 911-5135 (HOPE)  National Runaway Safeline   (642) 617-5725 (RUNAWAY)  All-Options Talkline   (831) 552-9883  Substance Abuse Referral   (906) 401-6776 (HELP)

## 2023-12-08 LAB — LEAD BLDV-MCNC: <2 UG/DL

## 2023-12-09 NOTE — PROGRESS NOTES
"  Assessment & Plan     Screening for lead exposure  Workup to include  - Lead Capillary; Future  - Lead Venous Blood Confirm; Future  - Lead Venous Blood Confirm                 Michael Will MD  [facility] MINAL Garcia is a 33 year old, presenting for the following health issues:  Consult (Lead exposure - wanting blood test - from her home )      HPI the patient reports that she has been instructed to get her lead level checked because of a questionable supply to her house as other neighbors have had apparently some blood issues; also the pipes in her house may be questionable.  Patient does not know whether there like bites or copper bites.  She is asymptomatic.  She is not experiencing any headaches or muscle aches or cardiovascular complaints.  I certainly understand her concern I will order a lead level and venous capillary lead level and patient will keep an eye on MyChart.  All other questions were answered.  We will await the test results              Review of Systems   Constitutional, HEENT, cardiovascular, pulmonary, gi and gu systems are negative, except as otherwise noted.      Objective    /83 (BP Location: Left arm, Patient Position: Sitting, Cuff Size: Adult Large)   Pulse 74   Temp 97.9  F (36.6  C) (Temporal)   Ht 1.626 m (5' 4.02\")   Wt 124.1 kg (273 lb 11.2 oz)   LMP 12/05/2023   SpO2 97%   BMI 46.96 kg/m    Body mass index is 46.96 kg/m .  Physical Exam   GENERAL: healthy, alert and no distress  NECK: no adenopathy, no asymmetry, masses, or scars and thyroid normal to palpation  RESP: lungs clear to auscultation - no rales, rhonchi or wheezes  CV: regular rate and rhythm, normal S1 S2, no S3 or S4, no murmur, click or rub, no peripheral edema and peripheral pulses strong  ABDOMEN: soft, nontender, no hepatosplenomegaly, no masses and bowel sounds normal  MS: no gross musculoskeletal defects noted, no edema                      "

## 2023-12-14 ENCOUNTER — PATIENT OUTREACH (OUTPATIENT)
Dept: FAMILY MEDICINE | Facility: CLINIC | Age: 33
End: 2023-12-14
Payer: COMMERCIAL

## 2024-02-12 NOTE — TELEPHONE ENCOUNTER
Hosea Villegas,   Sending to you as a fyi as the medical director since Dr. Valenzuela is no longer with [facility]. Patient is lost to pap tracking follow-up. Attempts to contact pt have been made per reminder process and there has been no reply and/or no appt scheduled.       Pap Hx:  12/29/22 NIL Pap, +HR HPV (not 16 or 18) Plan cotest in 1 year due 12/29/23 01/5/23 Phone attempt #1 no answer, voice mail box was full, unable to leave a voice mail message. Results and recommendations released to the pt through [employer], Seen by patient Radha Dickerson on 1/5/2023  1:57 PM  12/14/23 Reminder Mychart  01/10/24 Reminder call-lm  02/12/24 Lost to follow-up for pap tracking, fyi routed to provider

## 2024-08-14 ENCOUNTER — OFFICE VISIT (OUTPATIENT)
Dept: FAMILY MEDICINE | Facility: CLINIC | Age: 34
End: 2024-08-14
Payer: COMMERCIAL

## 2024-08-14 VITALS
BODY MASS INDEX: 48.65 KG/M2 | TEMPERATURE: 98.5 F | RESPIRATION RATE: 12 BRPM | HEIGHT: 64 IN | WEIGHT: 285 LBS | DIASTOLIC BLOOD PRESSURE: 84 MMHG | SYSTOLIC BLOOD PRESSURE: 112 MMHG | HEART RATE: 91 BPM | OXYGEN SATURATION: 97 %

## 2024-08-14 DIAGNOSIS — Z12.4 SCREENING FOR MALIGNANT NEOPLASM OF CERVIX: ICD-10-CM

## 2024-08-14 DIAGNOSIS — R87.821 VAGINAL LOW RISK HPV DNA TEST POSITIVE: ICD-10-CM

## 2024-08-14 DIAGNOSIS — N89.8 VAGINAL ITCHING: Primary | ICD-10-CM

## 2024-08-14 LAB
CLUE CELLS: ABNORMAL
TRICHOMONAS, WET PREP: ABNORMAL
WBC'S/HIGH POWER FIELD, WET PREP: ABNORMAL
YEAST, WET PREP: ABNORMAL

## 2024-08-14 PROCEDURE — G2211 COMPLEX E/M VISIT ADD ON: HCPCS | Performed by: FAMILY MEDICINE

## 2024-08-14 PROCEDURE — 87210 SMEAR WET MOUNT SALINE/INK: CPT | Performed by: FAMILY MEDICINE

## 2024-08-14 PROCEDURE — 99213 OFFICE O/P EST LOW 20 MIN: CPT | Performed by: FAMILY MEDICINE

## 2024-08-14 PROCEDURE — G0145 SCR C/V CYTO,THINLAYER,RESCR: HCPCS | Performed by: FAMILY MEDICINE

## 2024-08-14 PROCEDURE — 87624 HPV HI-RISK TYP POOLED RSLT: CPT | Performed by: FAMILY MEDICINE

## 2024-08-14 RX ORDER — FLUCONAZOLE 150 MG/1
150 TABLET ORAL ONCE
Qty: 1 TABLET | Refills: 0 | Status: SHIPPED | OUTPATIENT
Start: 2024-08-14 | End: 2024-08-14

## 2024-08-14 RX ORDER — ESCITALOPRAM OXALATE 10 MG/1
10 TABLET ORAL DAILY
Status: CANCELLED | OUTPATIENT
Start: 2024-08-14

## 2024-08-14 ASSESSMENT — ANXIETY QUESTIONNAIRES
2. NOT BEING ABLE TO STOP OR CONTROL WORRYING: NOT AT ALL
GAD7 TOTAL SCORE: 8
7. FEELING AFRAID AS IF SOMETHING AWFUL MIGHT HAPPEN: NOT AT ALL
4. TROUBLE RELAXING: SEVERAL DAYS
5. BEING SO RESTLESS THAT IT IS HARD TO SIT STILL: MORE THAN HALF THE DAYS
6. BECOMING EASILY ANNOYED OR IRRITABLE: MORE THAN HALF THE DAYS
8. IF YOU CHECKED OFF ANY PROBLEMS, HOW DIFFICULT HAVE THESE MADE IT FOR YOU TO DO YOUR WORK, TAKE CARE OF THINGS AT HOME, OR GET ALONG WITH OTHER PEOPLE?: SOMEWHAT DIFFICULT
GAD7 TOTAL SCORE: 8
3. WORRYING TOO MUCH ABOUT DIFFERENT THINGS: SEVERAL DAYS
1. FEELING NERVOUS, ANXIOUS, OR ON EDGE: MORE THAN HALF THE DAYS
GAD7 TOTAL SCORE: 8
7. FEELING AFRAID AS IF SOMETHING AWFUL MIGHT HAPPEN: NOT AT ALL
IF YOU CHECKED OFF ANY PROBLEMS ON THIS QUESTIONNAIRE, HOW DIFFICULT HAVE THESE PROBLEMS MADE IT FOR YOU TO DO YOUR WORK, TAKE CARE OF THINGS AT HOME, OR GET ALONG WITH OTHER PEOPLE: SOMEWHAT DIFFICULT

## 2024-08-14 NOTE — PROGRESS NOTES
"  Assessment & Plan     (N89.8) Vaginal itching  (primary encounter diagnosis)  Comment: pt has vaginal itching for 2 weeks with white discharge. She took abx before the symptoms started. Denies dysuria, blood in urine, abd pain, std exposure. Exam: vaginal white cream discharge. Wet prep not remarkable.  Likely vaginal yeast infection.   Plan: Wet prep - Clinic Collect, fluconazole         (DIFLUCAN) 150 MG tablet        Advise to keep the area dry and air flow. Will treated as vaginal yeast infection.     (R87.821) Vaginal low risk HPV DNA test positive  Comment: reviewed the chart pt had normal pap smear with non risk hpv positive at 12/2022. She was advised to do the cotest in one year but she did not remember the recommendation. She was outreached at 12/14/2023 but not able to reach her.   Plan: Gynecologic Cytology (Pap) and HPV -         Recommended Age 30-65 Years          (Z12.4) Screening for malignant neoplasm of cervix   Comment: pt has positive non risk hpv at 12/2022.   Plan: will do co test now.   Advise pt to follow-up with instruction.     The longitudinal plan of care for the diagnosis(es)/condition(s) as documented were addressed during this visit. Due to the added complexity in care, I will continue to support Radha in the subsequent management and with ongoing continuity of care.      BMI  Estimated body mass index is 48.92 kg/m  as calculated from the following:    Height as of this encounter: 1.626 m (5' 4\").    Weight as of this encounter: 129.3 kg (285 lb).   Weight management plan: Discussed healthy diet and exercise guidelines      See Patient Instructions    Subjective   Radha is a 34 year old, presenting for the following health issues:  Establish Care (Est care. Possible yeast infection. Pt reports she was dx with HPV last year during her PAP smear. Wasn't given any follow up or education on HPV. Since has noticed some change in her vagina health. Noticed lesion. Currently has itching, " "burning, clear discharge. Pt reports discharge smells more acidic. No new partners. Same partner x 10 years. Wears protection. Pt was on abx for an ear infection x 1 month ago and sx started 3-4 weeks ago)      8/14/2024     3:07 PM   Additional Questions   Roomed by Yanna Ralph   Accompanied by none     History of Present Illness       Reason for visit:  Potential yeast infection, recent growths at urethra\vaginal openings , tested posituve for HPV last year  Symptom onset:  3-4 weeks ago  Symptoms include:  Itching at urethra and vaginal openings, burning sensation on vulva  Symptom intensity:  Moderate  Symptom progression:  Worsening  Had these symptoms before:  No  What makes it worse:  Sweating, urinating, wiping,showering  What makes it better:  No    She eats 2-3 servings of fruits and vegetables daily.She consumes 1 sweetened beverage(s) daily.She exercises with enough effort to increase her heart rate 10 to 19 minutes per day.  She exercises with enough effort to increase her heart rate 3 or less days per week. She is missing 1 dose(s) of medications per week.  She is not taking prescribed medications regularly due to remembering to take.              Review of Systems  Constitutional, HEENT, cardiovascular, pulmonary, gi and gu systems are negative, except as otherwise noted.      Objective    /84 (BP Location: Right arm, Patient Position: Sitting, Cuff Size: Adult Regular)   Pulse 91   Temp 98.5  F (36.9  C) (Oral)   Resp 12   Ht 1.626 m (5' 4\")   Wt 129.3 kg (285 lb)   LMP 07/31/2024 (Approximate)   SpO2 97%   Breastfeeding No   BMI 48.92 kg/m    Body mass index is 48.92 kg/m .  Physical Exam   GENERAL: alert and no distress  ABDOMEN: soft, nontender, no hepatosplenomegaly, no masses and bowel sounds normal   (female): normal female external genitalia, normal urethral meatus, normal vaginal mucosa. White cream discharge       Signed Electronically by: Brynn Stockton MD    "

## 2024-08-15 ENCOUNTER — TELEPHONE (OUTPATIENT)
Dept: FAMILY MEDICINE | Facility: CLINIC | Age: 34
End: 2024-08-15
Payer: COMMERCIAL

## 2024-08-15 NOTE — TELEPHONE ENCOUNTER
Spoke to patient and relayed message from provider.  Patient verbalized understanding and agrees with plan.    MARII JuradoN RN  [facility]

## 2024-08-15 NOTE — TELEPHONE ENCOUNTER
Please inform pt that wet prep did not show yeast. But her symptoms are typical yeast infection. I do suggest her to take the diflucan. 3+ wbc means inflammation and is non specific, likely due to yeast infection.     Brynn Stockton MD on 8/15/2024 at 11:25 AM;

## 2024-08-15 NOTE — TELEPHONE ENCOUNTER
Patient calling regarding lab results for Wet Prep.    She has not yet picked up rx for fluconazole and is wondering based off lab results if she needs to take this medication.    Patient would also like to know what the WBC abnormal reading means.     ROSA Staley, RN  [facility]

## 2024-08-16 ENCOUNTER — MYC MEDICAL ADVICE (OUTPATIENT)
Dept: FAMILY MEDICINE | Facility: CLINIC | Age: 34
End: 2024-08-16
Payer: COMMERCIAL

## 2024-08-16 LAB
HPV HR 12 DNA CVX QL NAA+PROBE: POSITIVE
HPV16 DNA CVX QL NAA+PROBE: NEGATIVE
HPV18 DNA CVX QL NAA+PROBE: NEGATIVE
HUMAN PAPILLOMA VIRUS FINAL DIAGNOSIS: ABNORMAL

## 2024-08-16 NOTE — TELEPHONE ENCOUNTER
HPV cotest still in process. Will send to provider for [employer] message response regarding vaginal opening changes.

## 2024-08-20 ENCOUNTER — PATIENT OUTREACH (OUTPATIENT)
Dept: FAMILY MEDICINE | Facility: CLINIC | Age: 34
End: 2024-08-20
Payer: COMMERCIAL

## 2024-08-20 LAB
BKR LAB AP GYN ADEQUACY: NORMAL
BKR LAB AP GYN INTERPRETATION: NORMAL
BKR LAB AP LMP: NORMAL
BKR LAB AP PREVIOUS ABNL DX: NORMAL
BKR LAB AP PREVIOUS ABNORMAL: NORMAL
PATH REPORT.COMMENTS IMP SPEC: NORMAL
PATH REPORT.COMMENTS IMP SPEC: NORMAL
PATH REPORT.RELEVANT HX SPEC: NORMAL

## 2024-08-21 ENCOUNTER — TELEPHONE (OUTPATIENT)
Dept: FAMILY MEDICINE | Facility: CLINIC | Age: 34
End: 2024-08-21

## 2024-08-21 NOTE — TELEPHONE ENCOUNTER
----- Message from Clementina LIMA sent at 8/20/2024  4:20 PM CDT -----  Please call the patient to schedule a colposcopy with Dr Vail. Thanks!    Clementina Godinez RN BSN, Pap Tracking

## 2024-08-26 ENCOUNTER — ORDERS ONLY (AUTO-RELEASED) (OUTPATIENT)
Dept: FAMILY MEDICINE | Facility: CLINIC | Age: 34
End: 2024-08-26

## 2024-08-26 ENCOUNTER — OFFICE VISIT (OUTPATIENT)
Dept: FAMILY MEDICINE | Facility: CLINIC | Age: 34
End: 2024-08-26
Payer: COMMERCIAL

## 2024-08-26 VITALS
OXYGEN SATURATION: 96 % | HEART RATE: 74 BPM | HEIGHT: 65 IN | TEMPERATURE: 97.8 F | BODY MASS INDEX: 47.43 KG/M2 | RESPIRATION RATE: 16 BRPM | SYSTOLIC BLOOD PRESSURE: 140 MMHG | WEIGHT: 284.7 LBS | DIASTOLIC BLOOD PRESSURE: 92 MMHG

## 2024-08-26 DIAGNOSIS — R00.2 PALPITATIONS: Primary | ICD-10-CM

## 2024-08-26 DIAGNOSIS — R00.2 PALPITATIONS: ICD-10-CM

## 2024-08-26 DIAGNOSIS — M79.89 LEG SWELLING: ICD-10-CM

## 2024-08-26 LAB
ERYTHROCYTE [DISTWIDTH] IN BLOOD BY AUTOMATED COUNT: 12.8 % (ref 10–15)
HCT VFR BLD AUTO: 39.4 % (ref 35–47)
HGB BLD-MCNC: 13.2 G/DL (ref 11.7–15.7)
MCH RBC QN AUTO: 29.5 PG (ref 26.5–33)
MCHC RBC AUTO-ENTMCNC: 33.5 G/DL (ref 31.5–36.5)
MCV RBC AUTO: 88 FL (ref 78–100)
PLATELET # BLD AUTO: 299 10E3/UL (ref 150–450)
RBC # BLD AUTO: 4.47 10E6/UL (ref 3.8–5.2)
TSH SERPL DL<=0.005 MIU/L-ACNC: 2.25 UIU/ML (ref 0.3–4.2)
WBC # BLD AUTO: 10.8 10E3/UL (ref 4–11)

## 2024-08-26 PROCEDURE — 84443 ASSAY THYROID STIM HORMONE: CPT

## 2024-08-26 PROCEDURE — 99213 OFFICE O/P EST LOW 20 MIN: CPT

## 2024-08-26 PROCEDURE — 36415 COLL VENOUS BLD VENIPUNCTURE: CPT

## 2024-08-26 PROCEDURE — G2211 COMPLEX E/M VISIT ADD ON: HCPCS

## 2024-08-26 PROCEDURE — 85027 COMPLETE CBC AUTOMATED: CPT

## 2024-08-26 SDOH — HEALTH STABILITY: PHYSICAL HEALTH: ON AVERAGE, HOW MANY MINUTES DO YOU ENGAGE IN EXERCISE AT THIS LEVEL?: 10 MIN

## 2024-08-26 SDOH — HEALTH STABILITY: PHYSICAL HEALTH: ON AVERAGE, HOW MANY DAYS PER WEEK DO YOU ENGAGE IN MODERATE TO STRENUOUS EXERCISE (LIKE A BRISK WALK)?: 5 DAYS

## 2024-08-26 ASSESSMENT — ENCOUNTER SYMPTOMS
SORE THROAT: 1
FATIGUE: 1

## 2024-08-26 ASSESSMENT — SOCIAL DETERMINANTS OF HEALTH (SDOH): HOW OFTEN DO YOU GET TOGETHER WITH FRIENDS OR RELATIVES?: TWICE A WEEK

## 2024-08-26 ASSESSMENT — PAIN SCALES - GENERAL: PAINLEVEL: NO PAIN (0)

## 2024-08-26 NOTE — PROGRESS NOTES
Assessment & Plan     Palpitations  Patient presented to discuss her ongoing heart palpitations. She has worked with psychiatry to change medications. No EKG obtained today due to not currently having symptoms, will review a 7 day zio patch to gain better idea of heart rhythm with each occurrence. Differentials include anemia, hypothyroidism, dehydration d/t minimal water intake, anxiety, heart electrical dysfunction. Encouraged her to increase her water intake to boost hydration. Keep a diary of when symptoms occur and what she is doing in that moment. Follow up in 1 month.   - ZIO PATCH MAIL OUT  - TSH with free T4 reflex  - CBC with platelets  - TSH with free T4 reflex  - CBC with platelets    Leg swelling  Unilateral leg swelling in left leg that is a chronic issue. No suspicious rash, erythema, or skin color changes on exam. Low suspicion for DVT given chronicity and absence of pain on exam. Venous duplex ultrasound reviewed from 7/28/23. Reassuring that swelling improves with elevation and compression socks. Encouraged patient to increase her physical activity, continue compression stocks and elevation.     Counseling  Appropriate preventive services were addressed with this patient via screening, questionnaire, or discussion as appropriate for fall prevention, nutrition, physical activity, Tobacco-use cessation, social engagement, weight loss and cognition.  Checklist reviewing preventive services available has been given to the patient.  Reviewed patient's diet, addressing concerns and/or questions.   The patient was instructed to see the dentist every 6 months.       FUTURE APPOINTMENTS:       - Follow-up visit in 1 month with me to discuss ongoing issues.     Ivania Garcia is a 34 year old, presenting for the following health issues:  Physical, Palpitations, Sleep Problem (Referral), Colonoscopy (Referral), Fatigue (Worsening over past three months), Pharyngitis (More frequent aspiration, ENT  referral), and Headache (R side throbbing headache with eyelid drooping, 4 times over last month)        8/26/2024    12:23 PM   Additional Questions   Roomed by Lulú     Heart Problem  Associated symptoms include fatigue and a sore throat.   Fatigue  Associated symptoms include fatigue and a sore throat.   Pharyngitis        Heart palpitations.   Started noticing it when she went on Escitalopram (2 years ago)  Feels like her heart rate slows down, has to catch her breath, and then it speeds back up. Occurs once every other day.   Diagnosed with ADHD, started stimulant, noticed the heart palpitations more frequently. Stopped adderal a month ago d/t low supply and still has palpitations.   Drinks 1 cold brew/day  Doesn't exercise often.  Eats as best she can.   Will come on with exertion and also can occur at rest.   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   Other symptoms: lightheadedness with palpitations  Reduced escitalopram from 30mg to 20mg.   Substance use: occasional THC beverage, minimal alcohol with socail events. No nicotine.  Will get palpitations with intercourse occassionally.   Drinks 1 20oz water/day.   No family history of thyroid issues, structural heart disease, cardiomyopathy, heart failure.       Leg swelling in left leg, improves with elevation after a few hours. Has pain with swelling and feels her skin is really tight.   Tries to be more active, but has a sedentary job. Wears compression stockings when able to. Compression stockings help  Has been going on for awhile.   Had ultrasound done last year, negative for DVT.   No difficulty with lying down sleeping  Not on contraceptives, last form of birth control was 2015 when she had IUD removed with ectopic pregnancy    Patient was supposed to have a colonoscopy and EGD last year with [facility] but was unable to get it done. She is having some swallowing difficulty, was  "having blood in stools, and has family history of colon cancer. She is going to reach out to her insurance to see if [facility] is still in her network and schedule an appointment with them    Review of Systems  Constitutional, cardiovascular, pulmonary, gi and gu systems are negative, except as otherwise noted.      Objective    BP (!) 142/98 (BP Location: Left arm, Patient Position: Sitting, Cuff Size: Adult Large)   Pulse 74   Temp 97.8  F (36.6  C) (Temporal)   Resp 16   Ht 1.662 m (5' 5.43\")   Wt 129.1 kg (284 lb 11.2 oz)   LMP 07/31/2024 (Approximate)   SpO2 96%   BMI 46.75 kg/m    Body mass index is 46.75 kg/m .  Physical Exam  Constitutional:       Appearance: Normal appearance.   HENT:      Head: Normocephalic.   Cardiovascular:      Rate and Rhythm: Normal rate and regular rhythm.      Pulses: Normal pulses.      Heart sounds: Normal heart sounds. No murmur heard.     No friction rub. No gallop. No S3 or S4 sounds.      Comments: LLE ankle circumference: 26cm  RLE: ankle circumference: 25cm  Pulmonary:      Effort: Pulmonary effort is normal.      Breath sounds: Normal breath sounds.   Musculoskeletal:      Left lower leg: Edema present.   Neurological:      Mental Status: She is alert.            Results for orders placed or performed in visit on 08/26/24 (from the past 24 hour(s))   CBC with platelets   Result Value Ref Range    WBC Count 10.8 4.0 - 11.0 10e3/uL    RBC Count 4.47 3.80 - 5.20 10e6/uL    Hemoglobin 13.2 11.7 - 15.7 g/dL    Hematocrit 39.4 35.0 - 47.0 %    MCV 88 78 - 100 fL    MCH 29.5 26.5 - 33.0 pg    MCHC 33.5 31.5 - 36.5 g/dL    RDW 12.8 10.0 - 15.0 %    Platelet Count 299 150 - 450 10e3/uL           Signed Electronically by: Shara Aquino, APRN CNP    "

## 2024-10-08 ENCOUNTER — TRANSFERRED RECORDS (OUTPATIENT)
Dept: HEALTH INFORMATION MANAGEMENT | Facility: CLINIC | Age: 34
End: 2024-10-08
Payer: COMMERCIAL

## 2024-11-19 ENCOUNTER — DOCUMENTATION ONLY (OUTPATIENT)
Dept: FAMILY MEDICINE | Facility: CLINIC | Age: 34
End: 2024-11-19

## 2024-11-24 ENCOUNTER — HEALTH MAINTENANCE LETTER (OUTPATIENT)
Age: 34
End: 2024-11-24

## 2024-12-15 ENCOUNTER — NURSE TRIAGE (OUTPATIENT)
Dept: NURSING | Facility: CLINIC | Age: 34
End: 2024-12-15
Payer: COMMERCIAL

## 2024-12-15 NOTE — TELEPHONE ENCOUNTER
"Patient and fiance just returned from the grocery store and just noticed some \"dwaine egg blue\" coloring coming out of her nose. It was a crowded [employer]. The bakery section is right by the door and both were sneezing when they first entered the store. It wasn't mucous, just a blue powder that she could wipe away that they were seeing. Patient had more than her fiance. Patient has asthma and is immuno compromised. Denies shortness of breath or difficulty breathing. There is a burning sensation in her left nare.   Patient has a pulse oximeter that has been varying from 93-98%. Currently it is staying at 97% and pulse is 82.   Protocol recommends home care but does not speak directly to this situation.  Encouraged patient to call the [employer] [place] and speak to the bakery staff to ask what they were exposed to. Then call poison control to see what they recommend for the exposure.   Patient verbalized understanding. Patient will call back with worsening symptoms.   Bev Ng RN   12/15/24 1:57 PM  [facility] Nurse Advisor          Reason for Disposition   Nasal FB    Additional Information   Negative: SEVERE difficulty breathing (e.g., struggling for each breath, speaks in single words, stridor)   Negative: Sounds like a life-threatening emergency to the triager   Negative: [1] FB removed AND [2] pain persists > 2 hours   Negative: [1] Suspected FB AND [2] yellow nasal discharge   Negative: [1] FB removed AND [2] yellow-green nasal discharge occurs   Negative: Sharp FB   Negative: Button battery FB   Negative: Bleeding from nose   Negative: Severe nose pain   Negative: [1] Caller unable to remove FB AND [2] not removed after using Care Advice    Protocols used: Nose - Foreign Body-A-AH    "

## 2025-01-12 SDOH — HEALTH STABILITY: PHYSICAL HEALTH: ON AVERAGE, HOW MANY MINUTES DO YOU ENGAGE IN EXERCISE AT THIS LEVEL?: 0 MIN

## 2025-01-12 SDOH — HEALTH STABILITY: PHYSICAL HEALTH: ON AVERAGE, HOW MANY DAYS PER WEEK DO YOU ENGAGE IN MODERATE TO STRENUOUS EXERCISE (LIKE A BRISK WALK)?: 0 DAYS

## 2025-01-12 ASSESSMENT — PATIENT HEALTH QUESTIONNAIRE - PHQ9
SUM OF ALL RESPONSES TO PHQ QUESTIONS 1-9: 7
SUM OF ALL RESPONSES TO PHQ QUESTIONS 1-9: 7
10. IF YOU CHECKED OFF ANY PROBLEMS, HOW DIFFICULT HAVE THESE PROBLEMS MADE IT FOR YOU TO DO YOUR WORK, TAKE CARE OF THINGS AT HOME, OR GET ALONG WITH OTHER PEOPLE: VERY DIFFICULT

## 2025-01-12 ASSESSMENT — SOCIAL DETERMINANTS OF HEALTH (SDOH): HOW OFTEN DO YOU GET TOGETHER WITH FRIENDS OR RELATIVES?: ONCE A WEEK

## 2025-01-13 ENCOUNTER — OFFICE VISIT (OUTPATIENT)
Dept: FAMILY MEDICINE | Facility: CLINIC | Age: 35
End: 2025-01-13
Payer: COMMERCIAL

## 2025-01-13 VITALS
HEIGHT: 64 IN | DIASTOLIC BLOOD PRESSURE: 82 MMHG | WEIGHT: 286 LBS | BODY MASS INDEX: 48.83 KG/M2 | TEMPERATURE: 97.9 F | SYSTOLIC BLOOD PRESSURE: 132 MMHG | RESPIRATION RATE: 20 BRPM | HEART RATE: 94 BPM | OXYGEN SATURATION: 97 %

## 2025-01-13 DIAGNOSIS — Z13.1 SCREENING FOR DIABETES MELLITUS: ICD-10-CM

## 2025-01-13 DIAGNOSIS — L40.9 PSORIASIS: ICD-10-CM

## 2025-01-13 DIAGNOSIS — M25.50 POLYARTHRALGIA: ICD-10-CM

## 2025-01-13 DIAGNOSIS — K21.9 GASTROESOPHAGEAL REFLUX DISEASE WITHOUT ESOPHAGITIS: ICD-10-CM

## 2025-01-13 DIAGNOSIS — Z13.220 SCREENING FOR HYPERLIPIDEMIA: ICD-10-CM

## 2025-01-13 DIAGNOSIS — Z00.00 ROUTINE GENERAL MEDICAL EXAMINATION AT A HEALTH CARE FACILITY: Primary | ICD-10-CM

## 2025-01-13 DIAGNOSIS — Z11.1 SCREENING EXAMINATION FOR PULMONARY TUBERCULOSIS: ICD-10-CM

## 2025-01-13 PROCEDURE — 86481 TB AG RESPONSE T-CELL SUSP: CPT

## 2025-01-13 PROCEDURE — 99213 OFFICE O/P EST LOW 20 MIN: CPT | Mod: 25

## 2025-01-13 PROCEDURE — 90471 IMMUNIZATION ADMIN: CPT

## 2025-01-13 PROCEDURE — 90715 TDAP VACCINE 7 YRS/> IM: CPT

## 2025-01-13 PROCEDURE — 80048 BASIC METABOLIC PNL TOTAL CA: CPT

## 2025-01-13 PROCEDURE — 36415 COLL VENOUS BLD VENIPUNCTURE: CPT

## 2025-01-13 PROCEDURE — 80061 LIPID PANEL: CPT

## 2025-01-13 PROCEDURE — 99395 PREV VISIT EST AGE 18-39: CPT | Mod: 25

## 2025-01-13 ASSESSMENT — PAIN SCALES - GENERAL: PAINLEVEL_OUTOF10: NO PAIN (0)

## 2025-01-13 NOTE — LETTER
January 13, 2025      Radha Dickerson  [address]  [place] [address]        To Whom It May Concern,       Radha Dickerson was seen on 1/13/2025 for an annual physical exam.     Sincerely,        LORENE Cleary CNP    Electronically signed

## 2025-01-13 NOTE — PATIENT INSTRUCTIONS
Patient Education   Preventive Care Advice   This is general advice given by our system to help you stay healthy. However, your care team may have specific advice just for you. Please talk to your care team about your preventive care needs.  Nutrition  Eat 5 or more servings of fruits and vegetables each day.  Try wheat bread, brown rice and whole grain pasta (instead of white bread, rice, and pasta).  Get enough calcium and vitamin D. Check the label on foods and aim for 100% of the RDA (recommended daily allowance).  Lifestyle  Exercise at least 150 minutes each week  (30 minutes a day, 5 days a week).  Do muscle strengthening activities 2 days a week. These help control your weight and prevent disease.  No smoking.  Wear sunscreen to prevent skin cancer.  Have a dental exam and cleaning every 6 months.  Yearly exams  See your health care team every year to talk about:  Any changes in your health.  Any medicines your care team has prescribed.  Preventive care, family planning, and ways to prevent chronic diseases.  Shots (vaccines)   HPV shots (up to age 26), if you've never had them before.  Hepatitis B shots (up to age 59), if you've never had them before.  COVID-19 shot: Get this shot when it's due.  Flu shot: Get a flu shot every year.  Tetanus shot: Get a tetanus shot every 10 years.  Pneumococcal, hepatitis A, and RSV shots: Ask your care team if you need these based on your risk.  Shingles shot (for age 50 and up)  General health tests  Diabetes screening:  Starting at age 35, Get screened for diabetes at least every 3 years.  If you are younger than age 35, ask your care team if you should be screened for diabetes.  Cholesterol test: At age 39, start having a cholesterol test every 5 years, or more often if advised.  Bone density scan (DEXA): At age 50, ask your care team if you should have this scan for osteoporosis (brittle bones).  Hepatitis C: Get tested at least once in your life.  STIs (sexually  transmitted infections)  Before age 24: Ask your care team if you should be screened for STIs.  After age 24: Get screened for STIs if you're at risk. You are at risk for STIs (including HIV) if:  You are sexually active with more than one person.  You don't use condoms every time.  You or a partner was diagnosed with a sexually transmitted infection.  If you are at risk for HIV, ask about PrEP medicine to prevent HIV.  Get tested for HIV at least once in your life, whether you are at risk for HIV or not.  Cancer screening tests  Cervical cancer screening: If you have a cervix, begin getting regular cervical cancer screening tests starting at age 21.  Breast cancer scan (mammogram): If you've ever had breasts, begin having regular mammograms starting at age 40. This is a scan to check for breast cancer.  Colon cancer screening: It is important to start screening for colon cancer at age 45.  Have a colonoscopy test every 10 years (or more often if you're at risk) Or, ask your provider about stool tests like a FIT test every year or Cologuard test every 3 years.  To learn more about your testing options, visit:   .  For help making a decision, visit:   https://bit.ly/ju72513.  Prostate cancer screening test: If you have a prostate, ask your care team if a prostate cancer screening test (PSA) at age 55 is right for you.  Lung cancer screening: If you are a current or former smoker ages 50 to 80, ask your care team if ongoing lung cancer screenings are right for you.  For informational purposes only. Not to replace the advice of your health care provider. Copyright   2023 [facility]. All rights reserved. Clinically reviewed by the [facility]. [id number] - REV 01/24.  Learning About Stress  What is stress?     Stress is your body's response to a hard situation. Your body can have a physical, emotional, or mental response. Stress is a fact of life for most people, and it  affects everyone differently. What causes stress for you may not be stressful for someone else.  A lot of things can cause stress. You may feel stress when you go on a job interview, take a test, or run a race. This kind of short-term stress is normal and even useful. It can help you if you need to work hard or react quickly. For example, stress can help you finish an important job on time.  Long-term stress is caused by ongoing stressful situations or events. Examples of long-term stress include long-term health problems, ongoing problems at work, or conflicts in your family. Long-term stress can harm your health.  How does stress affect your health?  When you are stressed, your body responds as though you are in danger. It makes hormones that speed up your heart, make you breathe faster, and give you a burst of energy. This is called the fight-or-flight stress response. If the stress is over quickly, your body goes back to normal and no harm is done.  But if stress happens too often or lasts too long, it can have bad effects. Long-term stress can make you more likely to get sick, and it can make symptoms of some diseases worse. If you tense up when you are stressed, you may develop neck, shoulder, or low back pain. Stress is linked to high blood pressure and heart disease.  Stress also harms your emotional health. It can make you salgado, tense, or depressed. Your relationships may suffer, and you may not do well at work or school.  What can you do to manage stress?  You can try these things to help manage stress:   Do something active. Exercise or activity can help reduce stress. Walking is a great way to get started. Even everyday activities such as housecleaning or yard work can help.  Try yoga or tracey chi. These techniques combine exercise and meditation. You may need some training at first to learn them.  Do something you enjoy. For example, listen to music or go to a movie. Practice your hobby or do volunteer  "work.  Meditate. This can help you relax, because you are not worrying about what happened before or what may happen in the future.  Do guided imagery. Imagine yourself in any setting that helps you feel calm. You can use online videos, books, or a teacher to guide you.  Do breathing exercises. For example:  From a standing position, bend forward from the waist with your knees slightly bent. Let your arms dangle close to the floor.  Breathe in slowly and deeply as you return to a standing position. Roll up slowly and lift your head last.  Hold your breath for just a few seconds in the standing position.  Breathe out slowly and bend forward from the waist.  Let your feelings out. Talk, laugh, cry, and express anger when you need to. Talking with supportive friends or family, a counselor, or a braxton leader about your feelings is a healthy way to relieve stress. Avoid discussing your feelings with people who make you feel worse.  Write. It may help to write about things that are bothering you. This helps you find out how much stress you feel and what is causing it. When you know this, you can find better ways to cope.  What can you do to prevent stress?  You might try some of these things to help prevent stress:  Manage your time. This helps you find time to do the things you want and need to do.  Get enough sleep. Your body recovers from the stresses of the day while you are sleeping.  Get support. Your family, friends, and community can make a difference in how you experience stress.  Limit your news feed. Avoid or limit time on social media or news that may make you feel stressed.  Do something active. Exercise or activity can help reduce stress. Walking is a great way to get started.  Where can you learn more?  Go to https://www.ecoATM.net/patiented  Enter N032 in the search box to learn more about \"Learning About Stress.\"  Current as of: October 24, 2023  Content Version: 14.3    2024 [employer]. "   Care instructions adapted under license by your healthcare professional. If you have questions about a medical condition or this instruction, always ask your healthcare professional. [employer] disclaims any warranty or liability for your use of this information.    Learning About Depression Screening  What is depression screening?  Depression screening is a way to see if you have depression symptoms. It may be done by a doctor or counselor. It's often part of a routine checkup. That's because your mental health is just as important as your physical health.  Depression is a mental health condition that affects how you feel, think, and act. You may:  Have less energy.  Lose interest in your daily activities.  Feel sad and grouchy for a long time.  Depression is very common. It affects people of all ages.  Many things can lead to depression. Some people become depressed after they have a stroke or find out they have a major illness like cancer or heart disease. The death of a loved one or a breakup may lead to depression. It can run in families. Most experts believe that a combination of inherited genes and stressful life events can cause it.  What happens during screening?  You may be asked to fill out a form about your depression symptoms. You and the doctor will discuss your answers. The doctor may ask you more questions to learn more about how you think, act, and feel.  What happens after screening?  If you have symptoms of depression, your doctor will talk to you about your options.  Doctors usually treat depression with medicines or counseling. Often, combining the two works best. Many people don't get help because they think that they'll get over the depression on their own. But people with depression may not get better unless they get treatment.  The cause of depression is not well understood. There may be many factors involved. But if you have depression, it's not your fault.  A serious symptom  "of depression is thinking about death or suicide. If you or someone you care about talks about this or about feeling hopeless, get help right away.  It's important to know that depression can be treated. Medicine, counseling, and self-care may help.  Where can you learn more?  Go to https://www.[employer].net/patiented  Enter T185 in the search box to learn more about \"Learning About Depression Screening.\"  Current as of: July 31, 2024  Content Version: 14.3    2024 [employer].   Care instructions adapted under license by your healthcare professional. If you have questions about a medical condition or this instruction, always ask your healthcare professional. [employer] disclaims any warranty or liability for your use of this information.    Substance Use Disorder: Care Instructions  Overview     You can improve your life and health by stopping your use of alcohol or drugs. When you don't drink or use drugs, you may feel and sleep better. You may get along better with your family, friends, and coworkers. There are medicines and programs that can help with substance use disorder.  How can you care for yourself at home?  Here are some ways to help you stay sober and prevent relapse.  If you have been given medicine to help keep you sober or reduce your cravings, be sure to take it exactly as prescribed.  Talk to your doctor about programs that can help you stop using drugs or drinking alcohol.  Do not keep alcohol or drugs in your home.  Plan ahead. Think about what you'll say if other people ask you to drink or use drugs. Try not to spend time with people who drink or use drugs.  Use the time and money spent on drinking or drugs to do something that's important to you.  Preventing a relapse  Have a plan to deal with relapse. Learn to recognize changes in your thinking that lead you to drink or use drugs. Get help before you start to drink or use drugs again.  Try to stay away from situations, " friends, or places that may lead you to drink or use drugs.  If you feel the need to drink alcohol or use drugs again, seek help right away. Call a trusted friend or family member. Some people get support from organizations such as Narcotics Anonymous or [employer] or from treatment facilities.  If you relapse, get help as soon as you can. Some people make a plan with another person that outlines what they want that person to do for them if they relapse. The plan usually includes how to handle the relapse and who to notify in case of relapse.  Don't give up. Remember that a relapse doesn't mean that you have failed. Use the experience to learn the triggers that lead you to drink or use drugs. Then quit again. Recovery is a lifelong process. Many people have several relapses before they are able to quit for good.  Follow-up care is a key part of your treatment and safety. Be sure to make and go to all appointments, and call your doctor if you are having problems. It's also a good idea to know your test results and keep a list of the medicines you take.  When should you call for help?   Call [phone]  anytime you think you may need emergency care. For example, call if you or someone else:    Has overdosed or has withdrawal signs. Be sure to tell the emergency workers that you are or someone else is using or trying to quit using drugs. Overdose or withdrawal signs may include:  Losing consciousness.  Seizure.  Seeing or hearing things that aren't there (hallucinations).     Is thinking or talking about suicide or harming others.   Where to get help 24 hours a day, 7 days a week   If you or someone you know talks about suicide, self-harm, a mental health crisis, a substance use crisis, or any other kind of emotional distress, get help right away. You can:    Call the Suicide and Crisis Lifeline at 381.     Call 7-222-425-TALK (1-443.290.7877).     Text HOME to 952695 to access the Crisis Text Line.   Consider saving  "these numbers in your phone.  Go to [employer] for more information or to chat online.  Call your doctor now or seek immediate medical care if:    You are having withdrawal symptoms. These may include nausea or vomiting, sweating, shakiness, and anxiety.   Watch closely for changes in your health, and be sure to contact your doctor if:    You have a relapse.     You need more help or support to stop.   Where can you learn more?  Go to https://www.[employer].net/patiented  Enter H573 in the search box to learn more about \"Substance Use Disorder: Care Instructions.\"  Current as of: November 15, 2023  Content Version: 14.3    2024 [employer].   Care instructions adapted under license by your healthcare professional. If you have questions about a medical condition or this instruction, always ask your healthcare professional. [employer] disclaims any warranty or liability for your use of this information.       "

## 2025-01-13 NOTE — PROGRESS NOTES
Preventive Care Visit  [facility]  LORENE Cleary CNP, Family Medicine  Jan 13, 2025      Assessment & Plan     Routine general medical examination at a health care facility  Reviewed age appropriate screening and immunizations. Screen for cholesterol and diabetes today. Vaccines given: TDAP. Encouraged healthy lifestyle choices to reduce risk of cardiovascular disease. Needs to get colposcopy scheduled. Return in 1 year for annual exam.       Screening for hyperlipidemia  - Lipid panel reflex to direct LDL Non-fasting; Future  - Lipid panel reflex to direct LDL Non-fasting    Screening for diabetes mellitus  - Basic metabolic panel  (Ca, Cl, CO2, Creat, Gluc, K, Na, BUN); Future  - Basic metabolic panel  (Ca, Cl, CO2, Creat, Gluc, K, Na, BUN)    Screening examination for pulmonary tuberculosis  Screening for new job. Asymptomatic.   - Quantiferon TB Gold Plus; Future  - Quantiferon TB Gold Plus    Psoriasis  Treats her scalp symptoms with topical steroid solution. Given history of psoriasis, polyarthralgia, and recurrent uveitis, recommend seeing rheumatology for workup for underlying autoimmune condition.   - Adult Rheumatology  Referral; Future    Polyarthralgia  Chronic, intermittent symptoms. Refer to rheumatology    Gastroesophageal reflux disease without esophagitis  Chronic, refill provided.   - omeprazole (PRILOSEC) 20 MG DR capsule; Take 1 capsule (20 mg) by mouth daily.    Patient has been advised of split billing requirements and indicates understanding: Yes        Counseling  Appropriate preventive services were addressed with this patient via screening, questionnaire, or discussion as appropriate for fall prevention, nutrition, physical activity, Tobacco-use cessation, social engagement, weight loss and cognition.  Checklist reviewing preventive services available has been given to the patient.  Reviewed patient's diet, addressing concerns and/or questions.    The patient was instructed to see the dentist every 6 months.   She is at risk for psychosocial distress and has been provided with information to reduce risk.   The patient's PHQ-9 score is consistent with mild depression. She was provided with information regarding depression.           Ivania Garcia is a 34 year old, presenting for the following:  Labs and Immunization (For work. 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 ) and Physical        1/13/2025     1:23 PM   Additional Questions   Roomed by GM Healy   Accompanied by Self          HPI    Annual physical today.     Has struggled with mental health, Sees psychiatry and psychology.   Psychiatry Manages strattera and escitalopram- hasn't had any heart palpitations since stopping Adderall.   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 Needs physical, TB screen, and updated immunizations.       Needs refill on omeprazole     Has history of psoriasis, mostly scalp  Has had recurrent uveitis- ophthalmology recommends autoimmune workup    Abnormal pap smear this past year, needs to have colposcopy    Health Care Directive  Patient does not have a Health Care Directive: Discussed advance care planning with patient; however, patient declined at this time.      1/12/2025   General Health   How would you rate your overall physical health? (!) FAIR   Feel stress (tense, anxious, or unable to sleep) Rather much   (!) STRESS CONCERN      1/12/2025   Nutrition   Three or more servings of calcium each day? (!) NO   Diet: Regular (no restrictions)   How many servings of fruit and vegetables per day? (!) 0-1   How many sweetened beverages each day? (!) 2         1/12/2025   Exercise   Days per week of moderate/strenous exercise 0 days   Average minutes spent exercising at this  level 0 min   (!) EXERCISE CONCERN      1/12/2025   Social Factors   Frequency of gathering with friends or relatives Once a week   Worry food won't last until get money to buy more No   Food not last or not have enough money for food? No   Do you have housing? (Housing is defined as stable permanent housing and does not include staying ouside in a car, in a tent, in an abandoned building, in an overnight shelter, or couch-surfing.) Yes   Are you worried about losing your housing? No   Lack of transportation? No   Unable to get utilities (heat,electricity)? No         1/12/2025   Dental   Dentist two times every year? (!) NO          Today's PHQ-9 Score:       1/12/2025    11:40 PM   PHQ-9 SCORE   PHQ-9 Total Score MyChart 7 (Mild depression)   PHQ-9 Total Score 7        Patient-reported         1/12/2025   Substance Use   Alcohol more than 3/day or more than 7/wk No   Do you use any other substances recreationally? (!) CANNABIS PRODUCTS     Social History     Tobacco Use    Smoking status: Never     Passive exposure: Never    Smokeless tobacco: Never   Vaping Use    Vaping status: Never Used   Substance Use Topics    Alcohol use: No     Comment: Alcoholic Drinks/day: occ social drink    Drug use: No     Comment: occasional THC beverage          Mammogram Screening - Patient under 40 years of age: Routine Mammogram Screening not recommended.         1/12/2025   STI Screening   New sexual partner(s) since last STI/HIV test? No     History of abnormal Pap smear: No - age 21-29 PAP every 3 years recommended        Latest Ref Rng & Units 8/14/2024     3:33 PM 12/29/2022    11:22 AM 11/13/2019     4:34 PM   PAP / HPV   PAP  Negative for Intraepithelial Lesion or Malignancy (NILM)  Negative for Intraepithelial Lesion or Malignancy (NILM)  Negative for squamous intraepithelial lesion or malignancy  Electronically signed by Rashel Nevarez CT (ASCP) on 11/15/2019 at 10:29 AM      HPV 16 DNA Negative Negative   "Negative     HPV 18 DNA Negative Negative  Negative     Other HR HPV Negative Positive  Positive             1/12/2025   Contraception/Family Planning   Questions about contraception or family planning No        Reviewed and updated as needed this visit by Provider                    Past Medical History:   Diagnosis Date    Anxiety     Depressive disorder     Ectopic pregnancy     Mild major depression 11/16/2019    Morbid obesity (H) 07/11/2019    Uncomplicated asthma          Review of Systems  Constitutional, neuro, ENT, endocrine, pulmonary, cardiac, gastrointestinal, genitourinary, musculoskeletal, integument and psychiatric systems are negative, except as otherwise noted.     Objective    Exam  /82   Pulse 94   Temp 97.9  F (36.6  C) (Oral)   Resp 20   Ht 1.626 m (5' 4\")   Wt 129.7 kg (286 lb)   LMP 12/16/2024 (Approximate)   SpO2 97%   BMI 49.09 kg/m     Estimated body mass index is 49.09 kg/m  as calculated from the following:    Height as of this encounter: 1.626 m (5' 4\").    Weight as of this encounter: 129.7 kg (286 lb).    Physical Exam  GENERAL: alert and no distress  EYES: Eyes grossly normal to inspection, PERRL and conjunctivae and sclerae normal  HENT: ear canals and TM's normal, nose and mouth without ulcers or lesions  NECK: no adenopathy, no asymmetry, masses, or scars  RESP: lungs clear to auscultation - no rales, rhonchi or wheezes  BREAST: normal without masses, tenderness or nipple discharge and no palpable axillary masses or adenopathy  CV: regular rate and rhythm, normal S1 S2, no S3 or S4, no murmur, click or rub, no peripheral edema  ABDOMEN: soft, nontender, no hepatosplenomegaly, no masses and bowel sounds normal  MS: no gross musculoskeletal defects noted, no edema  SKIN: no suspicious lesions or rashes  NEURO: Normal strength and tone, mentation intact and speech normal  PSYCH: mentation appears normal, affect normal/bright        Signed Electronically by: Shara SEWELL" LORENE Aquino CNP    Answers submitted by the patient for this visit:  Patient Health Questionnaire (Submitted on 1/12/2025)  If you checked off any problems, how difficult have these problems made it for you to do your work, take care of things at home, or get along with other people?: Very difficult  PHQ9 TOTAL SCORE: 7

## 2025-01-13 NOTE — LETTER
My Depression Action Plan  Name: Radha Dickerson   Date of Birth 1990  Date: 1/13/2025    My doctor: Shara Aquino   My clinic: [facility]  [address] [facility] [address]  [facility] [address]  [phone]            GREEN    ZONE   Good Control    What it looks like:   Things are going generally well. You have normal ups and downs. You may even feel depressed from time to time, but bad moods usually last less than a day.   What you need to do:  Continue to care for yourself (see self care plan)  Check your depression survival kit and update it as needed  Follow your physician s recommendations including any medication.  Do not stop taking medication unless you consult with your physician first.             YELLOW         ZONE Getting Worse    What it looks like:   Depression is starting to interfere with your life.   It may be hard to get out of bed; you may be starting to isolate yourself from others.  Symptoms of depression are starting to last most all day and this has happened for several days.   You may have suicidal thoughts but they are not constant.   What you need to do:     Call your care team. Your response to treatment will improve if you keep your care team informed of your progress. Yellow periods are signs an adjustment may need to be made.     Continue your self-care.  Just get dressed and ready for the day.  Don't give yourself time to talk yourself out of it.    Talk to someone in your support network.    Open up your Depression Self-Care Plan/Wellness Kit.             RED    ZONE Medical Alert - Get Help    What it looks like:   Depression is seriously interfering with your life.   You may experience these or other symptoms: You can t get out of bed most days, can t work or engage in other necessary activities, you have trouble taking care of basic hygiene, or basic responsibilities, thoughts of suicide or death that will not go away, self-injurious  behavior.     What you need to do:  Call your care team and request a same-day appointment. If they are not available (weekends or after hours) call your local crisis line, emergency room or 911.          Depression Self-Care Plan / Wellness Kit    Many people find that medication and therapy are helpful treatments for managing depression. In addition, making small changes to your everyday life can help to boost your mood and improve your wellbeing. Below are some tips for you to consider. Be sure to talk with your medical provider and/or behavioral health consultant if your symptoms are worsening or not improving.     Sleep   Sleep hygiene  means all of the habits that support good, restful sleep. It includes maintaining a consistent bedtime and wake time, using your bedroom only for sleeping or sex, and keeping the bedroom dark and free of distractions like a computer, smartphone, or television.     Develop a Healthy Routine  Maintain good hygiene. Get out of bed in the morning, make your bed, brush your teeth, take a shower, and get dressed. Don t spend too much time viewing media that makes you feel stressed. Find time to relax each day.    Exercise  Get some form of exercise every day. This will help reduce pain and release endorphins, the  feel good  chemicals in your brain. It can be as simple as just going for a walk or doing some gardening, anything that will get you moving.      Diet  Strive to eat healthy foods, including fruits and vegetables. Drink plenty of water. Avoid excessive sugar, caffeine, alcohol, and other mood-altering substances.     Stay Connected with Others  Stay in touch with friends and family members.    Manage Your Mood  Try deep breathing, massage therapy, biofeedback, or meditation. Take part in fun activities when you can. Try to find something to smile about each day.     Psychotherapy  Be open to working with a therapist if your provider recommends it.     Medication  Be sure to  take your medication as prescribed. Most anti-depressants need to be taken every day. It usually takes several weeks for medications to work. Not all medicines work for all people. It is important to follow-up with your provider to make sure you have a treatment plan that is working for you. Do not stop your medication abruptly without first discussing it with your provider.    Crisis Resources   These hotlines are for both adults and children. They and are open 24 hours a day, 7 days a week unless noted otherwise.    National Suicide Prevention Lifeline   988 or 9-168-082-MYWD (7641)    Crisis Text Line    www.crisistextline.org  Text HOME to 117908 from anywhere in the [place], anytime, about any type of crisis. A live, trained crisis counselor will receive the text and respond quickly.    [facility] Lifeline for LGBTQ Youth  A national crisis intervention and suicide lifeline for LGBTQ youth under 25. Provides a safe place to talk without judgement. Call 1-542.795.6771; text START to 418280 or visit www.thetrevorproject.org to talk to a trained counselor.    For [facility] crisis numbers, visit the [facility] website at:  https://[place].gov/dhs/people-we-serve/adults/health-care/mental-health/resources/crisis-contacts.jsp

## 2025-01-14 LAB
ANION GAP SERPL CALCULATED.3IONS-SCNC: 8 MMOL/L (ref 7–15)
BUN SERPL-MCNC: 14 MG/DL (ref 6–20)
CALCIUM SERPL-MCNC: 9.6 MG/DL (ref 8.8–10.4)
CHLORIDE SERPL-SCNC: 103 MMOL/L (ref 98–107)
CHOLEST SERPL-MCNC: 194 MG/DL
CREAT SERPL-MCNC: 0.87 MG/DL (ref 0.51–0.95)
EGFRCR SERPLBLD CKD-EPI 2021: 90 ML/MIN/1.73M2
FASTING STATUS PATIENT QL REPORTED: NO
FASTING STATUS PATIENT QL REPORTED: NO
GLUCOSE SERPL-MCNC: 99 MG/DL (ref 70–99)
HCO3 SERPL-SCNC: 27 MMOL/L (ref 22–29)
HDLC SERPL-MCNC: 53 MG/DL
LDLC SERPL CALC-MCNC: 110 MG/DL
NONHDLC SERPL-MCNC: 141 MG/DL
POTASSIUM SERPL-SCNC: 4.5 MMOL/L (ref 3.4–5.3)
SODIUM SERPL-SCNC: 138 MMOL/L (ref 135–145)
TRIGL SERPL-MCNC: 153 MG/DL

## 2025-01-15 LAB
GAMMA INTERFERON BACKGROUND BLD IA-ACNC: 0.02 IU/ML
M TB IFN-G BLD-IMP: NEGATIVE
M TB IFN-G CD4+ BCKGRND COR BLD-ACNC: 9.98 IU/ML
MITOGEN IGNF BCKGRD COR BLD-ACNC: 0.05 IU/ML
MITOGEN IGNF BCKGRD COR BLD-ACNC: 0.06 IU/ML
QUANTIFERON MITOGEN: 10 IU/ML
QUANTIFERON NIL TUBE: 0.02 IU/ML
QUANTIFERON TB1 TUBE: 0.07 IU/ML
QUANTIFERON TB2 TUBE: 0.08

## 2025-01-21 ENCOUNTER — MYC MEDICAL ADVICE (OUTPATIENT)
Dept: FAMILY MEDICINE | Facility: CLINIC | Age: 35
End: 2025-01-21
Payer: COMMERCIAL

## 2025-01-21 DIAGNOSIS — E66.01 MORBID OBESITY (H): Primary | ICD-10-CM

## 2025-04-17 ENCOUNTER — OFFICE VISIT (OUTPATIENT)
Dept: FAMILY MEDICINE | Facility: CLINIC | Age: 35
End: 2025-04-17
Payer: COMMERCIAL

## 2025-04-17 VITALS
WEIGHT: 283.2 LBS | DIASTOLIC BLOOD PRESSURE: 87 MMHG | SYSTOLIC BLOOD PRESSURE: 127 MMHG | OXYGEN SATURATION: 98 % | HEIGHT: 64 IN | TEMPERATURE: 98.6 F | RESPIRATION RATE: 12 BRPM | HEART RATE: 80 BPM | BODY MASS INDEX: 48.35 KG/M2

## 2025-04-17 DIAGNOSIS — G43.009 MIGRAINE WITHOUT AURA AND WITHOUT STATUS MIGRAINOSUS, NOT INTRACTABLE: Primary | ICD-10-CM

## 2025-04-17 RX ORDER — KETOROLAC TROMETHAMINE 30 MG/ML
30 INJECTION, SOLUTION INTRAMUSCULAR; INTRAVENOUS ONCE
Status: COMPLETED | OUTPATIENT
Start: 2025-04-17 | End: 2025-04-17

## 2025-04-17 RX ORDER — SUMATRIPTAN SUCCINATE 25 MG/1
25 TABLET ORAL
Qty: 18 TABLET | Refills: 1 | Status: SHIPPED | OUTPATIENT
Start: 2025-04-17

## 2025-04-17 RX ADMIN — KETOROLAC TROMETHAMINE 30 MG: 30 INJECTION, SOLUTION INTRAMUSCULAR; INTRAVENOUS at 11:41

## 2025-04-17 ASSESSMENT — PATIENT HEALTH QUESTIONNAIRE - PHQ9
SUM OF ALL RESPONSES TO PHQ QUESTIONS 1-9: 13
SUM OF ALL RESPONSES TO PHQ QUESTIONS 1-9: 13
10. IF YOU CHECKED OFF ANY PROBLEMS, HOW DIFFICULT HAVE THESE PROBLEMS MADE IT FOR YOU TO DO YOUR WORK, TAKE CARE OF THINGS AT HOME, OR GET ALONG WITH OTHER PEOPLE: SOMEWHAT DIFFICULT

## 2025-04-17 ASSESSMENT — PAIN SCALES - GENERAL: PAINLEVEL_OUTOF10: MODERATE PAIN (6)

## 2025-04-17 NOTE — PROGRESS NOTES
Assessment & Plan     Migraine without aura and without status migrainosus, not intractable  Migraine vs Tension Headache. Reviewed ER notes and results. Head CT was negative, though it did not capture sinuses very well. Overall neurological exam is reassuring today, no concerning findings. Bee/Magda's negative. I do believe an underlying migraine or tenison headache is causing her symptoms. Will do IM toradol in clinic today. Start imitrex for as needed. Recommend tension relief to her neck, she is quite tender and tight in this area. She has good neck ROM. Recommend massage and neck stretching. Could consider muscle relaxer as well. If no improvement in headache in a week, follow up for possible CT of orbital and sinus.  - SUMAtriptan (IMITREX) 25 MG tablet; Take 1 tablet (25 mg) by mouth at onset of headache for migraine. May repeat in 2 hours. Max 8 tablets/24 hours.  - ketorolac (TORADOL) injection 30 mg          Ivania Garica is a 34 year old, presenting for the following health issues:  RECHECK (F/u from urgent care visit. [facility] in [place] ruled out: TIA/Stroke & Infection. CT scan normal. They suspected I may be having a migraine. I have hx of migraines but haven't had one like this before. I still have persistent head pressure + pressure behind my face but no congestion, slow thinking + very tired/fatigued (sleeping all day).//)      4/17/2025    10:51 AM   Additional Questions   Roomed by liberty MULLER      ER follow up on 4/5/25 for headache and dizziness  She had a near syncopal event, she did not lose consciousness.   She had an episode at work where her legs felt weak while walking   She has been having headaches since the beginning of April     Head Pressure  -base of skull, throbbing headahe  -worse when she bends down  -also has pressure inner eyes, behind eyes, doesn't feel like its pushing on her eyes, but behind her eyes   -Both spots occur at the same time  -Denies  "fevers  -Denies medication changes, only change was that she missed her Strattera during spring break which was the end of March/early April   - She denies chest palpitations  - She continues to have dizziness  -She has been drinking lots of water (has Americo cup)   - She hasn't taken any advil or tylenol since the ER.   -Head CT was normal  -EKG was normal  -Blood work unrevealing          Review of Systems  Detailed as above      Objective    /87 (BP Location: Right arm, Patient Position: Sitting, Cuff Size: Adult Large)   Pulse 80   Temp 98.6  F (37  C) (Temporal)   Resp 12   Ht 1.619 m (5' 3.74\")   Wt 128.5 kg (283 lb 3.2 oz)   LMP 03/17/2025   SpO2 98%   BMI 49.01 kg/m    Body mass index is 49.01 kg/m .  Physical Exam  Constitutional:       Appearance: Normal appearance.   Eyes:      Extraocular Movements: Extraocular movements intact.      Conjunctiva/sclera: Conjunctivae normal.      Pupils: Pupils are equal, round, and reactive to light.      Comments: Pain with EOMs to the lateral sides   Neck:      Comments: Kernig and Brudzinski's negative  Cardiovascular:      Rate and Rhythm: Normal rate and regular rhythm.      Pulses: Normal pulses.   Musculoskeletal:      Cervical back: Normal range of motion and neck supple. Tenderness present. No rigidity.   Neurological:      Mental Status: She is alert and oriented to person, place, and time. Mental status is at baseline.      GCS: GCS eye subscore is 4. GCS verbal subscore is 5. GCS motor subscore is 6.      Cranial Nerves: Cranial nerves 2-12 are intact.      Motor: No weakness, tremor or atrophy.      Coordination: Coordination is intact.      Gait: Gait is intact.                    Signed Electronically by: LORENE Cleary CNP    "

## 2025-04-17 NOTE — PATIENT INSTRUCTIONS
We did a shot of toradol in clinic today, avoid any ibuprofen or advil the rest of the day. You can take as normal tomorrow.     Try taking Sumatriptan for headache, repeat dose in 2 hours if your headache is still there.     Start doing your next exercises and massage techniques to relieve tension.     Send me a [facility] message tomorrow to let me know how you are feeling    If you still have headaches a week from now, send me a message and we'll get you in for a CT scan.     Wear the heart monitor if your lightheadeness/dizziness does not get better.

## 2025-06-02 DIAGNOSIS — L21.9 SEBORRHEIC DERMATITIS OF SCALP: ICD-10-CM

## 2025-06-02 RX ORDER — FLUOCINONIDE TOPICAL SOLUTION USP, 0.05% 0.5 MG/ML
SOLUTION TOPICAL
Qty: 60 ML | Refills: 0 | Status: SHIPPED | OUTPATIENT
Start: 2025-06-02

## 2025-06-18 ENCOUNTER — PATIENT OUTREACH (OUTPATIENT)
Dept: CARE COORDINATION | Facility: CLINIC | Age: 35
End: 2025-06-18
Payer: COMMERCIAL

## 2025-08-12 DIAGNOSIS — L21.9 SEBORRHEIC DERMATITIS OF SCALP: ICD-10-CM

## 2025-08-12 RX ORDER — FLUOCINONIDE TOPICAL SOLUTION USP, 0.05% 0.5 MG/ML
SOLUTION TOPICAL
Qty: 60 ML | Refills: 0 | Status: SHIPPED | OUTPATIENT
Start: 2025-08-12